# Patient Record
Sex: FEMALE | Race: WHITE | Employment: FULL TIME
[De-identification: names, ages, dates, MRNs, and addresses within clinical notes are randomized per-mention and may not be internally consistent; named-entity substitution may affect disease eponyms.]

---

## 2017-10-08 ENCOUNTER — HEALTH MAINTENANCE LETTER (OUTPATIENT)
Age: 27
End: 2017-10-08

## 2024-02-28 ENCOUNTER — TRANSFERRED RECORDS (OUTPATIENT)
Dept: HEALTH INFORMATION MANAGEMENT | Facility: CLINIC | Age: 34
End: 2024-02-28

## 2024-07-10 SDOH — HEALTH STABILITY: PHYSICAL HEALTH: ON AVERAGE, HOW MANY DAYS PER WEEK DO YOU ENGAGE IN MODERATE TO STRENUOUS EXERCISE (LIKE A BRISK WALK)?: 3 DAYS

## 2024-07-10 SDOH — HEALTH STABILITY: PHYSICAL HEALTH: ON AVERAGE, HOW MANY MINUTES DO YOU ENGAGE IN EXERCISE AT THIS LEVEL?: 30 MIN

## 2024-07-10 ASSESSMENT — SOCIAL DETERMINANTS OF HEALTH (SDOH): HOW OFTEN DO YOU GET TOGETHER WITH FRIENDS OR RELATIVES?: MORE THAN THREE TIMES A WEEK

## 2024-07-15 ENCOUNTER — OFFICE VISIT (OUTPATIENT)
Dept: FAMILY MEDICINE | Facility: CLINIC | Age: 34
End: 2024-07-15
Payer: COMMERCIAL

## 2024-07-15 VITALS
SYSTOLIC BLOOD PRESSURE: 126 MMHG | WEIGHT: 279 LBS | BODY MASS INDEX: 47.63 KG/M2 | OXYGEN SATURATION: 100 % | RESPIRATION RATE: 16 BRPM | HEIGHT: 64 IN | DIASTOLIC BLOOD PRESSURE: 82 MMHG | HEART RATE: 100 BPM | TEMPERATURE: 99 F

## 2024-07-15 DIAGNOSIS — Z33.1 PREGNANCY, INCIDENTAL: ICD-10-CM

## 2024-07-15 DIAGNOSIS — F32.9 MAJOR DEPRESSIVE DISORDER WITH CURRENT ACTIVE EPISODE, UNSPECIFIED DEPRESSION EPISODE SEVERITY, UNSPECIFIED WHETHER RECURRENT: ICD-10-CM

## 2024-07-15 DIAGNOSIS — L93.2 CUTANEOUS LUPUS ERYTHEMATOSUS: ICD-10-CM

## 2024-07-15 DIAGNOSIS — E03.8 SUBCLINICAL HYPOTHYROIDISM: ICD-10-CM

## 2024-07-15 DIAGNOSIS — Z3A.01 6 WEEKS GESTATION OF PREGNANCY: ICD-10-CM

## 2024-07-15 DIAGNOSIS — Z76.89 ENCOUNTER TO ESTABLISH CARE: Primary | ICD-10-CM

## 2024-07-15 LAB
ABO/RH(D): NORMAL
ERYTHROCYTE [DISTWIDTH] IN BLOOD BY AUTOMATED COUNT: 13 % (ref 10–15)
HBA1C MFR BLD: 5.3 % (ref 0–5.6)
HCT VFR BLD AUTO: 37.7 % (ref 35–47)
HGB BLD-MCNC: 12.7 G/DL (ref 11.7–15.7)
MCH RBC QN AUTO: 31 PG (ref 26.5–33)
MCHC RBC AUTO-ENTMCNC: 33.7 G/DL (ref 31.5–36.5)
MCV RBC AUTO: 92 FL (ref 78–100)
PLATELET # BLD AUTO: 255 10E3/UL (ref 150–450)
RBC # BLD AUTO: 4.1 10E6/UL (ref 3.8–5.2)
SPECIMEN EXPIRATION DATE: NORMAL
WBC # BLD AUTO: 6.8 10E3/UL (ref 4–11)

## 2024-07-15 PROCEDURE — 84439 ASSAY OF FREE THYROXINE: CPT | Performed by: PHYSICIAN ASSISTANT

## 2024-07-15 PROCEDURE — 86780 TREPONEMA PALLIDUM: CPT | Performed by: PHYSICIAN ASSISTANT

## 2024-07-15 PROCEDURE — 83036 HEMOGLOBIN GLYCOSYLATED A1C: CPT | Performed by: PHYSICIAN ASSISTANT

## 2024-07-15 PROCEDURE — 86762 RUBELLA ANTIBODY: CPT | Performed by: PHYSICIAN ASSISTANT

## 2024-07-15 PROCEDURE — 86706 HEP B SURFACE ANTIBODY: CPT | Performed by: PHYSICIAN ASSISTANT

## 2024-07-15 PROCEDURE — 36415 COLL VENOUS BLD VENIPUNCTURE: CPT | Performed by: PHYSICIAN ASSISTANT

## 2024-07-15 PROCEDURE — 87389 HIV-1 AG W/HIV-1&-2 AB AG IA: CPT | Performed by: PHYSICIAN ASSISTANT

## 2024-07-15 PROCEDURE — 80053 COMPREHEN METABOLIC PANEL: CPT | Performed by: PHYSICIAN ASSISTANT

## 2024-07-15 PROCEDURE — 86901 BLOOD TYPING SEROLOGIC RH(D): CPT | Performed by: PHYSICIAN ASSISTANT

## 2024-07-15 PROCEDURE — 99204 OFFICE O/P NEW MOD 45 MIN: CPT | Performed by: PHYSICIAN ASSISTANT

## 2024-07-15 PROCEDURE — 86900 BLOOD TYPING SEROLOGIC ABO: CPT | Performed by: PHYSICIAN ASSISTANT

## 2024-07-15 PROCEDURE — 85027 COMPLETE CBC AUTOMATED: CPT | Performed by: PHYSICIAN ASSISTANT

## 2024-07-15 PROCEDURE — 84443 ASSAY THYROID STIM HORMONE: CPT | Performed by: PHYSICIAN ASSISTANT

## 2024-07-15 PROCEDURE — 87086 URINE CULTURE/COLONY COUNT: CPT | Performed by: PHYSICIAN ASSISTANT

## 2024-07-15 RX ORDER — BUPROPION HYDROCHLORIDE 300 MG/1
300 TABLET ORAL EVERY MORNING
Qty: 90 TABLET | Refills: 3 | Status: SHIPPED | OUTPATIENT
Start: 2024-07-15 | End: 2024-10-07

## 2024-07-15 RX ORDER — LEVOTHYROXINE SODIUM 25 UG/1
25 TABLET ORAL DAILY
COMMUNITY
End: 2024-07-16

## 2024-07-15 RX ORDER — ALBUTEROL SULFATE 90 UG/1
2 AEROSOL, METERED RESPIRATORY (INHALATION) EVERY 4 HOURS PRN
COMMUNITY

## 2024-07-15 RX ORDER — BUPROPION HYDROCHLORIDE 300 MG/1
300 TABLET ORAL EVERY MORNING
COMMUNITY
Start: 2016-07-10 | End: 2024-07-15

## 2024-07-15 ASSESSMENT — PAIN SCALES - GENERAL: PAINLEVEL: NO PAIN (1)

## 2024-07-15 NOTE — PATIENT INSTRUCTIONS
Patient Education   Preventive Care Advice   This is general advice given by our system to help you stay healthy. However, your care team may have specific advice just for you. Please talk to your care team about your preventive care needs.  Nutrition  Eat 5 or more servings of fruits and vegetables each day.  Try wheat bread, brown rice and whole grain pasta (instead of white bread, rice, and pasta).  Get enough calcium and vitamin D. Check the label on foods and aim for 100% of the RDA (recommended daily allowance).  Lifestyle  Exercise at least 150 minutes each week  (30 minutes a day, 5 days a week).  Do muscle strengthening activities 2 days a week. These help control your weight and prevent disease.  No smoking.  Wear sunscreen to prevent skin cancer.  Have a dental exam and cleaning every 6 months.  Yearly exams  See your health care team every year to talk about:  Any changes in your health.  Any medicines your care team has prescribed.  Preventive care, family planning, and ways to prevent chronic diseases.  Shots (vaccines)   HPV shots (up to age 26), if you've never had them before.  Hepatitis B shots (up to age 59), if you've never had them before.  COVID-19 shot: Get this shot when it's due.  Flu shot: Get a flu shot every year.  Tetanus shot: Get a tetanus shot every 10 years.  Pneumococcal, hepatitis A, and RSV shots: Ask your care team if you need these based on your risk.  Shingles shot (for age 50 and up)  General health tests  Diabetes screening:  Starting at age 35, Get screened for diabetes at least every 3 years.  If you are younger than age 35, ask your care team if you should be screened for diabetes.  Cholesterol test: At age 39, start having a cholesterol test every 5 years, or more often if advised.  Bone density scan (DEXA): At age 50, ask your care team if you should have this scan for osteoporosis (brittle bones).  Hepatitis C: Get tested at least once in your life.  STIs (sexually  transmitted infections)  Before age 24: Ask your care team if you should be screened for STIs.  After age 24: Get screened for STIs if you're at risk. You are at risk for STIs (including HIV) if:  You are sexually active with more than one person.  You don't use condoms every time.  You or a partner was diagnosed with a sexually transmitted infection.  If you are at risk for HIV, ask about PrEP medicine to prevent HIV.  Get tested for HIV at least once in your life, whether you are at risk for HIV or not.  Cancer screening tests  Cervical cancer screening: If you have a cervix, begin getting regular cervical cancer screening tests starting at age 21.  Breast cancer scan (mammogram): If you've ever had breasts, begin having regular mammograms starting at age 40. This is a scan to check for breast cancer.  Colon cancer screening: It is important to start screening for colon cancer at age 45.  Have a colonoscopy test every 10 years (or more often if you're at risk) Or, ask your provider about stool tests like a FIT test every year or Cologuard test every 3 years.  To learn more about your testing options, visit:   .  For help making a decision, visit:   https://bit.ly/nr03273.  Prostate cancer screening test: If you have a prostate, ask your care team if a prostate cancer screening test (PSA) at age 55 is right for you.  Lung cancer screening: If you are a current or former smoker ages 50 to 80, ask your care team if ongoing lung cancer screenings are right for you.  For informational purposes only. Not to replace the advice of your health care provider. Copyright   2023 [employer]. All rights reserved. Clinically reviewed by the [facility] Transitions Program. [employer] [id number] - REV 01/24.

## 2024-07-15 NOTE — PROGRESS NOTES
"Preventive Care Visit  [facility]  NATASHA FRANCO PA-C, Physician Assistant - Medical  Jul 15, 2024      Assessment & Plan     Encounter to establish care  Records briefly reviewed.     Subclinical hypothyroidism  Will check TSH/T4. If needed, will restart levothyroxine. If not, will defer to OB provider to monitor in pregnancy (which often leads to a hyperthyroid picture).    Cutaneous lupus erythematosus  Not taking any medications.     Major depressive disorder with current active episode, unspecified depression episode severity, unspecified whether recurrent  Benefits outweigh risks for this patient. Has tried multiple medications. Therefore, I have refilled this for her.   - buPROPion (WELLBUTRIN XL) 300 MG 24 hr tablet; Take 1 tablet (300 mg) by mouth every morning    Pregnancy, incidental      6 weeks gestation of pregnancy  Jonah initial pregnancy labs (pt stated she is Rh negative) since she is here and we are drawing her blood for other reasons. Too soon for doptone. Will defer to OB provider to follow up as needed.     - CBC with platelets  - Rubella Antibody IgG  - Urine Culture Aerobic Bacterial  - HIV Antigen Antibody Combo  - Hepatitis B Surface Antibody  - Treponema Abs w Reflex to RPR and Titer  - Comprehensive metabolic panel (BMP + Alb, Alk Phos, ALT, AST, Total. Bili, TP)  - Hemoglobin A1c  - ABO and Rh    BMI  Estimated body mass index is 47.89 kg/m  as calculated from the following:    Height as of this encounter: 1.626 m (5' 4\").    Weight as of this encounter: 126.6 kg (279 lb).       Subjective   Sourav is a 33 year old, presenting for the following:  Establish care.        7/15/2024     2:53 PM   Additional Questions   Roomed by Priti   Accompanied by self         7/15/2024     2:53 PM   Patient Reported Additional Medications   Patient reports taking the following new medications n/a        Sourav is a albino 33 year old female who presents to clinic to establish " a primary care relationship. She is originally from [place] and went to [place] for college. 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 She brought her  back home with her because she wanted to raise her family here. She is roughly 6 weeks pregnant (positive home test 2 weeks ago), LMP was 6/4/24, first pregnancy. No spotting. Last PAP was July 2019 so she is due. However, she has an appt with a Midwife on August 19th and will be doing her PAP, vaginal cultures at that initial OB visit.     She has a hx of needing to take levothyroxine for subclinical hypothyroidism but has not taken it for 3 months because there was an issue with insurance and her move. She was on 25mcg/50mcg every other day regimen. She would like her thyroid checked.     She takes bupropion for depression (since age 12), is doing very well on it and does not wish to discontinue the medication. She states SSRIs are not effective. She has tried multiple medications and this is this one that works. She says her  can tell if she misses even one dose.    She has had chicken pox.              7/10/2024   General Health   How would you rate your overall physical health? Good   Feel stress (tense, anxious, or unable to sleep) To some extent      (!) STRESS CONCERN      7/10/2024   Nutrition   Three or more servings of calcium each day? (!) NO   Diet: Vegetarian/vegan   How many servings of fruit and vegetables per day? (!) 2-3   How many sweetened beverages each day? 0-1            7/10/2024   Exercise   Days per week of moderate/strenous exercise 3 days   Average minutes spent exercising at this level 30 min            7/10/2024   Social Factors   Frequency of gathering with friends or relatives More than three times a week   Worry food won't last until get money to buy more No   Food not last or not have enough money for food? No   Do you have housing? (Housing is defined as stable permanent housing  and does not include staying ouside in a car, in a tent, in an abandoned building, in an overnight shelter, or couch-surfing.) Yes   Are you worried about losing your housing? No   Lack of transportation? No   Unable to get utilities (heat,electricity)? No            7/10/2024   Dental   Dentist two times every year? Yes            7/10/2024   TB Screening   Were you born outside of the [place]? Yes            Today's PHQ-2 Score:       7/15/2024     2:53 PM   PHQ-2 ( 1999 [employer])   Q1: Little interest or pleasure in doing things 1   Q2: Feeling down, depressed or hopeless 1   PHQ-2 Score 2   Q1: Little interest or pleasure in doing things Several days   Q2: Feeling down, depressed or hopeless Several days   PHQ-2 Score 2           7/10/2024   Substance Use   Alcohol more than 3/day or more than 7/wk No   Do you use any other substances recreationally? No        Social History     Tobacco Use     Smoking status: Never     Smokeless tobacco: Never   Vaping Use     Vaping status: Never Used   Substance Use Topics     Alcohol use: No     Drug use: No             7/10/2024   One time HIV Screening   Previous HIV test? No          7/10/2024   STI Screening   New sexual partner(s) since last STI/HIV test? No        History of abnormal Pap smear: No - age 30-64 HPV with reflex Pap every 5 years recommended          7/10/2024   Contraception/Family Planning   Questions about contraception or family planning No         Reviewed and updated as needed this visit by Provider       Past Medical History:   Diagnosis Date     Depressive disorder 2002     Thyroid disease 2022     Past Surgical History:   Procedure Laterality Date     HC REMOVAL OF TONSILS,<11 Y/O      Description: Tonsillectomy;  Recorded: 01/13/2012;     OB History   No obstetric history on file.     BP Readings from Last 3 Encounters:   07/15/24 126/82   10/13/15 128/79   09/22/09 109/61    Wt Readings from Last 3 Encounters:   07/15/24 126.6 kg (279 lb)   10/13/15  "89.8 kg (198 lb)   09/22/09 97.1 kg (214 lb) (98%, Z= 2.13)*     * Growth percentiles are based on CDC (Girls, 2-20 Years) data.                  Patient Active Problem List   Diagnosis     CARDIOVASCULAR SCREENING; LDL GOAL LESS THAN 160     Past Surgical History:   Procedure Laterality Date     HC REMOVAL OF TONSILS,<13 Y/O      Description: Tonsillectomy;  Recorded: 01/13/2012;       Social History     Tobacco Use     Smoking status: Never     Smokeless tobacco: Never   Substance Use Topics     Alcohol use: No     Family History   Problem Relation Age of Onset     Breast Cancer Mother      Diabetes Father      Obesity Father      Breast Cancer Maternal Grandmother      Heart Disease Paternal Grandmother         chf     Depression Brother      Obesity Sister      Cancer Other         cervival     Breast Cancer Other          Current Outpatient Medications   Medication Sig Dispense Refill     albuterol (PROAIR HFA/PROVENTIL HFA/VENTOLIN HFA) 108 (90 Base) MCG/ACT inhaler Inhale 2 puffs into the lungs every 4 hours as needed for cough       buPROPion (WELLBUTRIN XL) 300 MG 24 hr tablet Take 1 tablet (300 mg) by mouth every morning 90 tablet 3     levothyroxine (SYNTHROID) 25 MCG tablet Take 1 tablet (25 mcg) by mouth daily 90 tablet 3     Prenatal Vit-Fe Fumarate-FA (PRENATAL MULTIVITAMIN  PLUS IRON) 27-1 MG TABS Take 1 tablet by mouth daily       Allergies   Allergen Reactions     Nkda [No Known Drug Allergy]      Recent Labs   Lab Test 07/15/24  1603   A1C 5.3   ALT 64*   CR 0.66   GFRESTIMATED >90   POTASSIUM 4.1   TSH 7.36*          Review of Systems  Constitutional, HEENT, cardiovascular, pulmonary, gi and gu systems are negative, except as otherwise noted.       Objective    Exam  /82   Pulse 100   Temp 99  F (37.2  C) (Tympanic)   Resp 16   Ht 1.626 m (5' 4\")   Wt 126.6 kg (279 lb)   SpO2 100%   BMI 47.89 kg/m     Estimated body mass index is 47.89 kg/m  as calculated from the following:    " "Height as of this encounter: 1.626 m (5' 4\").    Weight as of this encounter: 126.6 kg (279 lb).    Physical Exam  Vitals reviewed.   Constitutional:       Appearance: Normal appearance. She is obese. She is not ill-appearing.   Neck:      Thyroid: No thyroid mass, thyromegaly or thyroid tenderness.   Cardiovascular:      Rate and Rhythm: Normal rate and regular rhythm.      Heart sounds: Normal heart sounds. No murmur heard.  Pulmonary:      Effort: Pulmonary effort is normal.      Breath sounds: Normal breath sounds.   Musculoskeletal:      Cervical back: Neck supple.   Lymphadenopathy:      Cervical: No cervical adenopathy.      Upper Body:      Right upper body: No supraclavicular adenopathy.      Left upper body: No supraclavicular adenopathy.   Neurological:      General: No focal deficit present.      Mental Status: She is alert and oriented to person, place, and time.   Psychiatric:         Mood and Affect: Mood normal.         Speech: Speech normal.         Behavior: Behavior normal. Behavior is cooperative.         Thought Content: Thought content normal.         Cognition and Memory: Cognition normal.         Judgment: Judgment normal.       Signed Electronically by: NATASHA FRANCO PA-C    "

## 2024-07-16 DIAGNOSIS — R79.89 LFT ELEVATION: Primary | ICD-10-CM

## 2024-07-16 LAB
ALBUMIN SERPL BCG-MCNC: 4.3 G/DL (ref 3.5–5.2)
ALP SERPL-CCNC: 79 U/L (ref 40–150)
ALT SERPL W P-5'-P-CCNC: 64 U/L (ref 0–50)
ANION GAP SERPL CALCULATED.3IONS-SCNC: 11 MMOL/L (ref 7–15)
AST SERPL W P-5'-P-CCNC: 37 U/L (ref 0–45)
BILIRUB SERPL-MCNC: 0.3 MG/DL
BUN SERPL-MCNC: 9.5 MG/DL (ref 6–20)
CALCIUM SERPL-MCNC: 9.1 MG/DL (ref 8.6–10)
CHLORIDE SERPL-SCNC: 102 MMOL/L (ref 98–107)
CREAT SERPL-MCNC: 0.66 MG/DL (ref 0.51–0.95)
EGFRCR SERPLBLD CKD-EPI 2021: >90 ML/MIN/1.73M2
GLUCOSE SERPL-MCNC: 85 MG/DL (ref 70–99)
HBV SURFACE AB SERPL IA-ACNC: 26.3 M[IU]/ML
HBV SURFACE AB SERPL IA-ACNC: REACTIVE M[IU]/ML
HCO3 SERPL-SCNC: 23 MMOL/L (ref 22–29)
HIV 1+2 AB+HIV1 P24 AG SERPL QL IA: NONREACTIVE
POTASSIUM SERPL-SCNC: 4.1 MMOL/L (ref 3.4–5.3)
PROT SERPL-MCNC: 7 G/DL (ref 6.4–8.3)
RUBV IGG SERPL QL IA: 8.02 INDEX
RUBV IGG SERPL QL IA: POSITIVE
SODIUM SERPL-SCNC: 136 MMOL/L (ref 135–145)
T PALLIDUM AB SER QL: NONREACTIVE
T4 FREE SERPL-MCNC: 1.07 NG/DL (ref 0.9–1.7)
TSH SERPL DL<=0.005 MIU/L-ACNC: 7.36 UIU/ML (ref 0.3–4.2)

## 2024-07-16 RX ORDER — LEVOTHYROXINE SODIUM 25 UG/1
25 TABLET ORAL DAILY
Qty: 90 TABLET | Refills: 3 | Status: SHIPPED | OUTPATIENT
Start: 2024-07-16 | End: 2024-10-07

## 2024-07-17 PROBLEM — Z3A.01 6 WEEKS GESTATION OF PREGNANCY: Status: ACTIVE | Noted: 2024-07-17

## 2024-07-17 PROBLEM — F32.9 MAJOR DEPRESSIVE DISORDER WITH CURRENT ACTIVE EPISODE, UNSPECIFIED DEPRESSION EPISODE SEVERITY, UNSPECIFIED WHETHER RECURRENT: Status: ACTIVE | Noted: 2024-07-17

## 2024-07-17 PROBLEM — L93.2 CUTANEOUS LUPUS ERYTHEMATOSUS: Status: ACTIVE | Noted: 2024-07-17

## 2024-07-17 LAB — BACTERIA UR CULT: NORMAL

## 2024-07-19 ENCOUNTER — HOSPITAL ENCOUNTER (OUTPATIENT)
Dept: ULTRASOUND IMAGING | Facility: CLINIC | Age: 34
Discharge: HOME | End: 2024-07-19
Attending: PHYSICIAN ASSISTANT | Admitting: PHYSICIAN ASSISTANT
Payer: COMMERCIAL

## 2024-07-19 DIAGNOSIS — R79.89 LFT ELEVATION: ICD-10-CM

## 2024-07-19 PROCEDURE — 76705 ECHO EXAM OF ABDOMEN: CPT | Mod: 26 | Performed by: RADIOLOGY

## 2024-07-19 PROCEDURE — 76705 ECHO EXAM OF ABDOMEN: CPT

## 2024-08-17 NOTE — PATIENT INSTRUCTIONS
"\"We hope you had a positive experience and that you can definitely recommend [facility] to your family and friends. You ll be receiving a survey soon and we look forward to hearing your feedback\".    Welcome to [facility]   and thank you for choosing us for your maternity care provider!  Congratulations!      BeMyGuesthart  After each of your visits you are welcome to check Globevestor for your visit summary including education and links to information relevant to your pregnancy and/or well woman care.   Find the \"Visits\" tab at the top of the page, you will see a list of recent visits and for each visit a for link for \"View After Visit Summary.\" View of your After Visit Summary will allow you to read our recommendations from your visit, review any education provided, and link to websites with useful information.   If you have any questions or difficulty navigating [employer], please feel free to contact us and we will do our best to direct you.  Meet the Midwives from [facility]  You are invited to an informational meet and greet with [facility]'s [facility] Certified Nurse-Midwives. Our free \"Meet the Midwives\" event is a great opportunity to learn about our midwives' philosophy and experience, the hospitals where we can assist with your birth, and answer questions you may have. Partners, friends, and family are welcome to attend. Currently, this is a virtual event.  Date  Last Thursday of every month at 7 pm.    Link to next (live) meeting   [email or web address]  To Join by Telephone (audio only) Call:   [phone] Phone Conference ID: [phone] #    Contact information:  Appointment line and to get a hold of CNM in clinic Monday-Friday 8 am - 5 pm:  [phone].  There are some clinics with early start times (1st appointment 7:40 am) and others with evening hours (last appointment 6:20 pm).  Most are typically open from 8 " am to 5 pm.    CNM on call answering service: [phone].  Specify your hospital of choice and leave a brief message for CNM;  will then page CNM who is on call at your specified hospital and you should receive a call back with 15 minutes.  Be sure that your ringer is audible and that you can accept blocked calls so that we can get back in touch with you! This number should be reserved for urgent needs if during the day, before 8 am, after 5 pm, weekends, holidays.      We support all families in their infant feeding journey. We'll provide education on Breastfeeding/Chestfeeding early and often to help you achieve your goals. Please let us know if you have questions along the way!      Breastfeeding: a Healthy Option for You and Your Baby  Consider breastfeeding for the healthiest way to feed your baby. Ask your midwife or physician for more information.     The choice of how you will feed your baby is important.  Before your baby s birth, you ll want to learn about the benefits of breastfeeding.  [facility]) continue to support Baby Friendly standards; an initiative that was created by the World Health Organization and UNICEF.  This helps give you and your baby the best start in feeding their baby.    Why should I breastfeed my baby?   Babies are less likely to develop childhood obesity or diabetes   Babies are less likely to suffer from recurrent ear infections   Babies are less likely to be hospitalized for respiratory conditions   Breast milk is rich in nutrients and antibodies-it is easy to digest    How does it benefit me?   Lowers the risk for diabetes, breast and ovarian cancer and postpartum depression   Moms can lose  baby weight  more quickly   Cost savings - formula can cost well over $1,500 per year   Convenient - no bottles and nipples to sterilize, no measuring and mixing formula   The physical contact with  breastfeeding can make babies feel secure, warm and comforted     What about formula?  While you and your baby are staying with us at [facility], we will support whatever feeding choice you make for your baby.    Some important considerations:    The American Academy of Pediatrics, the World Health Organization, and many more organizations recommend exclusive breastfeeding for 6 months and continued breastfeeding while adding other foods for the first 1-2 years.    Any amount of breastmilk has benefits to both baby and mother.  Giving formula in replacement of breastfeeding can affect mother s milk supply.  If formula is needed, hospital staff will work with you on a plan to help develop your milk supply.  Formula alters the natural growth of good bacteria in the  stomach.   Research has found that first time mothers who offer formula in the hospital have a shorter duration of breastfeeding.    How can I start to prepare?   Start by having a conversation with your medical provider.   Talk with your partner, family and friends.   Attend a prenatal class that includes breastfeeding preparation. Birth and breastfeeding classes are offered by beModel. Visit Netvibes for class information.   After your baby s birth, hospital staff and lactation consultants will help you and your baby get off to a great start with breastfeeding.      RESOURCES  [facility] Maternity Care:   https://[facility].org/locations/the-birth[facility] Maternity Care:   https://[facility].org/locations/the-[facility]place-at[facility]        Breastfeeding:    OUTPATIENT LACTATION RESOURCES     -Schedule an appointment with a [facility] Nurse Midwives [facility] RACHEL who is also a Lactation Consultant by calling [phone]. We see women for breastfeeding visits at [facility].      Chocolate Milk Club:  http://www.StreamSpecvesselsmidwiferysThird Brigadeices.com/chocolate-milk-club/  Join the Facebook group or join us for support on the first Monday of each month from 7 to 9 p.m.  , Dr. Michael Guardado, DNP, CNM, CNP, IBCLC, ICEA  Phone: [phone]  Fax: [phone]  Email: [email or web address]    R.O.S.E. = Reaching our Sisters Everywhere  Http://www.breastfeedingrose.org/    Black Women Do Breastfeed Blog  www.blackwomendobreastfeed.org    Club Mom breastfeeding support for Black mothers:  Contact Diana Mak  Phone: [phone]   Email:  [email or web address]     Kay Suarez  Phone: [phone]   Email:  [email or web address].    Club Dad parent support for Black fathers:   Contact Hamzah López   Phone: [phone]   Email:  [email or web address].    The ong Breastfeeding Coalition is a wonderful support for [facility] women who are breastfeeding.  They are best found on Facebook.      The Hmong Breastfeeding Coalition has produced a collection of video stories about breastfeeding in the ong community, produced by the Hmong Breastfeeding Coalition.  Most are in English, but one on handling breastmilk is in Hmong.  The video collection is in the middle of the page.    This page also has several other resources on ong breastfeeding.     https://mnbreastfeedingcoalition.org/communities/    WIC program application: Paddy Tavo   Https://www.youWolongeube.com/watch?v=lQoWwPoyGYc    For information on Local WIC services call:  [phone]    You may qualify...  If you are pregnant, nursing, or have a child under age 5, we encourage you to Apply for WIC.    WIC Provides...  Nutrition tips and advice  Support for breastfeeding  Healthy foods like fresh fruits and vegetables, whole grain cereals, bread and tortillas, low fat milk, and baby foods  Caring and supportive staff  Don't delay! We're here to help!  CALL TODAY FOR A WIC  CLINIC NEAR YOU  0-535-ODM-0358 or 1-815.348.1482     New Parent Connection:   Elidia Hernadez, [address] [facility]  In-person meetings on  from 6 pm - 7:15 pm for parents of  to 9 months, at the same site.   All are free, drop-in, no registration required.    There are also free virtual meetings ongoing on :  11:30 am - 12:30 pm for parents of newborns to 3 months  4:15 pm to 5:15 pm for parents of 3 to 9-month olds  For joining info parents should call Nereida Calderon at [phone]    -Baby Café  Find a Baby Café - Baby Café Zappli (babyCreation Technologies.Real Gravity)   Search by State ([place]) to find the nearest cafe to you      [facility] Baby Café  Due to COVID-19, all Baby Café sessions are canceled until further notice. For lactation support, please contact one of our bilingual staff:  Jia (IBCLC) [phone]  Brittany (IBCLC/ Hebrew) [phone]  Zobrayden (Hmong) [phone]  Maynor (Prydeinig) [phone]  Baby Café is a free, drop-in service offering breastfeeding/chestfeeding support. Come share tips and socialize with other pregnant, breastfeeding/chestfeeding families. Babies and siblings are welcome (no  available).  We offer:  Professionally trained lactation staff.  Resource books for lending.  Relaxed and fun atmosphere.  Refreshments.   More information  Brittany Kidd  [phone]  [email or web address].     -Attend a baby weigh in at [facility].  Lactation consultants are available to answer questions  [place]:  1:00 - 2:00  [facility]:  1:00 - 2:00   www.Molecular PartnersWest Los Angeles Memorial HospitalGenable Technologies Ltd..HelloSign    -Attend one of the New Mama groups at [facility] in [facility].  [facility] also offers one-on-one in home and in office lactation consults.   www.Red[employer] Power [facility].HelloSign    -Attend a LeLeche League meeting.  Multiple groups in several locations throughout the [facility]. The meetings are no-cost and always informative breastfeeding education session  through Internnathaly La Leche Lorna  Www.ada.org/     Held at [facility] the second Thursday of each month at 7pm    Childbirth and Parenting Education:     Everyday Miracles:   https://www.everyday-miracles.org/    Free Video Series from [facility]: https://nursing.[facility]/academics/specialty-areas/nurse-midwifery/having-baby-prenatal-videos/having-baby-prenatal-and    Childbirth Education virtual (live) classes: www.Beamr/classes  The Birth Hour: https://Fixmo/online-childbirth-class/  BirthED: https://www.birthedmn.com/  JOSSY parenting center: http://Capture Educational Consulting ServicesIra Davenport Memorial HospitalOptixConnect/   (600) 835-DWQI  Blooma: (education, yoga & wellness) www.Nitro PDF  Enlightened Mama: www.enlightenedmamaOrbeus   Childbirth collective: (Parent topic nights)  www.childbirthcollective.org/  Hypnobabies:  www.hypnobabiestFinicity.Off Track Planet/  Hypnobirthing:  Http://Agrar33.Off Track Planet/  Hypnobirthing virtual class: www.Shopography/hypnobirthing    Information about doulas:  Childbirth collective: http://www.childbirthcollective.org/  Doulas of [place] (DONNIE):  www.donnie.org  [facility]  project: http://twincitiesdoulaproject.com/     Early Childhood and Family Education (ECFE):  ECFE offers parents hands-on learning experiences that will nourish a lifetime of teachable moments.  http://ecfe.info/ecfe-home/    APPS and Podcasts:   Gricelda Rubin Nurture    Evidence Based Birth  The Birth Hour (for birth stories)   Birthful   Expectful   The Longest Shortest Time  PregnancyPodcast Jessica Rojas  https://www.downtobirthshow.com/    Book Recommendations:   Caroline Oslo's Birthing From Within--first few chapters include a new-age tone, you may prefer to skip it and keep going, because there is good stuff later.  This book recommendation covers emotional preparation, but does cover coping with pain, and use of both pharmacological and nonpharmacological methods.    Guide to Childbirth by  "Rocky Comfort May Jyothi  Childbirth Without Fear by Rob Cody Read    Dr. Sanchez' The Pregnancy Book and The Birth Book--the pregnancy book goes month-by month    The Birth Partner by Anisa Malik    Womanly Art of Breastfeeding by La Leche League International   Bestfeeding by Laney Israel--great pictures    Mothering Your Nursing Toddler, by Faviola Riggs.   Addresses dealing with so many of the challenging behaviors of a nursing toddler.  How Weaning Happens, by La Leche League.  Discusses weaning at all ages, from medically necessary weaning of an infant, all the way up to age 5 (or older), with why/why not, and strategies.  Very empowering book both for deciding to wean and deciding not to.    American College of Nurse-Midwives (ACNM) http://www.midwife.org/; look at the informational handouts at http://www.midwife.org/Share-With-Women     www.mymidwife.org    Mother to Baby (Medication and Herbal guidance in pregnancy): http://www.mothertobaby.org  Toll-Free Hotline: 457.698.7991  LactMed (Medication use while breastfeeding): http://toxnet.nlm.nih.gov/newtoxnet/lactmed.htm    Women's Health.gov:  http://www.womenshealth.gov/a-z-topics/index.html    American pregnancy association - http://americanpregnancy.org    Centering Pregnancy (group prenatal care option): http://centeringhealthcare.org    March of Dimes www.MoboTap.com     FDA - Nutrition  www.mypyramid.gov  Under \"For Consumers,\" click on \"pregnant and breastfeeding women.\"      Centers for Disease Control and Prevention (CDC) - Vaccines : http://www.cdc.gov/vaccines/       When researching information on the web, question the validity of websites.  The domains .gov, .edu and.org tend to be more reliable information.  If there are a lot of advertisements, be cautious of the information provided. Stay away from blogs and chat rooms please!    "

## 2024-08-17 NOTE — PROGRESS NOTES
"PRENATAL VISIT   FIRST OBSTETRICAL EXAM - OB     Assessment / Impression   First prenatal visit at 10w6d  34 year old    Pre-pregnant BMI 48  Blood type A negative   Hepatic steatosis (borderline fatty liver)  Depression, stable on Wellbutrin, EDPS = 8, \"0\" to #10  Cutaneous lupus  Thyroid disorder, on thyroid replacement    Plan:   -Reviewed negative blood type and recommendation to call with any vaginal bleeding.  Aware of recommendation for RhoGAM in setting of vaginal bleeding in pregnancy, at 28 weeks, and again postpartum as indicated.  -[facility] referral for Level II ultrasound at 18 wks as well as consultation regarding borderline fatty liver disease and cutaneous lupus in pregnancy.  Patient aware someone will call her to schedule.  -Reviewed IOB labs previously drawn.  Repeat CMP and CBC and obtain baseline urine PCR today.   -Pt is a candidate for drawing lead level per [facility] screening tool.   -Reviewed prenatal care schedule.   -Optimal nutrition and weight gain discussed. Pregnancy weight gain of no weight gain (BMI 40 or greater) encouraged.   -Anticipatory guidance for common pregnancy questions and concerns reviewed.   -Danger s/sx for this trimester reviewed with patient.   -Reviewed genetic screening options with patient.  At this time, she is not sure she will do any genetic screening.  She has taken the literature and is aware of her options.  Will discuss at future visits if desired.  -Reviewed carrier testing options with patient, patient does not elect for testing or referral to genetic counseling.  -IOB packet given and reviewed with patient.   -[facility] services and hospital options reviewed; emergency and scheduling phone numbers given to patient.   -Because the patient does have 1 high risk factor, low-dose aspirin will be initiated at 12 weeks.  -Antepartum VTE risk factors present- \"At high risk for VTE\" added to problem list.  -Pt is not a candidate for an antepartum OB consult.    -Return OB " visit in 4 weeks or sooner as needed.      NV: Recheck thyroid labs in 4 weeks.    Total time: 75 minutes spent on the date of the encounter doing chart review, review of test results, patient visit and documentation.     Subjective:   Sourav Mascorro is a 34 year old  here today for her first obstetrical exam at 10w6d. Here alone . This pregnancy is planned. Attempting pregnancy since last . The patient reports nausea, vomiting, and fatigue. Patient's last menstrual period was 2024 (exact date). Based on LMP, estimated Date of Delivery: Mar 11, 2025. Last period was normal. Her previous three cycles were q28 days. Her pregnancy is dated by LMP, but she accepts referral for dating ultrasound to confirm ROLY.     The patient states that she is in a monogamous relationship and states that she is safe. Offered GC/CT screening today and patient declines.     Risk factors: pre-pregnancy obesity. Pregnancy Risk Factors:Significantly overweight or underweight, Every been treated for an emotional disturbance, and Felt sad or down for more than 2 weeks in past year    The patient has the following high risk factors for preeclampsia: none.   The patient has the following moderate risk factors for preeclampsia: first pregnancy and BMI greater than 30    The patient has the following antepartum risk factors for VTE (two or more risk factors, or 1 * risk factor, places patient at higher risk): *BMI greater or equal to 40, current.    Clinical history/risk factors requiring antepartum OB consult: none.    She has a thyroid disorder.  This is managed by her primary care provider.  Will plan thyroid labs at next visit and will message her provider who will help adjust medications as needed.    Social History:   Education level: Masters degree  Occupation: Masters of social work  Partners name: David Elisa,  with [employer], plans to be a full-time father after the birth  ?   OB History    Para  Term  AB Living   1 0 0 0 0 0   SAB IAB Ectopic Multiple Live Births   0 0 0 0 0      # Outcome Date GA Lbr Zhou/2nd Weight Sex Type Anes PTL Lv   1 Current               History:   Past Medical History:   Diagnosis Date    Depressive disorder     Since age 12, on Wellbutrin since .  No therapist at this time.  No mental health hospitalizations in her past.    Eating disorder     Reactive airway disease 2023    Exercise-induced, uses albuterol as needed.    Thyroid disease       Past Surgical History:   Procedure Laterality Date    HC REMOVAL OF TONSILS,<11 Y/O      Description: Tonsillectomy;  Recorded: 2012;      Family History   Problem Relation Age of Onset    Breast Cancer Mother     Alcoholism Mother     Diabetes Father     Obesity Father     Alcoholism Father     Breast Cancer Maternal Grandmother     Heart Disease Paternal Grandmother         chf    Depression Brother     Alcoholism Brother     Obesity Sister     Asthma Sister     Cancer Other         cervival    Breast Cancer Other       Social History     Tobacco Use    Smoking status: Never     Passive exposure: Never    Smokeless tobacco: Never   Vaping Use    Vaping status: Never Used   Substance Use Topics    Alcohol use: No    Drug use: No      Current Outpatient Medications   Medication Sig Dispense Refill    albuterol (PROAIR HFA/PROVENTIL HFA/VENTOLIN HFA) 108 (90 Base) MCG/ACT inhaler Inhale 2 puffs into the lungs every 4 hours as needed for cough      aspirin 81 MG EC tablet Take 1 tablet (81 mg) by mouth daily for 120 days 30 tablet 3    buPROPion (WELLBUTRIN XL) 300 MG 24 hr tablet Take 1 tablet (300 mg) by mouth every morning 90 tablet 3    levothyroxine (SYNTHROID) 25 MCG tablet Take 1 tablet (25 mcg) by mouth daily 90 tablet 3    Prenatal Vit-Fe Fumarate-FA (PRENATAL MULTIVITAMIN  PLUS IRON) 27-1 MG TABS Take 1 tablet by mouth daily        Allergies   Allergen Reactions    Nkda [No Known Drug Allergy]       The  "patient's medical, surgical and family histories were reviewed and were pertinent to this visit.     Pap smear history:   Hx of abnormal pap with colp x 2 (Noted 04/26/2017 [facility] OV)  04/07/2017 NIL/HPV negative  07/21/2020 NIL/HPV negative   Plan: Pap/HPV due 07/2025    EPDS score today: 8 .\"  0\" to #10.  Long history of depression, stable on Wellbutrin.    Review of Systems   General: Fatigue but otherwise denies problem   Eyes: Denies problem   Ears/Nose/Throat: Denies problem   Cardiovascular: Denies problem   Respiratory: Denies problem   Gastrointestinal: Nausea without vomiting, otherwise negative   Genitourinary: Denies any discharge, vaginal bleeding or itchiness or any other problem   Musculoskeletal: Denies problem   Skin: Denies problem   Neurologic: Denies problem   Psychiatric: Denies problem   Endocrine: Denies problem   Heme/Lymphatic: Denies problem   Allergic/Immunologic: Denies problem     Objective:   Objective    Vitals:    08/19/24 1559   BP: 126/70   Pulse: 84   SpO2: 97%   Weight: 127 kg (280 lb)   Height: 1.626 m (5' 4\")      Physical Exam:   General Appearance: Alert, cooperative, no distress, appears stated age   HEENT: Normocephalic, without obvious abnormality, atraumatic. Conjunctiva/corneas clear.  Neck: Supple, symmetrical, trachea midline, no adenopathy.   Thyroid: not enlarged, symmetric, no tenderness/mass/nodules   Back: Symmetric, no curvature, ROM normal, no CVA tenderness   Lungs: Clear to auscultation bilaterally, respirations unlabored   Heart: Regular rate and rhythm, S1 and S2 normal, no murmur, rub, or gallop. No edema to lower extremities.   Breasts: No breast masses, tenderness.  Marked asymmetry noted R<L. No nipple discharge.    Abdomen:  soft, non-tender    FHT: 165   Neurologic: Alert and oriented x 3. Normal speech                "

## 2024-08-18 ASSESSMENT — EDINBURGH POSTNATAL DEPRESSION SCALE (EPDS)
I HAVE LOOKED FORWARD WITH ENJOYMENT TO THINGS: AS MUCH AS I EVER DID
I HAVE FELT SCARED OR PANICKY FOR NO GOOD REASON: NO, NOT MUCH
THINGS HAVE BEEN GETTING ON TOP OF ME: YES, SOMETIMES I HAVEN'T BEEN COPING AS WELL AS USUAL
I HAVE BEEN SO UNHAPPY THAT I HAVE BEEN CRYING: ONLY OCCASIONALLY
I HAVE BEEN SO UNHAPPY THAT I HAVE HAD DIFFICULTY SLEEPING: NOT VERY OFTEN
I HAVE FELT SAD OR MISERABLE: NOT VERY OFTEN
THE THOUGHT OF HARMING MYSELF HAS OCCURRED TO ME: NEVER
I HAVE BEEN ABLE TO LAUGH AND SEE THE FUNNY SIDE OF THINGS: AS MUCH AS I ALWAYS COULD
I HAVE FELT SCARED OR PANICKY FOR NO GOOD REASON: NO, NOT MUCH
TOTAL SCORE: 8
I HAVE FELT SAD OR MISERABLE: NOT VERY OFTEN
I HAVE BEEN SO UNHAPPY THAT I HAVE HAD DIFFICULTY SLEEPING: NOT VERY OFTEN
THE THOUGHT OF HARMING MYSELF HAS OCCURRED TO ME: NEVER
I HAVE LOOKED FORWARD WITH ENJOYMENT TO THINGS: AS MUCH AS I EVER DID
THINGS HAVE BEEN GETTING ON TOP OF ME: YES, SOMETIMES I HAVEN'T BEEN COPING AS WELL AS USUAL
I HAVE BLAMED MYSELF UNNECESSARILY WHEN THINGS WENT WRONG: NOT VERY OFTEN
I HAVE BEEN SO UNHAPPY THAT I HAVE BEEN CRYING: ONLY OCCASIONALLY
I HAVE BEEN ANXIOUS OR WORRIED FOR NO GOOD REASON: HARDLY EVER
I HAVE BEEN ANXIOUS OR WORRIED FOR NO GOOD REASON: HARDLY EVER
I HAVE BLAMED MYSELF UNNECESSARILY WHEN THINGS WENT WRONG: NOT VERY OFTEN
I HAVE BEEN ABLE TO LAUGH AND SEE THE FUNNY SIDE OF THINGS: AS MUCH AS I ALWAYS COULD

## 2024-08-19 ENCOUNTER — PRENATAL OFFICE VISIT (OUTPATIENT)
Dept: MIDWIFE SERVICES | Facility: CLINIC | Age: 34
End: 2024-08-19
Payer: COMMERCIAL

## 2024-08-19 VITALS
BODY MASS INDEX: 47.8 KG/M2 | HEIGHT: 64 IN | WEIGHT: 280 LBS | HEART RATE: 84 BPM | SYSTOLIC BLOOD PRESSURE: 126 MMHG | DIASTOLIC BLOOD PRESSURE: 70 MMHG | OXYGEN SATURATION: 97 %

## 2024-08-19 DIAGNOSIS — E06.3 AUTOIMMUNE HYPOTHYROIDISM: ICD-10-CM

## 2024-08-19 DIAGNOSIS — R87.619 ABNORMAL CERVICAL PAPANICOLAOU SMEAR, UNSPECIFIED ABNORMAL PAP FINDING: ICD-10-CM

## 2024-08-19 DIAGNOSIS — Z91.89 AT HIGH RISK FOR VENOUS THROMBOEMBOLISM (VTE): ICD-10-CM

## 2024-08-19 DIAGNOSIS — L93.2 CUTANEOUS LUPUS ERYTHEMATOSUS: ICD-10-CM

## 2024-08-19 DIAGNOSIS — N64.89 BREAST ASYMMETRY: ICD-10-CM

## 2024-08-19 DIAGNOSIS — Z91.89 AT RISK FOR COMPLICATION OF PREGNANCY: ICD-10-CM

## 2024-08-19 DIAGNOSIS — Z80.3 FAMILY HISTORY OF BREAST CANCER: ICD-10-CM

## 2024-08-19 DIAGNOSIS — F32.9 MAJOR DEPRESSIVE DISORDER WITH CURRENT ACTIVE EPISODE, UNSPECIFIED DEPRESSION EPISODE SEVERITY, UNSPECIFIED WHETHER RECURRENT: ICD-10-CM

## 2024-08-19 DIAGNOSIS — O09.90 SUPERVISION OF HIGH RISK PREGNANCY, ANTEPARTUM: Primary | ICD-10-CM

## 2024-08-19 DIAGNOSIS — R74.8 ELEVATED LIVER ENZYMES: ICD-10-CM

## 2024-08-19 DIAGNOSIS — J45.990 EXERCISE-INDUCED ASTHMA: ICD-10-CM

## 2024-08-19 PROBLEM — J45.909 REACTIVE AIRWAY DISEASE: Status: ACTIVE | Noted: 2023-01-30

## 2024-08-19 PROBLEM — R53.83 FATIGUE: Status: RESOLVED | Noted: 2022-11-01 | Resolved: 2024-08-19

## 2024-08-19 PROBLEM — Z86.59 HISTORY OF EATING DISORDER: Status: ACTIVE | Noted: 2021-07-27

## 2024-08-19 PROBLEM — E78.00 HYPERCHOLESTEROLEMIA: Status: RESOLVED | Noted: 2022-11-08 | Resolved: 2024-08-19

## 2024-08-19 PROBLEM — F33.1 MODERATE EPISODE OF RECURRENT MAJOR DEPRESSIVE DISORDER (H): Status: RESOLVED | Noted: 2021-07-27 | Resolved: 2024-08-19

## 2024-08-19 PROBLEM — E78.00 HYPERCHOLESTEROLEMIA: Status: ACTIVE | Noted: 2022-11-08

## 2024-08-19 PROBLEM — R53.83 FATIGUE: Status: ACTIVE | Noted: 2022-11-01

## 2024-08-19 PROBLEM — Z3A.01 6 WEEKS GESTATION OF PREGNANCY: Status: RESOLVED | Noted: 2024-07-17 | Resolved: 2024-08-19

## 2024-08-19 PROBLEM — F33.1 MODERATE EPISODE OF RECURRENT MAJOR DEPRESSIVE DISORDER (H): Status: ACTIVE | Noted: 2021-07-27

## 2024-08-19 PROBLEM — G47.00 INSOMNIA: Status: ACTIVE | Noted: 2024-02-27

## 2024-08-19 PROBLEM — L98.9 INFLAMMATORY DERMATOSIS: Status: ACTIVE | Noted: 2022-11-01

## 2024-08-19 LAB
ALBUMIN MFR UR ELPH: 12.6 MG/DL
ALBUMIN SERPL BCG-MCNC: 4 G/DL (ref 3.5–5.2)
ALP SERPL-CCNC: 90 U/L (ref 40–150)
ALT SERPL W P-5'-P-CCNC: 46 U/L (ref 0–50)
ANION GAP SERPL CALCULATED.3IONS-SCNC: 11 MMOL/L (ref 7–15)
AST SERPL W P-5'-P-CCNC: 32 U/L (ref 0–45)
BILIRUB SERPL-MCNC: 0.2 MG/DL
BUN SERPL-MCNC: 11.2 MG/DL (ref 6–20)
CALCIUM SERPL-MCNC: 9.1 MG/DL (ref 8.8–10.4)
CHLORIDE SERPL-SCNC: 103 MMOL/L (ref 98–107)
CREAT SERPL-MCNC: 0.62 MG/DL (ref 0.51–0.95)
CREAT UR-MCNC: 132.7 MG/DL
EGFRCR SERPLBLD CKD-EPI 2021: >90 ML/MIN/1.73M2
ERYTHROCYTE [DISTWIDTH] IN BLOOD BY AUTOMATED COUNT: 12.5 % (ref 10–15)
GLUCOSE SERPL-MCNC: 91 MG/DL (ref 70–99)
HCO3 SERPL-SCNC: 21 MMOL/L (ref 22–29)
HCT VFR BLD AUTO: 35.5 % (ref 35–47)
HGB BLD-MCNC: 12 G/DL (ref 11.7–15.7)
MCH RBC QN AUTO: 31.3 PG (ref 26.5–33)
MCHC RBC AUTO-ENTMCNC: 33.8 G/DL (ref 31.5–36.5)
MCV RBC AUTO: 92 FL (ref 78–100)
PLATELET # BLD AUTO: 259 10E3/UL (ref 150–450)
POTASSIUM SERPL-SCNC: 4 MMOL/L (ref 3.4–5.3)
PROT SERPL-MCNC: 6.8 G/DL (ref 6.4–8.3)
PROT/CREAT 24H UR: 0.09 MG/MG CR (ref 0–0.2)
RBC # BLD AUTO: 3.84 10E6/UL (ref 3.8–5.2)
SODIUM SERPL-SCNC: 135 MMOL/L (ref 135–145)
WBC # BLD AUTO: 8.7 10E3/UL (ref 4–11)

## 2024-08-19 PROCEDURE — 80053 COMPREHEN METABOLIC PANEL: CPT | Performed by: ADVANCED PRACTICE MIDWIFE

## 2024-08-19 PROCEDURE — 36415 COLL VENOUS BLD VENIPUNCTURE: CPT | Performed by: ADVANCED PRACTICE MIDWIFE

## 2024-08-19 PROCEDURE — 99205 OFFICE O/P NEW HI 60 MIN: CPT | Performed by: ADVANCED PRACTICE MIDWIFE

## 2024-08-19 PROCEDURE — 85027 COMPLETE CBC AUTOMATED: CPT | Performed by: ADVANCED PRACTICE MIDWIFE

## 2024-08-19 PROCEDURE — 83655 ASSAY OF LEAD: CPT | Mod: 90 | Performed by: ADVANCED PRACTICE MIDWIFE

## 2024-08-19 PROCEDURE — 99000 SPECIMEN HANDLING OFFICE-LAB: CPT | Performed by: ADVANCED PRACTICE MIDWIFE

## 2024-08-19 PROCEDURE — 84156 ASSAY OF PROTEIN URINE: CPT | Performed by: ADVANCED PRACTICE MIDWIFE

## 2024-08-19 PROCEDURE — 99417 PROLNG OP E/M EACH 15 MIN: CPT | Performed by: ADVANCED PRACTICE MIDWIFE

## 2024-08-19 RX ORDER — ASPIRIN 81 MG/1
81 TABLET ORAL DAILY
Qty: 30 TABLET | Refills: 3 | Status: SHIPPED | OUTPATIENT
Start: 2024-08-19 | End: 2024-12-17

## 2024-08-20 ENCOUNTER — HOSPITAL ENCOUNTER (OUTPATIENT)
Dept: ULTRASOUND IMAGING | Facility: HOSPITAL | Age: 34
Discharge: HOME | End: 2024-08-20
Attending: ADVANCED PRACTICE MIDWIFE | Admitting: ADVANCED PRACTICE MIDWIFE
Payer: COMMERCIAL

## 2024-08-20 ENCOUNTER — ANCILLARY ORDERS (OUTPATIENT)
Dept: MIDWIFE SERVICES | Facility: CLINIC | Age: 34
End: 2024-08-20

## 2024-08-20 ENCOUNTER — PATIENT OUTREACH (OUTPATIENT)
Dept: ONCOLOGY | Facility: CLINIC | Age: 34
End: 2024-08-20
Payer: COMMERCIAL

## 2024-08-20 DIAGNOSIS — O09.90 SUPERVISION OF HIGH RISK PREGNANCY, ANTEPARTUM: Primary | ICD-10-CM

## 2024-08-20 DIAGNOSIS — F32.9 MAJOR DEPRESSIVE DISORDER WITH CURRENT ACTIVE EPISODE, UNSPECIFIED DEPRESSION EPISODE SEVERITY, UNSPECIFIED WHETHER RECURRENT: ICD-10-CM

## 2024-08-20 DIAGNOSIS — Z80.3 FAMILY HISTORY OF BREAST CANCER: ICD-10-CM

## 2024-08-20 DIAGNOSIS — Z91.89 AT HIGH RISK FOR VENOUS THROMBOEMBOLISM (VTE): ICD-10-CM

## 2024-08-20 DIAGNOSIS — R74.8 ELEVATED LIVER ENZYMES: ICD-10-CM

## 2024-08-20 DIAGNOSIS — L93.2 CUTANEOUS LUPUS ERYTHEMATOSUS: ICD-10-CM

## 2024-08-20 DIAGNOSIS — N64.89 BREAST ASYMMETRY: ICD-10-CM

## 2024-08-20 DIAGNOSIS — Z91.89 AT RISK FOR COMPLICATION OF PREGNANCY: ICD-10-CM

## 2024-08-20 DIAGNOSIS — J45.990 EXERCISE-INDUCED ASTHMA: ICD-10-CM

## 2024-08-20 DIAGNOSIS — O09.90 SUPERVISION OF HIGH RISK PREGNANCY, ANTEPARTUM: ICD-10-CM

## 2024-08-20 DIAGNOSIS — E06.3 AUTOIMMUNE HYPOTHYROIDISM: ICD-10-CM

## 2024-08-20 DIAGNOSIS — R87.619 ABNORMAL CERVICAL PAPANICOLAOU SMEAR, UNSPECIFIED ABNORMAL PAP FINDING: ICD-10-CM

## 2024-08-20 PROCEDURE — 76801 OB US < 14 WKS SINGLE FETUS: CPT

## 2024-08-20 NOTE — PROGRESS NOTES
Writer received referral to Cancer Risk Management/Genetic Counseling.    Referred for:   Dignificant family history of breast cancer, specifically in her mother at age 37.  Please provide consultation regarding screening recommendations.     Referred By    Provider Department Location Phone   Rose Agarwal CNM [facility] [phone]       Referral reviewed for appropriate plan, and sent to New Patient Scheduling ([phone]) for completion.    Connie Jovel, RN, BSN  Oncology New Patient Nurse Navigator   [facility]

## 2024-08-21 ENCOUNTER — TELEPHONE (OUTPATIENT)
Dept: MATERNAL FETAL MEDICINE | Facility: CLINIC | Age: 34
End: 2024-08-21
Payer: COMMERCIAL

## 2024-08-21 LAB — LEAD BLDV-MCNC: <2 UG/DL

## 2024-08-21 NOTE — TELEPHONE ENCOUNTER
Called and LVM to call back. When was patient diagnosed with Cutaneous Lupus? Does she follow rheumatology or dermatology? If so, where?

## 2024-08-21 NOTE — TELEPHONE ENCOUNTER
"Return call from patient. States she was diagnosed with cutaneous lupus from a skin biopsy (in [place]), but since it wasn't \"full blown lupus\" her provider at the time didn't feel a referral to dermatology or rheumatology was needed. ANNIKA sent to patient for records from her visit with PCP & pathology report.   "

## 2024-08-22 ENCOUNTER — DOCUMENTATION ONLY (OUTPATIENT)
Dept: MATERNAL FETAL MEDICINE | Facility: CLINIC | Age: 34
End: 2024-08-22
Payer: COMMERCIAL

## 2024-08-22 DIAGNOSIS — R74.8 ELEVATED LIVER ENZYMES: Primary | ICD-10-CM

## 2024-09-08 ENCOUNTER — HEALTH MAINTENANCE LETTER (OUTPATIENT)
Age: 34
End: 2024-09-08

## 2024-09-15 NOTE — PROGRESS NOTES
Sourav is here alone for a routine prenatal visit at 14w4d. Reviewed IOB labs and dating ultrasound.   Disc option for single marker AFP ([id number]), pt desires to call her insurance to be sure that it is covered before she makes her decision. Minimal pregnancy discomforts, has trouble falling asleep but this is not a new problem for her as she has had insomnia for many years. Her first trimester nausea has subsided and she can feel her appetite slowly returning to baseline if not increasing. Fetal movements not present, reassurance provided. Accepting of flu shot today.     Patient signed a release form allowing access to records from her PCP in [place] that worked up her Cutaneous Lupus.  Records reviewed but incomplete and no information on cutaneous lupus noted.  Discussed referral to dermatology and patient accepts.   Per PCP recommendation, follow up thyroid labs will be drawn today.     Anatomy scan discussed and appointment with M scheduled for 10/17/24.  Also hoping for recommendations regarding patient's diagnosis of borderline fatty liver disease in pregnancy from [facility]. Can consider GI referral as indicated.     Early second trimester pregnancy anticipatory guidance reviewed.   NV: inquire about patient interest in AFP  Enc follow-up in 4 weeks or sooner prn.      THOMAS Rivera  I was present with the student who participated in the service and the documentation of the note. I have verified the history and personally performed the physical exam and medical decision making. I agree with the assessment and plan as documented in the note.   SHELDON Osorio, CNM

## 2024-09-16 ENCOUNTER — OFFICE VISIT (OUTPATIENT)
Dept: MIDWIFE SERVICES | Facility: CLINIC | Age: 34
End: 2024-09-16
Payer: COMMERCIAL

## 2024-09-16 VITALS
DIASTOLIC BLOOD PRESSURE: 68 MMHG | WEIGHT: 275 LBS | SYSTOLIC BLOOD PRESSURE: 106 MMHG | BODY MASS INDEX: 46.95 KG/M2 | HEART RATE: 76 BPM | HEIGHT: 64 IN

## 2024-09-16 DIAGNOSIS — Z91.89 AT RISK FOR COMPLICATION OF PREGNANCY: ICD-10-CM

## 2024-09-16 DIAGNOSIS — Z91.89 AT HIGH RISK FOR VENOUS THROMBOEMBOLISM (VTE): ICD-10-CM

## 2024-09-16 DIAGNOSIS — O09.90 SUPERVISION OF HIGH RISK PREGNANCY, ANTEPARTUM: Primary | ICD-10-CM

## 2024-09-16 DIAGNOSIS — L93.2 CUTANEOUS LUPUS ERYTHEMATOSUS: ICD-10-CM

## 2024-09-16 DIAGNOSIS — Z23 NEED FOR PROPHYLACTIC VACCINATION AND INOCULATION AGAINST INFLUENZA: ICD-10-CM

## 2024-09-16 DIAGNOSIS — R74.8 ELEVATED LIVER ENZYMES: ICD-10-CM

## 2024-09-16 LAB
T4 FREE SERPL-MCNC: 1.22 NG/DL (ref 0.9–1.7)
TSH SERPL DL<=0.005 MIU/L-ACNC: 2.84 UIU/ML (ref 0.3–4.2)

## 2024-09-16 PROCEDURE — 99207 PR PRENATAL VISIT: CPT | Performed by: ADVANCED PRACTICE MIDWIFE

## 2024-09-16 PROCEDURE — 36415 COLL VENOUS BLD VENIPUNCTURE: CPT | Performed by: ADVANCED PRACTICE MIDWIFE

## 2024-09-16 PROCEDURE — 90656 IIV3 VACC NO PRSV 0.5 ML IM: CPT | Performed by: ADVANCED PRACTICE MIDWIFE

## 2024-09-16 PROCEDURE — 90471 IMMUNIZATION ADMIN: CPT | Performed by: ADVANCED PRACTICE MIDWIFE

## 2024-09-16 PROCEDURE — 84439 ASSAY OF FREE THYROXINE: CPT | Performed by: ADVANCED PRACTICE MIDWIFE

## 2024-09-16 PROCEDURE — 84443 ASSAY THYROID STIM HORMONE: CPT | Performed by: ADVANCED PRACTICE MIDWIFE

## 2024-09-16 NOTE — PATIENT INSTRUCTIONS
"\"We hope you had a positive experience and that you can definitely recommend [facility] to your family and friends. You ll be receiving a survey soon and we look forward to hearing your feedback\".    [facility]- Contact information:  Appointment line and to get a hold of CNM in clinic Monday-Friday 8 am - 5 pm:  [phone].  There are some clinics with early start times (1st appointment 7:40 am) and others with evening hours (last appointment 6:20 pm).  Most are typically open from 8 am to 5 pm.    CNM on call answering service: [phone].  Specify your hospital of choice and leave a brief message for CNM;  will then page CNM who is on call at your specified hospital and you should receive a call back with 15 minutes.  Be sure that your ringer is audible and that you can accept blocked calls so that we can get back in touch with you! This number should be reserved for urgent needs if during the day, before 8 am, after 5 pm, weekends, holidays.    Contact the on-call CNM with warning signs, such as:  vaginal bleeding   Vaginal discharge and itching or pain and burning during urination  Leg/calf pain or swelling on one side  severe abdominal pain  nausea and vomiting more than 4-5 times a day, or if you are unable to keep anything down  fever more than 100.4 degrees F.     Clinicbookhart  After each of your visits you are welcome to check Proximiant for your visit summary including education and links to information relevant to your pregnancy and/or well woman care.   Find the \"Visits\" tab at the top of the page, you will see a list of recent visits and for each visit a for link for \"View After Visit Summary.\" View of your After Visit Summary will allow you to read our recommendations from your visit, review any education provided, and link to websites with useful information.   If you have any questions or difficulty navigating SportsHedge, please feel free to " "contact us and we will do our best to direct you.        Touring the Maternity Care Center  At this time we are offering a virtual tour of the Maternity Care Centers at both [facility] and [facility]:   [facility] Maternity Care:     [email or web address]-[email or web address]--[facility] Maternity Care:   [email or web address]--[facility]       Meet the Midwives from [facility]  You are invited to an informational meet and greet with [facility]'s [facility] Certified Nurse-Midwives. Our free \"Meet the Midwives\" event is a great opportunity to learn about our midwives' philosophy and experience, the hospitals where we can assist with your birth, and answer questions you may have. Partners, friends, and family are welcome to attend. Currently, this is a virtual event.  Date  Last Thursday of every month at 7 pm.    Link to next (live) meeting  [email or web address]  To Join by Telephone (audio only) Call:   [phone] Phone Conference ID: [phone] #    Ultrasound Appointment:   Don't forget to schedule your ultrasound appointment around 20 weeks into your pregnancy. Your midwife will order the exam for you to schedule at [phone] with [facility] radiology locations or at the independent radiology clinic of your preference.      Why is dental care in pregnancy important?  During pregnancy, you are more likely to have problems with your teeth or gums. If you have an infection in your teeth or gums, the chance of your baby being premature (born early) or having low birth weight may be slightly higher than if your teeth and gums are healthy  Dental care is safe during pregnancy and important for the health of you and your baby.   What should you know before you see the dentist?  Make sure your dentist knows that you are " pregnant.  If medications for infection or for pain are needed, your dentist can prescribe ones that are safe for you and your baby.  Tell your dentist about any changes you have noticed since you became pregnant and about any medications r or supplements you are taking.  Routine x-rays should be avoided in pregnancy, but it may be necessary if there is a problem or an emergency.   Your body should be covered with a lead apron to protect you and your baby.  Dental work can be done safely at any point in pregnancy. If possible, it is best to delay treatments and pro- cedures until after the first trimester.    For more information on dental health in pregnancy: http://onlinelibrary.iglesias.com/store/10.1111/jmwh.79271/asset/utse03194.pdf?v=1&t=boya0d71&m=6973[email or web address]     Quickening:   Your Baby in the Second Trimester of Pregnancy  At the start of the second trimester, you will feel your baby's movements, which get stronger as the baby grows bigger. At the end of the fourth month, your baby weighs about five ounces. Her kidneys begin to produce urine. During visits to your health care provider, you will be able to hear your baby's heartbeat more clearly. Your baby can move and hear your voice.   By the end of the fifth month, you'll be able to feel light movements (called quickening) of your fetus. Your baby is sleeping and waking at regular intervals, and is more active than before. At this point, she is about nine inches long and weighs about one-half to one pound. During the sixth month, your baby's features become clearer. Eyebrows, eyelashes, and hair are developing. She also has finger and toe prints, and may be kicking strongly.  By the end of the second trimester, your baby weighs as much as two pounds and is about 11 inches long.         Gestational diabetes  Gestational diabetes develops during pregnancy (gestation). Like other types of diabetes, gestational diabetes affects how  your cells use sugar (glucose). Gestational diabetes causes high blood sugar that can affect your pregnancy and your baby's health.  Any pregnancy complication is concerning, but there's good news. Expectant moms can help control gestational diabetes by eating healthy foods, exercising and, if necessary, taking medication. Controlling blood sugar can prevent a difficult birth and keep you and your baby healthy.  In gestational diabetes, blood sugar usually returns to normal soon after delivery. But if you've had gestational diabetes, you're at risk for type 2 diabetes. You'll continue working with your health care team to monitor and manage your blood sugar.    Who is at risk?  This is a list of factors that increase the risk of developing gestational diabetes for women during pregnancy:      Overweight prior to pregnancy (20% or more over ideal body weight)      High risk ethnic group: , , ,       Impaired glucose tolerance or traces of glucose in the urine      Family history of diabetes      Previously giving birth to a baby over 9 lbs. or stillborn      Previous pregnancy with gestational diabetes    Prevention:  There are no guarantees when it comes to preventing gestational diabetes -- but the more healthy habits you can adopt before pregnancy, the better. If you've had gestational diabetes, these healthy choices may also reduce your risk of having it in future pregnancies or developing type 2 diabetes down the road.  Eat healthy foods. Choose foods high in fiber and low in fat and calories. Focus on fruits, vegetables and whole grains. Strive for variety to help you achieve your goals without compromising taste or nutrition. Watch portion sizes.   Keep active. Exercising before and during pregnancy can help protect you from developing gestational diabetes. Aim for 30 minutes of moderate activity on most days of the week. Take a brisk daily walk. Ride your bike. Swim  laps.  If you can't fit a single 30-minute workout into your day, several shorter sessions can do just as much good. Park in the distant lot when you run errands. Get off the bus one stop before you reach your destination. Every step you take increases your chances of staying healthy.  Lose excess pounds before pregnancy. Doctors don't recommend weight loss during pregnancy. But if you're planning to get pregnant, losing extra weight beforehand may help you have a healthier pregnancy.  Focus on permanent changes to your eating habits. Motivate yourself by remembering the long-term benefits of losing weight, such as a healthier heart, more energy and improved self-esteem.    Preventing Diabetes after Pregnancy:  It is estimated that 35-60 percent of women that have had gestational diabetes will develop type 2 diabetes in the future. It is also thought that children from these pregnancies have a greater chance of developing obesity and type 2 diabetes.    If you do have prediabetes or have risk factors for having diabetes, research shows that doing just two things can help you prevent or delay type 2 diabetes: Lose 5% to 7% of your body weight, which would be 10 to 14 pounds for a 200-pound person; and get at least 150 minutes each week of physical activity, such as brisk walking.        RESOURCES    Breastfeeding Information:  OUTPATIENT LACTATION RESOURCES    -Schedule a clinic appointment with a [facility] with dedicated clinic hours for breastfeeding assistance by calling [phone]. Breastfeeding clinic visits are at [facility] on , [facility] on  and [facility] on .     New Parent Connection:   [place], [address] [place] [address]  In-person meetings on  from 6 pm - 7:15 pm for parents of  to 9 months, at the same site.   All are free, drop-in, no registration required.    There are also free virtual  meetings ongoing on Tuesdays:  11:30 am - 12:30 pm for parents of newborns to 3 months  4:15 pm to 5:15 pm for parents of 3 to 9-month olds  For joining info parents should call Nereida Kvng at [phone]    -Attend a baby weigh in at [facility].  Lactation consultants are available to answer questions  [place]: Tuesdays 1:00 - 2:00  [facility]: Mondays 1:00 - 2:00   www.ShomoLive    -Attend one of the New Mama groups at [facility] in [facility] [place].  [facility] also offers one-on-one in home and in office lactation consults.   www.Plynked    -Attend a LeLeche League meeting.  Multiple groups in several locations throughout the [facility]. The meetings are no-cost and always informative breastfeeding education session through [employer] La Leche League  Www.lllondas.org/    -Medication use while breastfeeding: http://toxnet.nlm.nih.gov/newtoxnet/lactmed.htm     Childbirth and Parenting Education:     Everyday Miracles:   https://www.everyday-miracles.org/    Free Video Series from [facility]: https://nursing.[facility]/academics/specialty-areas/nurse-midwifery/having-baby-prenatal-videos/having-baby-prenatal-and    Childbirth Education virtual (live) classes: www.Benchling/classes  The Birth Hour: https://UtiliData/online-childbirth-class/  BirthED: https://www.birthedmn.com/  [place] parenting center: http://ShomoLive/   (451) 778-BABY  [place]: (education, yoga & wellness) www.[employer]  [facility]: www.Plynked   Childbirth collective: (Parent topic nights)  www.childbirthcollective.org/  Hypnobabies:  www.hypnobabiestAllasso IndustriesciKleermail.com/  Hypnobirthing:  Http://hypnBasis Sc[employer]ona Technologies/  Hypnobirthing virtual class: www.EverySignal/hypnobirthing    Information about doulas:  Childbirth collective: http://www.childbirthcollective.org/  Doulas of [place] (DONNIE):  www.donnie.org  [facility]  project: http://Picostorm Code Labscitiesdoulaproject.com/     Early Childhood and Family Education (ECFE):  ECFE offers parents hands-on learning experiences that will nourish a lifetime of teachable moments.  http://ecfe.info/ecfe-home/    APPS and Podcasts:   Sammyia  Caryn Corbettture    Evidence Based Birth  The Birth Hour (for birth stories)   Birthful   Expectful   The Longest Shortest Time  PregnancyPodcast Jessica Rojas  https://www.downtobirthshow.com/    Book Recommendations:   Caroline Abi's Birthing From Within--first few chapters include a new-age tone, you may prefer to skip it and keep going, because there is good stuff later.  This book recommendation covers emotional preparation, but does cover coping with pain, and use of both pharmacological and nonpharmacological methods.    Guide to Childbirth by Jeri Roe  Childbirth Without Fear by Rob Cody Read    Dr. Sanchez' The Pregnancy Book and The Birth Book--the pregnancy book goes month-by month    The Birth Partner by Anisa Malik    Womanly Art of Breastfeeding by La Leche League International   Bestfeeding by Laney Israel--great pictures    Mothering Your Nursing Toddler, by Faviola Riggs.   Addresses dealing with so many of the challenging behaviors of a nursing toddler.  How Weaning Happens, by La Leche League.  Discusses weaning at all ages, from medically necessary weaning of an infant, all the way up to age 5 (or older), with why/why not, and strategies.  Very empowering book both for deciding to wean and deciding not to.    American College of Nurse-Midwives (ACNM) http://www.midwife.org/; look at the informational handouts at http://www.midwife.org/Share-With-Women     www.mymidwife.org    Mother to Baby (Medication and Herbal guidance in pregnancy): http://www.mothertobaby.org  Toll-Free Hotline: 208.398.7888  LactMed (Medication use while breastfeeding): http://toxnet.nlm.nih.gov/newtoxnet/lactmed.htm    Women's  "Health.gov:  http://www.womenshealth.gov/a-z-topics/index.html    American pregnancy association - http://americanpregnancy.org    Centering Pregnancy (group prenatal care option): http://centeringhealthcare.org    March of Dimes www.GooodJob     FDA - Nutrition  www.mypyramid.gov  Under \"For Consumers,\" click on \"pregnant and breastfeeding women.\"      Centers for Disease Control and Prevention (CDC) - Vaccines : http://www.cdc.gov/vaccines/       When researching information on the web, question the validity of websites.  The [place] .gov, .edu and.org tend to be more reliable information.  If there are a lot of advertisements, be cautious of the information provided. Stay away from blogs and chat rooms please!     "

## 2024-10-07 ENCOUNTER — MYC REFILL (OUTPATIENT)
Dept: FAMILY MEDICINE | Facility: CLINIC | Age: 34
End: 2024-10-07
Payer: COMMERCIAL

## 2024-10-07 DIAGNOSIS — Z33.1 PREGNANCY, INCIDENTAL: ICD-10-CM

## 2024-10-07 DIAGNOSIS — E03.8 SUBCLINICAL HYPOTHYROIDISM: Primary | ICD-10-CM

## 2024-10-07 DIAGNOSIS — F32.9 MAJOR DEPRESSIVE DISORDER WITH CURRENT ACTIVE EPISODE, UNSPECIFIED DEPRESSION EPISODE SEVERITY, UNSPECIFIED WHETHER RECURRENT: ICD-10-CM

## 2024-10-07 RX ORDER — LEVOTHYROXINE SODIUM 25 UG/1
25 TABLET ORAL DAILY
Qty: 90 TABLET | Refills: 3 | Status: SHIPPED | OUTPATIENT
Start: 2024-10-07 | End: 2024-10-07

## 2024-10-07 RX ORDER — BUPROPION HYDROCHLORIDE 300 MG/1
300 TABLET ORAL EVERY MORNING
Qty: 90 TABLET | Refills: 3 | Status: SHIPPED | OUTPATIENT
Start: 2024-10-07

## 2024-10-07 RX ORDER — LEVOTHYROXINE SODIUM 25 UG/1
25 TABLET ORAL DAILY
Qty: 90 TABLET | Refills: 3 | Status: SHIPPED | OUTPATIENT
Start: 2024-10-07

## 2024-10-08 ENCOUNTER — PRE VISIT (OUTPATIENT)
Dept: MATERNAL FETAL MEDICINE | Facility: CLINIC | Age: 34
End: 2024-10-08
Payer: COMMERCIAL

## 2024-10-10 NOTE — TELEPHONE ENCOUNTER
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

## 2024-10-11 ENCOUNTER — OFFICE VISIT (OUTPATIENT)
Dept: MATERNAL FETAL MEDICINE | Facility: CLINIC | Age: 34
End: 2024-10-11
Attending: OBSTETRICS & GYNECOLOGY
Payer: COMMERCIAL

## 2024-10-11 ENCOUNTER — LAB (OUTPATIENT)
Dept: LAB | Facility: CLINIC | Age: 34
End: 2024-10-11
Attending: OBSTETRICS & GYNECOLOGY
Payer: COMMERCIAL

## 2024-10-11 ENCOUNTER — HOSPITAL ENCOUNTER (OUTPATIENT)
Dept: ULTRASOUND IMAGING | Facility: CLINIC | Age: 34
Discharge: HOME | End: 2024-10-11
Attending: OBSTETRICS & GYNECOLOGY
Payer: COMMERCIAL

## 2024-10-11 VITALS
OXYGEN SATURATION: 96 % | HEART RATE: 91 BPM | SYSTOLIC BLOOD PRESSURE: 123 MMHG | RESPIRATION RATE: 20 BRPM | DIASTOLIC BLOOD PRESSURE: 84 MMHG

## 2024-10-11 DIAGNOSIS — R74.8 ELEVATED LIVER ENZYMES: ICD-10-CM

## 2024-10-11 DIAGNOSIS — L93.2 CUTANEOUS LUPUS ERYTHEMATOSUS: ICD-10-CM

## 2024-10-11 DIAGNOSIS — O26.612 LIVER DISORDER DURING PREGNANCY IN SECOND TRIMESTER: Primary | ICD-10-CM

## 2024-10-11 DIAGNOSIS — O28.3 ABNORMAL PRENATAL ULTRASOUND: ICD-10-CM

## 2024-10-11 DIAGNOSIS — O35.03X0 CHOROID PLEXUS CYST OF FETUS AFFECTING CARE OF MOTHER, ANTEPARTUM, SINGLE OR UNSPECIFIED FETUS: ICD-10-CM

## 2024-10-11 DIAGNOSIS — O99.210 OBESITY DURING PREGNANCY: ICD-10-CM

## 2024-10-11 DIAGNOSIS — O28.3 ABNORMAL PRENATAL ULTRASOUND: Primary | ICD-10-CM

## 2024-10-11 PROCEDURE — 76811 OB US DETAILED SNGL FETUS: CPT

## 2024-10-11 PROCEDURE — 999N000069 HC STATISTIC GENETIC COUNSELING, < 16 MIN: Performed by: GENETIC COUNSELOR, MS

## 2024-10-11 PROCEDURE — 36415 COLL VENOUS BLD VENIPUNCTURE: CPT

## 2024-10-11 PROCEDURE — 86235 NUCLEAR ANTIGEN ANTIBODY: CPT

## 2024-10-11 PROCEDURE — 76811 OB US DETAILED SNGL FETUS: CPT | Mod: 26 | Performed by: OBSTETRICS & GYNECOLOGY

## 2024-10-11 PROCEDURE — 99204 OFFICE O/P NEW MOD 45 MIN: CPT | Mod: 25 | Performed by: OBSTETRICS & GYNECOLOGY

## 2024-10-11 PROCEDURE — 99211 OFF/OP EST MAY X REQ PHY/QHP: CPT | Performed by: OBSTETRICS & GYNECOLOGY

## 2024-10-11 NOTE — PROGRESS NOTES
[facility]  Genetic Counseling Consult    Patient:  Sourav Mascorro YOB: 1990   Date of Service:  10/11/24   MRN: [id number]    Sourav was seen at the [facility] for ultrasound. I met with the patient following her ultrasound at the request of Dr. Abarca to review today's findings and options for testing for fetal chromosome abnormalities.  The patient was accompanied by her partner, David to today's visit.     IMPRESSION/ PLAN   1. Sourav had a comprehensive (level II) ultrasound today.  Please see the ultrasound report for further details.    2. Today's ultrasound revealed CPC. She has not had screening in this pregnancy. Sourav elected to proceed with [facility] NIPT through [employer]. She opted to screen for sex chromosome aneuploidies, and microdeletion conditions. Results are expected in 10-14 days. Sourav provided verbal permission for results to be left on her voicemail. She requested the results do not include predicted fetal sex information as they are not planning to find out in the pregnancy. Patient was informed that results will also be available via GetBack.    PREGNANCY HISTORY   /Parity:    Age at Delivery: 34 year old  Gestational Age: 18w3d   ROLY: 3/11/2025, by Last Menstrual Period                FAMILY HISTORY   The reported family history is unremarkable for multiple miscarriages, stillbirths, birth defects, intellectual disabilities, known genetic conditions, and consanguinity.       RISK ASSESSMENT FOR CHROMOSOME CONDITIONS   We explained that the risk for fetal chromosome abnormalities increases with maternal age. We discussed specific features of common chromosome abnormalities, including Down syndrome, trisomy 13, trisomy 18, and sex chromosome trisomies.    At age 34 at midtrimester, the risk to have a baby with Down syndrome is 1 in 342.   At age 34 at midtrimester, the risk to  have a baby with any chromosome abnormality is 1 in  172.     Today's ultrasound revealed choroid plexus cysts (CPC) which is a fluid filled cyst within a part of the brain that makes cerebrospinal fluid called the coroid plexus. Approximately 1% of second trimester prenatal ultrasounds reveal a CPC. In isolation, the finding of a CPC are not thought to increase aneuploidy risks. In the presence of other aneuploidy markers, CPC may be one feature of a condition, such as trisomy 18. We reviewed the risk for this condition in this pregnancy is still expected to be less than 1%.     GENETIC TESTING OPTIONS   Genetic testing during a pregnancy includes screening and diagnostic procedures.      We discussed the following screening options:   Non-invasive prenatal testing (NIPT)  Also called cell-free DNA screening because it detects chromosomes from the placenta in the pregnant person's blood  Can be done any time after 10 weeks gestation  Screens for trisomy 21, trisomy 18, trisomy 13, and sex chromosome aneuploidies  Cannot screen for open neural tube defects, maternal serum AFP after 15 weeks is recommended  We also discussed that current ACMG guidelines recommend that screening for 22q11.2 deletion syndrome be offered to all pregnant patients. 22q11.2 deletion syndrome has an estimated prevalence of 1 in 990 to 1 in 2148 (0.05-0.1%). Risk is not thought to increase with maternal age. Clinical features are variable but include congenital heart defects, cleft palate, developmental delays, immune system deficiencies, and hearing loss. Approximately 90% of cases are de tanika (a sporadic new change in a pregnancy). Cell-free DNA screening for 22q11.2 deletion syndrome is available with the inclusion of other microdeletion syndromes that are not currently recommended by society guidelines. We discussed the limitations of cell-free DNA screening in detecting microdeletions and the possiblity of false positives and false  negatives.      We discussed the following diagnostic options:   Amniocentesis  Invasive diagnostic procedure done after 15 weeks gestation  The procedure collects a small sample of amniotic fluid for the purpose of chromosomal testing and/or other genetic testing  Diagnostic result; more than 99% sensitivity for fetal chromosome abnormalities  Testing for AFP in the amniotic fluid can test for open neural tube defects    The benefits and limitations of noninvasive screening were reviewed. Screening tests provide a risk assessment (chance) specific to the pregnancy for certain fetal chromosome abnormalities but cannot definitively diagnose or exclude a fetal chromosome abnormality. Follow-up genetic counseling and consideration of diagnostic testing is recommended with any abnormal screening result. Diagnostic testing during a pregnancy is more certain and can test for more conditions. However, the tests do have a risk of miscarriage that requires careful consideration. These tests can detect fetal chromosome abnormalities with greater than 99% certainty. Results can be compromised by maternal cell contamination or mosaicism and are limited by the resolution of current genetic testing technology. There is no screening or diagnostic test that detects all forms of birth defects or intellectual disability.     It was a pleasure to be involved with Sourav s care. Face-to-face time of the meeting was 15 minutes.    Matilde Callahan MS, [facility]  Licensed Genetic Counselor  [facility]  Maternal Fetal Medicine  Ph: [phone]  [email or web address]

## 2024-10-11 NOTE — NURSING NOTE
"Sourav is in MFM today for a Radiologic consult for BMI >40 and borderline fatty liver disease. Patient reports \"maybe\" starting to feel fetal movement, denies contractions, leaking of fluid, or bleeding.  Education provided to patient on today's ultrasound and recommendations as noted in the consult note.  SBAR given to MFM MD, see their note in Epic.      "

## 2024-10-11 NOTE — PROGRESS NOTES
"Please see \"Imaging\" tab under \"Chart Review\" for details of today's visit, which is summarized below:    Impression  =========    1) Mason intrauterine pregnancy at 18w 3d gestational age.  2) A right choroid plexus cyst is seen on today's US. Otherwise, none of the anomalies commonly detected by ultrasound were evident in the detailed fetal anatomic survey described above, although evaluation of fetal anatomy was suboptimal as noted above.  3) Growth parameters and estimated fetal weight were consistent with an appropriate for gestation age pattern of growth.  4) The amniotic fluid volume appeared normal.    Recommendation  ==============    We discussed the findings on today's ultrasound with the patient.    We first reviewed Sourav's history of cutaneous lupus diagnosed in 2022 due to right forearm rash. She has not been diagnosed with SLE and states she is scheduled to follow up with Dermatology on 10/22 but has not required any treatment for this. We reviewed that pregnancy outcomes for women with JOSE are generally very favorable as long as there is no progression to SLE. Souarv has normal renal function and her LFT are now within normal limits. Given her history of autoimmune conditions, we did draw SSA/SSB antibodies today and will call her with plans for follow up if positive.    Regarding her history of borderline hepatic steatosis by ultrasound with normal LFT now, I would recommend monthly monitoring of LFT as hepatic steatosis has been associated with an increased risk for preeclampsia. Sourav has already been started on a low dose aspirin.    A repeat US has been scheduled here in 3 weeks to re-evaluate fetal growth and anatomy. Following this, given maternal autoimmune history and BMI 48 recommend serial growth US every 4 weeks starting at 28 weeks and weekly BPP at 34 weeks. Delivery in the 39th week is recommended as well.    We also reviewed that on today s ultrasound a right choroid plexus " cyst (CPC) was noted. We discussed that CPCs are seen in 1-2% of all pregnancies in the second trimester and in the absence of other anatomic abnormalities or soft markers the risk of underlying aneuploidy is low (LR < 2 for Trisomy 18). We reviewed the limitations of ultrasound and its limitations in detecting aneuploidy. Since she has not yet had genetic screening this pregnancy, we reviewed the availability of cell free DNA screening for risk refinement, which Sourav opted to proceed with today. Finally, we discussed that CPCs are not considered a structural or functional brain abnormality and therefore have no impact on structural brain development or function and they do not require any follow-up prenatal or postnatally.    We will plan to see Sourav back here in 3 weeks to re-evaluate fetal growth and anatomy but will let her know if additional monitoring is needed based on SSA/SSB antibodies drawn today.    Return to primary provider for continued prenatal care    Thank-you for the opportunity to participate in the care of this patient. If you have questions regarding today's evaluation or if we can be of further service, please contact the [facility].    **Fetal anomalies may be present but not detected**    I spent a total of 45 minutes on the date of this encounter including preparing to see the patient (reviewing medical records/tests), in direct face-to-face contact with the patient during her visit with the majority spent counseling and discussing the plan of care and documenting the visit in the electronic medical record. Please see note for details.    Priyanka Abarca

## 2024-10-14 LAB
ENA SS-A AB SER IA-ACNC: <0.5 U/ML
ENA SS-A AB SER IA-ACNC: NEGATIVE
ENA SS-B IGG SER IA-ACNC: <0.6 U/ML
ENA SS-B IGG SER IA-ACNC: NEGATIVE

## 2024-10-16 ENCOUNTER — OFFICE VISIT (OUTPATIENT)
Dept: FAMILY MEDICINE | Facility: CLINIC | Age: 34
End: 2024-10-16
Payer: COMMERCIAL

## 2024-10-16 ENCOUNTER — MYC MEDICAL ADVICE (OUTPATIENT)
Dept: FAMILY MEDICINE | Facility: CLINIC | Age: 34
End: 2024-10-16

## 2024-10-16 VITALS
WEIGHT: 274 LBS | SYSTOLIC BLOOD PRESSURE: 124 MMHG | RESPIRATION RATE: 16 BRPM | OXYGEN SATURATION: 98 % | BODY MASS INDEX: 44.03 KG/M2 | HEART RATE: 91 BPM | HEIGHT: 66 IN | TEMPERATURE: 98.1 F | DIASTOLIC BLOOD PRESSURE: 70 MMHG

## 2024-10-16 DIAGNOSIS — N76.0 BV (BACTERIAL VAGINOSIS): ICD-10-CM

## 2024-10-16 DIAGNOSIS — H92.01 RIGHT EAR PAIN: ICD-10-CM

## 2024-10-16 DIAGNOSIS — R30.0 DYSURIA: Primary | ICD-10-CM

## 2024-10-16 DIAGNOSIS — B96.89 BV (BACTERIAL VAGINOSIS): ICD-10-CM

## 2024-10-16 LAB
ALBUMIN UR-MCNC: NEGATIVE MG/DL
APPEARANCE UR: CLEAR
BACTERIA #/AREA URNS HPF: ABNORMAL /HPF
BILIRUB UR QL STRIP: NEGATIVE
CLUE CELLS: PRESENT
COLOR UR AUTO: YELLOW
GLUCOSE UR STRIP-MCNC: NEGATIVE MG/DL
HGB UR QL STRIP: NEGATIVE
KETONES UR STRIP-MCNC: NEGATIVE MG/DL
LEUKOCYTE ESTERASE UR QL STRIP: NEGATIVE
NITRATE UR QL: NEGATIVE
PH UR STRIP: 6.5 [PH] (ref 5–8)
RBC #/AREA URNS AUTO: ABNORMAL /HPF
SP GR UR STRIP: <=1.005 (ref 1–1.03)
SQUAMOUS #/AREA URNS AUTO: ABNORMAL /LPF
TRICHOMONAS, WET PREP: ABNORMAL
UROBILINOGEN UR STRIP-ACNC: 0.2 E.U./DL
WBC #/AREA URNS AUTO: ABNORMAL /HPF
WBC'S/HIGH POWER FIELD, WET PREP: ABNORMAL
YEAST, WET PREP: ABNORMAL

## 2024-10-16 PROCEDURE — 81001 URINALYSIS AUTO W/SCOPE: CPT

## 2024-10-16 PROCEDURE — 87086 URINE CULTURE/COLONY COUNT: CPT

## 2024-10-16 PROCEDURE — 87210 SMEAR WET MOUNT SALINE/INK: CPT

## 2024-10-16 PROCEDURE — 99203 OFFICE O/P NEW LOW 30 MIN: CPT

## 2024-10-16 RX ORDER — METRONIDAZOLE 500 MG/1
500 TABLET ORAL 2 TIMES DAILY
Qty: 14 TABLET | Refills: 0 | Status: SHIPPED | OUTPATIENT
Start: 2024-10-16 | End: 2024-10-16

## 2024-10-16 ASSESSMENT — ASTHMA QUESTIONNAIRES
ACT_TOTALSCORE: 24
QUESTION_4 LAST FOUR WEEKS HOW OFTEN HAVE YOU USED YOUR RESCUE INHALER OR NEBULIZER MEDICATION (SUCH AS ALBUTEROL): NOT AT ALL
QUESTION_1 LAST FOUR WEEKS HOW MUCH OF THE TIME DID YOUR ASTHMA KEEP YOU FROM GETTING AS MUCH DONE AT WORK, SCHOOL OR AT HOME: NONE OF THE TIME
QUESTION_2 LAST FOUR WEEKS HOW OFTEN HAVE YOU HAD SHORTNESS OF BREATH: ONCE OR TWICE A WEEK
QUESTION_5 LAST FOUR WEEKS HOW WOULD YOU RATE YOUR ASTHMA CONTROL: COMPLETELY CONTROLLED
QUESTION_3 LAST FOUR WEEKS HOW OFTEN DID YOUR ASTHMA SYMPTOMS (WHEEZING, COUGHING, SHORTNESS OF BREATH, CHEST TIGHTNESS OR PAIN) WAKE YOU UP AT NIGHT OR EARLIER THAN USUAL IN THE MORNING: NOT AT ALL
ACT_TOTALSCORE: 24

## 2024-10-16 ASSESSMENT — PATIENT HEALTH QUESTIONNAIRE - PHQ9
SUM OF ALL RESPONSES TO PHQ QUESTIONS 1-9: 6
10. IF YOU CHECKED OFF ANY PROBLEMS, HOW DIFFICULT HAVE THESE PROBLEMS MADE IT FOR YOU TO DO YOUR WORK, TAKE CARE OF THINGS AT HOME, OR GET ALONG WITH OTHER PEOPLE: NOT DIFFICULT AT ALL
SUM OF ALL RESPONSES TO PHQ QUESTIONS 1-9: 6

## 2024-10-16 ASSESSMENT — ENCOUNTER SYMPTOMS
DIFFICULTY URINATING: 0
SORE THROAT: 0
DYSURIA: 1
FEVER: 0
SHORTNESS OF BREATH: 0
HEMATURIA: 0
COUGH: 0

## 2024-10-16 ASSESSMENT — PAIN SCALES - GENERAL: PAINLEVEL: NO PAIN (0)

## 2024-10-16 NOTE — PROGRESS NOTES
Assessment & Plan     Dysuria  Reports dysuria during urination and following.  UA today is unremarkable.  I will run into culture due to pregnancy status and symptoms.  We did discuss how BV or yeast infection untreated could cause similar symptoms.  Wet prep collected.  Wet prep does have white blood cells and clue cells on it, could consider treatment for BV.  Will call and discussed with patient as we could also watch this as she has not noticed an increased amount of discharge, however does note discharge throughout pregnancy making it difficult to determine if there is a change or not.  No pain with examination of the abdomen today, no appreciated lymphadenopathy.  Does note intermittent pelvic pain, but this is also not been uncommon during pregnancy.  Feeling well otherwise.  Attempted to call patient on 10/16 around 2:30 PM with no answer.  Will try again.    Talked with patient, discussed with watchful waiting versus treatment.  Would like treatment due to noticed discharge and pelvic pain, reviewed recommendations from up-to-date with patient.  Will treat with oral metronidazole twice daily for 7 days.  Will CC chart to midwife.  - Urine Culture  - Wet prep - Clinic Collect    Right ear pain  Ear exam today is within normal limits.  No evidence of AOM or AOE.  No discharge or drainage.  No pain at the mastoid, no pain with palpation of the tragus or other areas of the ear.  Discussed that there could be some congestion due to patient being pregnant and laying on the side irritated this, reassuring that it only happened once and improved when awakening.  If presents again recommend follow-up.  If any other symptoms of illness recommend follow-up.  Patient is agreeable with plan.    Cliff Benjamin is a 34 year old, presenting for the following health issues:  Dysuria (2 DAYS AGO BURNING WITH URINATION ) and Ear Problem (RIGHT EAR PAIN )      10/16/2024    12:48 PM   Additional Questions   Roomed by  "CIARA     History of Present Illness       Reason for visit:  UTI and ear pain  Symptom onset:  1-3 days ago  Symptoms include:  Pain while peeing and a painful earache when sleepinh  Symptom intensity:  Moderate  Symptom progression:  Worsening  Had these symptoms before:  No  What makes it worse:  No  What makes it better:  No   She is taking medications regularly.     She is currently 19 weeks pregnant.     She has noticed some burning with urination and afterwards. Some pelvic pain/cramping but nothing she thought to be alarming. Has had pretty steady discharge during pregnancy. No blood. No spotting.     With the ear, noticed this morning that the right ear was hurting so bad it was painful throbbing and inflamed. Felt like a burning pain. Is not still hurting. Was laying on their side when they woke up. The left ear is not bothersome. NO drainage. Not feeling sick otherwise. No eye drainage. No cough wheezing.     Review of Systems   Constitutional:  Negative for fever.   HENT:  Positive for ear pain. Negative for ear discharge, hearing loss and sore throat.    Respiratory:  Negative for cough and shortness of breath.    Genitourinary:  Positive for dysuria. Negative for difficulty urinating, hematuria, pelvic pain, vaginal bleeding and vaginal discharge.           Objective    /70 (BP Location: Right arm, Patient Position: Sitting)   Pulse 91   Temp 98.1  F (36.7  C) (Oral)   Resp 16   Ht 1.67 m (5' 5.75\")   Wt 124.3 kg (274 lb)   LMP 06/04/2024 (Exact Date)   SpO2 98%   BMI 44.56 kg/m    Body mass index is 44.56 kg/m .  Physical Exam  Constitutional:       General: She is not in acute distress.  HENT:      Right Ear: Tympanic membrane, ear canal and external ear normal.      Left Ear: Tympanic membrane, ear canal and external ear normal.      Ears:      Comments: No pain with palpation or manipulation of the external ear, tragus, or mastoid.     Mouth/Throat:      Pharynx: No oropharyngeal " exudate or posterior oropharyngeal erythema.   Eyes:      General:         Right eye: No discharge.         Left eye: No discharge.      Extraocular Movements: Extraocular movements intact.      Conjunctiva/sclera: Conjunctivae normal.      Pupils: Pupils are equal, round, and reactive to light.   Cardiovascular:      Rate and Rhythm: Normal rate and regular rhythm.      Heart sounds: Normal heart sounds.   Pulmonary:      Effort: No respiratory distress.      Breath sounds: Normal breath sounds. No wheezing.   Skin:     General: Skin is warm and dry.   Neurological:      General: No focal deficit present.      Mental Status: She is alert.   Psychiatric:         Mood and Affect: Mood normal.         Thought Content: Thought content normal.        At the end of the visit, I confirmed understanding of what was discussed. Sourav has no further questions or concerns that were brought up at this time.      Tracey Georges DNP, APRN, FNP-C

## 2024-10-17 LAB — BACTERIA UR CULT: NORMAL

## 2024-10-17 RX ORDER — METRONIDAZOLE 500 MG/1
500 TABLET ORAL 2 TIMES DAILY
Qty: 14 TABLET | Refills: 0 | Status: SHIPPED | OUTPATIENT
Start: 2024-10-17

## 2024-10-21 ENCOUNTER — TELEPHONE (OUTPATIENT)
Dept: MATERNAL FETAL MEDICINE | Facility: CLINIC | Age: 34
End: 2024-10-21
Payer: COMMERCIAL

## 2024-10-21 LAB — SCANNED LAB RESULT: NORMAL

## 2024-10-21 NOTE — TELEPHONE ENCOUNTER
October 21, 2024  I spoke with Sourav regarding her Prequel  NIPT results.     Results indicate NO ANEUPLOIDY DETECTED for chromosomes 21, 18, 13, or sex chromosomes. Testing for the select microdeletions was also included and screen negative. Predicted sex of baby was not disclosed per patient request.     This puts her current pregnancy at low risk for Down syndrome, trisomy 18, trisomy 13 and sex chromosome abnormalities. This test is reported to have the following sensitivities: Down syndrome: 99.7%, trisomy 18: 97.9%, and trisomy 13: 99.0%. Although these results are reassuring, this does not replace a standard chromosome analysis from a chorionic villus sampling or amniocentesis.      MSAFP is the appropriate second trimester screening test for open neural tube defects; the maternal quad screen is not recommended.     Her results are available in her Epic chart for her primary OB to review.     Matilde Callahan MS, [facility]  Licensed Genetic Counselor  [facility]  Maternal Fetal Medicine  Ph: [phone]  [email or web address]

## 2024-10-22 ENCOUNTER — PRE VISIT (OUTPATIENT)
Dept: DERMATOLOGY | Facility: CLINIC | Age: 34
End: 2024-10-22

## 2024-10-22 ENCOUNTER — OFFICE VISIT (OUTPATIENT)
Dept: DERMATOLOGY | Facility: CLINIC | Age: 34
End: 2024-10-22
Attending: ADVANCED PRACTICE MIDWIFE
Payer: COMMERCIAL

## 2024-10-22 DIAGNOSIS — L21.9 DERMATITIS, SEBORRHEIC: ICD-10-CM

## 2024-10-22 DIAGNOSIS — R74.8 ELEVATED LIVER ENZYMES: ICD-10-CM

## 2024-10-22 DIAGNOSIS — R21 RASH: Primary | ICD-10-CM

## 2024-10-22 DIAGNOSIS — L93.2 CUTANEOUS LUPUS ERYTHEMATOSUS: ICD-10-CM

## 2024-10-22 DIAGNOSIS — O09.90 SUPERVISION OF HIGH RISK PREGNANCY, ANTEPARTUM: ICD-10-CM

## 2024-10-22 PROCEDURE — 88312 SPECIAL STAINS GROUP 1: CPT | Mod: 26 | Performed by: PATHOLOGY

## 2024-10-22 PROCEDURE — 88305 TISSUE EXAM BY PATHOLOGIST: CPT | Mod: TC | Performed by: STUDENT IN AN ORGANIZED HEALTH CARE EDUCATION/TRAINING PROGRAM

## 2024-10-22 PROCEDURE — 11104 PUNCH BX SKIN SINGLE LESION: CPT | Mod: GC | Performed by: STUDENT IN AN ORGANIZED HEALTH CARE EDUCATION/TRAINING PROGRAM

## 2024-10-22 PROCEDURE — 99204 OFFICE O/P NEW MOD 45 MIN: CPT | Mod: 25 | Performed by: STUDENT IN AN ORGANIZED HEALTH CARE EDUCATION/TRAINING PROGRAM

## 2024-10-22 PROCEDURE — 88305 TISSUE EXAM BY PATHOLOGIST: CPT | Mod: 26 | Performed by: PATHOLOGY

## 2024-10-22 RX ORDER — KETOCONAZOLE 20 MG/ML
SHAMPOO, SUSPENSION TOPICAL DAILY PRN
Qty: 120 ML | Refills: 3 | Status: SHIPPED | OUTPATIENT
Start: 2024-10-22

## 2024-10-22 NOTE — LETTER
10/22/2024       RE: Sourav Mascorro  [address] [place] [id number]     Dear Colleague,    Thank you for referring your patient, Sourav Mascorro, to the [facility] [place] at [facility]. Please see a copy of my visit note below.    I have personally examined this patient and agree with the resident doctor's documentation and plan of care. I have reviewed and amended the resident's note. The documentation accurately reflects my clinical observations, diagnoses, treatment and follow-up plans. I was present for key portions of the procedure(s).       Joshua Sheth MD  Dermatology Staff        [facility] Dermatology Note  Encounter Date: Oct 22, 2024  Office Visit     Dermatology Problem List:  PREGNANT, ~20w gest age as of 10/22/24.   # Ill-defined red patch, R forearm  - Reportedly prior outside bx c/w cutaneous lupus (2022 in [place], no records)  - Punch bx (10/22/24): pending  - RAINE (10/22/24): pending  # Seb derm  - ketoconazole shampoo       ____________________________________________    Assessment & Plan:     # Rash, R forearm.  - Non-specific faint red, ill-defined patch on the R forearm that reportedly worsens with sun exposure. Reportedly a biopsy from outside health system was consistent with cutaneous lupus. On presentation today, low clinical suspicion for cutaneous lupus regarding the rash on the forearm and no rash elsewhere. She has been unable to retrieve records and does desire repeat biopsy for further diagnostic clarification/confirmation, which is certainly reasonable.  The rash is currently not terribly bothersome to her, but she would be interested in treating it pending biopsy results.     # Seborrheic dermatitis, scalp  - Start ketoconazole shampoo.     Procedures Performed:   - Punch biopsy procedure note, location(s): R forearm. After discussion of benefits and risks including but not  limited to bleeding, infection, scar, incomplete removal, recurrence, and non-diagnostic biopsy, verbak consent and photographs were obtained. The area was cleaned with isopropyl alcohol. 0.5mL of 1% lidocaine with epinephrine was injected to obtain adequate anesthesia and a 4 mm punch biopsy was performed at site(s). 4-0 Prolene sutures were utilized to approximate the epidermal edges. White petrolatum ointment and a bandage was applied to the wound. Explicit verbal and written wound care instructions were provided. The patient left the dermatology clinic in good condition.     Follow-up: tbd pending results    Staff and Resident:     Resident:  I saw and discussed the patient with the attending physician, Dr. Meryl Long MD, PhD  PGY-4 Dermatology Resident       ____________________________________________    CC: Derm Problem (Sourav is here today for a rash that was biopsied as cutaneous lupus per midwife requests. No changes with rash in last 7 years. )    HPI:  Ms. Sourav Mascorro is a(n) 34 year old female who presents today as a new patient for evaluation of possible cutaneous lupus, referred by midwife, Rose Agarwal CNM.    Rash on R forearm for years, seems to worsen in sun. Reportedly prior bx in [place] in 2022 that showed cutaneous lupus. Thinks the rash may have been treated with ketoconazole, but she's not totally sure. Not currently treating. No noticeable rash elsewhere. Endorses general sensitivity to the sun and occasional scalp itch. ROS otherwise negative.     She is ~20w pregnant currently.      Patient is otherwise feeling well, without additional skin concerns.    Labs Reviewed:  Most recent CBC and CMP from 8/2024 reviewed and normal.     Physical Exam:  Vitals: LMP 06/04/2024 (Exact Date)   SKIN: Focused examination of scalp, face, arms, chest was performed.  - Ill-defined faint red patch with prior biopsy site scar on the R forearm.  - Mild flaky scale on the scalp.   -  No other lesions of concern on areas examined.               Medications:  Current Outpatient Medications   Medication Sig Dispense Refill     albuterol (PROAIR HFA/PROVENTIL HFA/VENTOLIN HFA) 108 (90 Base) MCG/ACT inhaler Inhale 2 puffs into the lungs every 4 hours as needed for cough       aspirin 81 MG EC tablet Take 1 tablet (81 mg) by mouth daily for 120 days 30 tablet 3     buPROPion (WELLBUTRIN XL) 300 MG 24 hr tablet Take 1 tablet (300 mg) by mouth every morning. 90 tablet 3     levothyroxine (SYNTHROID) 25 MCG tablet Take 1 tablet (25 mcg) by mouth daily. 90 tablet 3     metroNIDAZOLE (FLAGYL) 500 MG tablet Take 1 tablet (500 mg) by mouth 2 times daily. 14 tablet 0     Prenatal Vit-Fe Fumarate-FA (PRENATAL MULTIVITAMIN  PLUS IRON) 27-1 MG TABS Take 1 tablet by mouth daily       No current facility-administered medications for this visit.      Past Medical History:   Patient Active Problem List   Diagnosis     CARDIOVASCULAR SCREENING; LDL GOAL LESS THAN 160     Cutaneous lupus erythematosus     Major depressive disorder     Breast asymmetry     Autoimmune hypothyroidism     Abnormal Pap smear of cervix     Elevated liver enzymes- Borderline Fatty Liver Disesease     Exercise-induced asthma     Family history of breast cancer     History of eating disorder     Inflammatory dermatosis     Insomnia     Reactive airway disease     At risk for complication of pregnancy     BMI 45.0-49.9, adult (H)     At high risk for venous thromboembolism (VTE)     Supervision of high risk pregnancy, antepartum     Past Medical History:   Diagnosis Date     Depressive disorder     Since age 12, on Wellbutrin since 2016.  No therapist at this time.  No mental health hospitalizations in her past.     Eating disorder      Reactive airway disease 01/30/2023    Exercise-induced, uses albuterol as needed.     Thyroid disease 2022       CC Rose Agarwal, RACHEL  [address]  [place] [address] on close of this  encounter.      Again, thank you for allowing me to participate in the care of your patient.      Sincerely,    Joshua Sheth MD

## 2024-10-22 NOTE — PROGRESS NOTES
I have personally examined this patient and agree with the resident doctor's documentation and plan of care. I have reviewed and amended the resident's note. The documentation accurately reflects my clinical observations, diagnoses, treatment and follow-up plans. I was present for key portions of the procedure(s).       Joshua Sheth MD  Dermatology Staff        [facility] Dermatology Note  Encounter Date: Oct 22, 2024  Office Visit     Dermatology Problem List:  PREGNANT, ~20w gest age as of 10/22/24.   # Ill-defined red patch, R forearm  - Reportedly prior outside bx c/w cutaneous lupus (2022 in [place], no records)  - Punch bx (10/22/24): pending  - RAINE (10/22/24): pending  # Seb derm  - ketoconazole shampoo       ____________________________________________    Assessment & Plan:     # Rash, R forearm.  - Non-specific faint red, ill-defined patch on the R forearm that reportedly worsens with sun exposure. Reportedly a biopsy from outside health system was consistent with cutaneous lupus. On presentation today, low clinical suspicion for cutaneous lupus regarding the rash on the forearm and no rash elsewhere. She has been unable to retrieve records and does desire repeat biopsy for further diagnostic clarification/confirmation, which is certainly reasonable.  The rash is currently not terribly bothersome to her, but she would be interested in treating it pending biopsy results.     # Seborrheic dermatitis, scalp  - Start ketoconazole shampoo.     Procedures Performed:   - Punch biopsy procedure note, location(s): R forearm. After discussion of benefits and risks including but not limited to bleeding, infection, scar, incomplete removal, recurrence, and non-diagnostic biopsy, verbak consent and photographs were obtained. The area was cleaned with isopropyl alcohol. 0.5mL of 1% lidocaine with epinephrine was injected to obtain adequate anesthesia and a 4 mm punch biopsy was performed at site(s).  4-0 Prolene sutures were utilized to approximate the epidermal edges. White petrolatum ointment and a bandage was applied to the wound. Explicit verbal and written wound care instructions were provided. The patient left the dermatology clinic in good condition.     Follow-up: tbd pending results    Staff and Resident:     Resident:  I saw and discussed the patient with the attending physician, Dr. Meryl Long MD, PhD  PGY-4 Dermatology Resident       ____________________________________________    CC: Derm Problem (Sourav is here today for a rash that was biopsied as cutaneous lupus per midwife requests. No changes with rash in last 7 years. )    HPI:  Ms. Sourav Mascorro is a(n) 34 year old female who presents today as a new patient for evaluation of possible cutaneous lupus, referred by midwife, Rose Agarwal CNM.    Rash on R forearm for years, seems to worsen in sun. Reportedly prior bx in [place] in 2022 that showed cutaneous lupus. Thinks the rash may have been treated with ketoconazole, but she's not totally sure. Not currently treating. No noticeable rash elsewhere. Endorses general sensitivity to the sun and occasional scalp itch. ROS otherwise negative.     She is ~20w pregnant currently.      Patient is otherwise feeling well, without additional skin concerns.    Labs Reviewed:  Most recent CBC and CMP from 8/2024 reviewed and normal.     Physical Exam:  Vitals: LMP 06/04/2024 (Exact Date)   SKIN: Focused examination of scalp, face, arms, chest was performed.  - Ill-defined faint red patch with prior biopsy site scar on the R forearm.  - Mild flaky scale on the scalp.   - No other lesions of concern on areas examined.               Medications:  Current Outpatient Medications   Medication Sig Dispense Refill    albuterol (PROAIR HFA/PROVENTIL HFA/VENTOLIN HFA) 108 (90 Base) MCG/ACT inhaler Inhale 2 puffs into the lungs every 4 hours as needed for cough      aspirin 81 MG EC tablet Take  1 tablet (81 mg) by mouth daily for 120 days 30 tablet 3    buPROPion (WELLBUTRIN XL) 300 MG 24 hr tablet Take 1 tablet (300 mg) by mouth every morning. 90 tablet 3    levothyroxine (SYNTHROID) 25 MCG tablet Take 1 tablet (25 mcg) by mouth daily. 90 tablet 3    metroNIDAZOLE (FLAGYL) 500 MG tablet Take 1 tablet (500 mg) by mouth 2 times daily. 14 tablet 0    Prenatal Vit-Fe Fumarate-FA (PRENATAL MULTIVITAMIN  PLUS IRON) 27-1 MG TABS Take 1 tablet by mouth daily       No current facility-administered medications for this visit.      Past Medical History:   Patient Active Problem List   Diagnosis    CARDIOVASCULAR SCREENING; LDL GOAL LESS THAN 160    Cutaneous lupus erythematosus    Major depressive disorder    Breast asymmetry    Autoimmune hypothyroidism    Abnormal Pap smear of cervix    Elevated liver enzymes- Borderline Fatty Liver Disesease    Exercise-induced asthma    Family history of breast cancer    History of eating disorder    Inflammatory dermatosis    Insomnia    Reactive airway disease    At risk for complication of pregnancy    BMI 45.0-49.9, adult (H)    At high risk for venous thromboembolism (VTE)    Supervision of high risk pregnancy, antepartum     Past Medical History:   Diagnosis Date    Depressive disorder     Since age 12, on Wellbutrin since 2016.  No therapist at this time.  No mental health hospitalizations in her past.    Eating disorder     Reactive airway disease 01/30/2023    Exercise-induced, uses albuterol as needed.    Thyroid disease 2022       CC Rose Agarwal, RACHEL  [address]  [place] [address] on close of this encounter.

## 2024-10-22 NOTE — PATIENT INSTRUCTIONS
Wound Care After a Biopsy    What is a skin biopsy?  A skin biopsy allows the doctor to examine a very small piece of tissue under the microscope to determine the diagnosis and the best treatment for the skin condition. A local anesthetic (numbing medicine) is injected with a very small needle into the skin area to be tested. A small piece of skin is taken from the area. Sometimes a suture (stitch) is used.     What are the risks of a skin biopsy?  I will experience scar, bleeding, swelling, pain, crusting and redness. I may experience incomplete removal or recurrence. Risks of this procedure are excessive bleeding, bruising, infection, nerve damage, numbness, thick (hypertrophic or keloidal) scar and non-diagnostic biopsy.    How should I care for my wound for the first 24 hours?  Keep the wound dry and covered for 24 hours  If it bleeds, hold direct pressure on the area for 15 minutes. If bleeding does not stop, call us or go to the emergency room  Avoid strenuous exercise the first 1-2 days or as your doctor instructs you    How should I care for the wound after 24 hours?  After 24 hours, remove the bandage  You may bathe or shower as normal  If you had a scalp biopsy, you can shampoo as usual and can use shower water to clean the biopsy site daily  Clean the wound once a day with gentle soap and water  Do not scrub, be gentle  Apply white petroleum/Vaseline after cleaning the wound with a cotton swab or a clean finger, and keep the site covered with a Bandaid /bandage. Bandages are not necessary with a scalp biopsy  If you are unable to cover the site with a Bandaid /bandage, re-apply ointment 2-3 times a day to keep the site moist. Moisture will help with healing  Avoid strenuous activity for first 1-2 days  Avoid lakes, rivers, pools, and oceans until the stitches are removed or the site is healed    How do I clean my wound?  Wash hands thoroughly with soap or use hand  before all wound care  Clean  the wound with gentle soap and water  Apply white petroleum/Vaseline  to wound after it is clean  Replace the Bandaid /bandage to keep the wound covered for the first few days or as instructed by your doctor  If you had a scalp biopsy, warm shower water to the area on a daily basis should suffice    What should I use to clean my wound?   Cotton-tipped applicators (Qtips )  White petroleum jelly (Vaseline ). Use a clean new container and use Q-tips to apply.  Bandaids  as needed  Gentle soap     How should I care for my wound long term?  Do not get your wound dirty  Keep up with wound care for one week or until the area is healed.  If you have stitches, stitches need to be removed in 14 days. You may return to our clinic for this or you may have it done locally at your doctor s office.  A small scab will form and fall off by itself when the area is completely healed. The area will be red and will become pink in color as it heals. Sun protection is very important for how your scar will turn out. Sunscreen with an SPF 30 or greater is recommended once the area is healed.  You should have some soreness but it should be mild and slowly go away over several days. Talk to your doctor about using tylenol for pain,    When should I call my doctor?  If you have increased:   Pain or swelling  Pus or drainage (clear or slightly yellow drainage is ok)  Temperature over 100F  Spreading redness or warmth around wound    When will I hear about my results?  The biopsy results can take 2 weeks to come back.  Your results will automatically release to [employer] before your provider has even reviewed them.  The clinic will call you with the results, send you a [employer] message, or have you schedule a follow-up clinic or phone time to discuss the results.  Contact our clinics if you do not hear from us in 2 weeks.    Who should I call with questions?  [facility]: [phone]  [facility]  [facility]: [phone]  For urgent needs outside of business hours call the [facility] at [phone] and ask for the dermatology resident on call

## 2024-10-22 NOTE — NURSING NOTE
Lidocaine-epinephrine 1-1:659601 % injection   1 mL once for one use, starting 10/22/2024 ending 10/22/2024,  2mL disp, R-0, injection  Injected by Dr. Long

## 2024-10-23 ENCOUNTER — PRENATAL OFFICE VISIT (OUTPATIENT)
Dept: MIDWIFE SERVICES | Facility: CLINIC | Age: 34
End: 2024-10-23
Payer: COMMERCIAL

## 2024-10-23 VITALS
BODY MASS INDEX: 44.4 KG/M2 | DIASTOLIC BLOOD PRESSURE: 65 MMHG | OXYGEN SATURATION: 99 % | HEART RATE: 85 BPM | SYSTOLIC BLOOD PRESSURE: 112 MMHG | WEIGHT: 273 LBS

## 2024-10-23 DIAGNOSIS — O26.619 HEPATIC STEATOSIS DURING PREGNANCY: ICD-10-CM

## 2024-10-23 DIAGNOSIS — K76.0 HEPATIC STEATOSIS DURING PREGNANCY: ICD-10-CM

## 2024-10-23 DIAGNOSIS — O09.90 SUPERVISION OF HIGH RISK PREGNANCY, ANTEPARTUM: ICD-10-CM

## 2024-10-23 DIAGNOSIS — R74.8 ELEVATED LIVER ENZYMES: ICD-10-CM

## 2024-10-23 DIAGNOSIS — L93.2 CUTANEOUS LUPUS ERYTHEMATOSUS: ICD-10-CM

## 2024-10-23 DIAGNOSIS — R21 RASH: ICD-10-CM

## 2024-10-23 DIAGNOSIS — Z91.89 AT HIGH RISK FOR VENOUS THROMBOEMBOLISM (VTE): ICD-10-CM

## 2024-10-23 DIAGNOSIS — R79.89 ELEVATED LFTS: Primary | ICD-10-CM

## 2024-10-23 LAB
ALBUMIN SERPL BCG-MCNC: 3.7 G/DL (ref 3.5–5.2)
ALP SERPL-CCNC: 85 U/L (ref 40–150)
ALT SERPL W P-5'-P-CCNC: 32 U/L (ref 0–50)
ANION GAP SERPL CALCULATED.3IONS-SCNC: 11 MMOL/L (ref 7–15)
AST SERPL W P-5'-P-CCNC: 23 U/L (ref 0–45)
BILIRUB SERPL-MCNC: 0.2 MG/DL
BUN SERPL-MCNC: 8.1 MG/DL (ref 6–20)
CALCIUM SERPL-MCNC: 9.2 MG/DL (ref 8.8–10.4)
CHLORIDE SERPL-SCNC: 104 MMOL/L (ref 98–107)
CREAT SERPL-MCNC: 0.64 MG/DL (ref 0.51–0.95)
EGFRCR SERPLBLD CKD-EPI 2021: >90 ML/MIN/1.73M2
GLUCOSE SERPL-MCNC: 98 MG/DL (ref 70–99)
HCO3 SERPL-SCNC: 22 MMOL/L (ref 22–29)
POTASSIUM SERPL-SCNC: 3.9 MMOL/L (ref 3.4–5.3)
PROT SERPL-MCNC: 6.4 G/DL (ref 6.4–8.3)
SODIUM SERPL-SCNC: 137 MMOL/L (ref 135–145)

## 2024-10-23 PROCEDURE — 99207 PR PRENATAL VISIT: CPT | Performed by: MIDWIFE

## 2024-10-23 PROCEDURE — 90480 ADMN SARSCOV2 VAC 1/ONLY CMP: CPT | Performed by: MIDWIFE

## 2024-10-23 PROCEDURE — 36415 COLL VENOUS BLD VENIPUNCTURE: CPT | Performed by: MIDWIFE

## 2024-10-23 PROCEDURE — 99000 SPECIMEN HANDLING OFFICE-LAB: CPT | Performed by: MIDWIFE

## 2024-10-23 PROCEDURE — 82105 ALPHA-FETOPROTEIN SERUM: CPT | Mod: 90 | Performed by: MIDWIFE

## 2024-10-23 PROCEDURE — 86038 ANTINUCLEAR ANTIBODIES: CPT | Performed by: MIDWIFE

## 2024-10-23 PROCEDURE — 91320 SARSCV2 VAC 30MCG TRS-SUC IM: CPT | Performed by: MIDWIFE

## 2024-10-23 PROCEDURE — 80053 COMPREHEN METABOLIC PANEL: CPT | Performed by: MIDWIFE

## 2024-10-23 NOTE — PROGRESS NOTES
Sourav presents to [facility] clinic for a routine prenatal visit at 20w1d. Feeling well. No more nausea.  MFM anatomy scan reviewed.  (Followed by MFM for BMI  48, fatty liver, cutaneous lupus?)  Derm yesterday--results not in yet  Monthly LFTs (today),and desires AFP as well today  Last seen 9/16/2024--reminded patient that although she has many appointments, she should make her CNM appointments at least every 4 weeks.  Recently seen 10/16/2024 for dysuria and pelvic pain--positive for BV, Rx Flagyl, patient taking, 2 pills left.  Pregnancy discomforts include: tired only  Sleeping: ok, unisom every night  Fetal movement: yes! Just recently and in middle of night  Patient desires COVID vaccination today  Mid pregnancy anticipatory guidance reviewed.   Discussed 1 hr glucose instructions for NV    Enc follow-up in 4weeks or sooner prn.

## 2024-10-24 LAB
ANA SER QL IF: NEGATIVE
PATH REPORT.COMMENTS IMP SPEC: NORMAL
PATH REPORT.FINAL DX SPEC: NORMAL
PATH REPORT.GROSS SPEC: NORMAL
PATH REPORT.MICROSCOPIC SPEC OTHER STN: NORMAL
PATH REPORT.RELEVANT HX SPEC: NORMAL

## 2024-10-25 LAB
# FETUSES US: NORMAL
AFP MOM SERPL: 1.18
AFP SERPL-MCNC: 48 NG/ML
AGE - REPORTED: 34.6 YR
CURRENT SMOKER: NO
FAMILY MEMBER DISEASES HX: NO
GA METHOD: NORMAL
GA: NORMAL WK
IDDM PATIENT QL: NO
INTEGRATED SCN PATIENT-IMP: NORMAL
SPECIMEN DRAWN SERPL: NORMAL

## 2024-11-01 ENCOUNTER — OFFICE VISIT (OUTPATIENT)
Dept: MATERNAL FETAL MEDICINE | Facility: HOSPITAL | Age: 34
End: 2024-11-01
Attending: OBSTETRICS & GYNECOLOGY
Payer: COMMERCIAL

## 2024-11-01 ENCOUNTER — ANCILLARY PROCEDURE (OUTPATIENT)
Dept: ULTRASOUND IMAGING | Facility: HOSPITAL | Age: 34
End: 2024-11-01
Attending: OBSTETRICS & GYNECOLOGY
Payer: COMMERCIAL

## 2024-11-01 DIAGNOSIS — O26.612 LIVER DISORDER DURING PREGNANCY IN SECOND TRIMESTER: Primary | ICD-10-CM

## 2024-11-01 DIAGNOSIS — O99.210 OBESITY DURING PREGNANCY: ICD-10-CM

## 2024-11-01 PROCEDURE — 76816 OB US FOLLOW-UP PER FETUS: CPT

## 2024-11-01 PROCEDURE — 99207 PR NO CHARGE LOS: CPT | Performed by: OBSTETRICS & GYNECOLOGY

## 2024-11-01 PROCEDURE — 76816 OB US FOLLOW-UP PER FETUS: CPT | Mod: 26 | Performed by: OBSTETRICS & GYNECOLOGY

## 2024-11-01 NOTE — NURSING NOTE
Sourav Mascorro is a  at 21w3d who presents to [facility] for follow up growth ultrasound. Pt reports positive fetal movement. Pt denies bldg/lof/change in discharge, contractions, headache, vision changes, chest pain/SOB or edema. SBAR given to Dr. Marie, see note in Epic.

## 2024-11-01 NOTE — PROGRESS NOTES
Please see the imaging tab for details of the ultrasound performed today.    Marci Marie MD  Specialist in Maternal-Fetal Medicine

## 2024-11-02 PROBLEM — L93.2 CUTANEOUS LUPUS ERYTHEMATOSUS: Status: RESOLVED | Noted: 2024-07-17 | Resolved: 2024-11-02

## 2024-11-05 ENCOUNTER — DOCUMENTATION ONLY (OUTPATIENT)
Dept: MIDWIFE SERVICES | Facility: CLINIC | Age: 34
End: 2024-11-05
Payer: COMMERCIAL

## 2024-11-18 ENCOUNTER — DOCUMENTATION ONLY (OUTPATIENT)
Dept: OTHER | Facility: CLINIC | Age: 34
End: 2024-11-18
Payer: COMMERCIAL

## 2024-11-19 NOTE — PROGRESS NOTES
Sourav presents to [facility] clinic here with David, for a routine prenatal visit at 24w1d. Feeling well.  On HRL for fatty liver and BMI 48  Here for her 1 hr. GCT, hgb today.   Reviewed LFT's and AFP testing from last visit--all WNL.  MFM US WNL, NV with MFM 24 for growth US.  Reports normal fetal movements. Discussed [facility]. Denies PTL including, regular painful contractions, bleeding, leaking or changes in fetal movement.   Reviewed  labor precautions, warning signs and when/how to call the on-call CNM.  BV s/s cleared--yes, all fine.     Questions about: nausea/vomiting still--occasional in the middle of a meal. Sometimes with no warning.  Today LFT's added.  Reviewed  labor precautions, warning signs and when/how to call the on-call CNM.   Reviewed recommendation for Tdap for fetal protection of pertussis, given after 27w0d.     NV accepts Tdap for fetal protection of pertussis 27-36 wks.   Rhogam NV  NV: 4 wks if labs are normal

## 2024-11-20 ENCOUNTER — PRENATAL OFFICE VISIT (OUTPATIENT)
Dept: MIDWIFE SERVICES | Facility: CLINIC | Age: 34
End: 2024-11-20
Payer: COMMERCIAL

## 2024-11-20 VITALS
DIASTOLIC BLOOD PRESSURE: 78 MMHG | BODY MASS INDEX: 43.71 KG/M2 | HEIGHT: 66 IN | OXYGEN SATURATION: 99 % | SYSTOLIC BLOOD PRESSURE: 124 MMHG | HEART RATE: 91 BPM | WEIGHT: 272 LBS

## 2024-11-20 DIAGNOSIS — O09.90 SUPERVISION OF HIGH RISK PREGNANCY, ANTEPARTUM: ICD-10-CM

## 2024-11-20 DIAGNOSIS — Z91.89 AT HIGH RISK FOR VENOUS THROMBOEMBOLISM (VTE): ICD-10-CM

## 2024-11-20 DIAGNOSIS — O99.810 GLUCOSE INTOLERANCE OF PREGNANCY: Primary | ICD-10-CM

## 2024-11-20 LAB
GLUCOSE 1H P 50 G GLC PO SERPL-MCNC: 140 MG/DL (ref 70–129)
HGB BLD-MCNC: 11.2 G/DL (ref 11.7–15.7)

## 2024-11-20 PROCEDURE — 36415 COLL VENOUS BLD VENIPUNCTURE: CPT | Performed by: MIDWIFE

## 2024-11-20 PROCEDURE — 84460 ALANINE AMINO (ALT) (SGPT): CPT | Performed by: MIDWIFE

## 2024-11-20 PROCEDURE — 85018 HEMOGLOBIN: CPT | Performed by: MIDWIFE

## 2024-11-20 PROCEDURE — 99207 PR PRENATAL VISIT: CPT | Performed by: MIDWIFE

## 2024-11-20 PROCEDURE — 82950 GLUCOSE TEST: CPT | Performed by: MIDWIFE

## 2024-11-20 PROCEDURE — 84450 TRANSFERASE (AST) (SGOT): CPT | Performed by: MIDWIFE

## 2024-11-21 LAB
ALT SERPL W P-5'-P-CCNC: 23 U/L (ref 0–50)
AST SERPL W P-5'-P-CCNC: 19 U/L (ref 0–45)

## 2024-11-27 ENCOUNTER — LAB (OUTPATIENT)
Dept: LAB | Facility: CLINIC | Age: 34
End: 2024-11-27
Payer: COMMERCIAL

## 2024-11-27 DIAGNOSIS — O99.810 GLUCOSE INTOLERANCE OF PREGNANCY: ICD-10-CM

## 2024-11-27 LAB
GESTATIONAL GTT 1 HR POST DOSE: 118 MG/DL (ref 60–179)
GESTATIONAL GTT 2 HR POST DOSE: 137 MG/DL (ref 60–154)
GESTATIONAL GTT 3 HR POST DOSE: 74 MG/DL (ref 60–139)
GLUCOSE P FAST SERPL-MCNC: 95 MG/DL (ref 60–94)

## 2024-11-27 PROCEDURE — 36415 COLL VENOUS BLD VENIPUNCTURE: CPT

## 2024-11-27 PROCEDURE — 82951 GLUCOSE TOLERANCE TEST (GTT): CPT

## 2024-11-27 PROCEDURE — 82952 GTT-ADDED SAMPLES: CPT

## 2024-12-15 NOTE — PROGRESS NOTES
Sourav presents to [facility] clinic for a routine prenatal visit with David at 28w1d. Feeling well.   LFT's, Antibody screen and Rhogam today.  Had MFM visit today growth US.  Accepts Tdap today for fetal protection of pertussis.   Reports normal fetal movements. Discussed [facility]. Denies PTL including, regular painful contractions, bleeding, leaking or changes in fetal movement.   Reviewed  labor precautions, warning signs and when/how to call the on-call CNM.   Patient states that she has lost 16 pounds and is that okay?  We reviewed her IOB elevated BMI of 48 and recommended no weight gain.  Discussed that we do not recommend weight loss however sometimes this happens when people change their diet dramatically and have a much healthier diet with protein and vegetables and watching their carbohydrate intake.  Discussed that this is also why we have maternal-fetal medicine following her and doing growth ultrasounds every 4 weeks.  Also discussed that she had an elevated 1 hour glucose test and 1 elevated level and her 3-hour GTT.  This suggests that she has some alteration in her glucose metabolism.  She does not have a diagnosis of gestational diabetes but she will have to watch carefully to make sure that she does not have elevated blood sugars on a regular basis.  Recommended small frequent meals heavy on protein and fresh vegetables and cooked vegetables and healthy fats.  Some carbohydrates fruit and fiber.    NV: plan RTC in 4 wks if labs normal

## 2024-12-18 ENCOUNTER — PRENATAL OFFICE VISIT (OUTPATIENT)
Dept: MIDWIFE SERVICES | Facility: CLINIC | Age: 34
End: 2024-12-18
Payer: COMMERCIAL

## 2024-12-18 ENCOUNTER — ANCILLARY PROCEDURE (OUTPATIENT)
Dept: ULTRASOUND IMAGING | Facility: HOSPITAL | Age: 34
End: 2024-12-18
Attending: OBSTETRICS & GYNECOLOGY
Payer: COMMERCIAL

## 2024-12-18 ENCOUNTER — OFFICE VISIT (OUTPATIENT)
Dept: MATERNAL FETAL MEDICINE | Facility: HOSPITAL | Age: 34
End: 2024-12-18
Attending: OBSTETRICS & GYNECOLOGY
Payer: COMMERCIAL

## 2024-12-18 ENCOUNTER — MYC MEDICAL ADVICE (OUTPATIENT)
Dept: FAMILY MEDICINE | Facility: CLINIC | Age: 34
End: 2024-12-18

## 2024-12-18 VITALS
WEIGHT: 274 LBS | OXYGEN SATURATION: 98 % | HEART RATE: 85 BPM | DIASTOLIC BLOOD PRESSURE: 76 MMHG | SYSTOLIC BLOOD PRESSURE: 120 MMHG | BODY MASS INDEX: 44.56 KG/M2

## 2024-12-18 DIAGNOSIS — L93.2 CUTANEOUS LUPUS ERYTHEMATOSUS: ICD-10-CM

## 2024-12-18 DIAGNOSIS — O26.612 LIVER DISORDER DURING PREGNANCY IN SECOND TRIMESTER: ICD-10-CM

## 2024-12-18 DIAGNOSIS — O99.210 OBESITY DURING PREGNANCY: ICD-10-CM

## 2024-12-18 DIAGNOSIS — O26.613 LIVER DISORDER DURING PREGNANCY IN THIRD TRIMESTER: ICD-10-CM

## 2024-12-18 DIAGNOSIS — Z91.89 AT HIGH RISK FOR VENOUS THROMBOEMBOLISM (VTE): ICD-10-CM

## 2024-12-18 DIAGNOSIS — O09.90 SUPERVISION OF HIGH RISK PREGNANCY, ANTEPARTUM: ICD-10-CM

## 2024-12-18 DIAGNOSIS — O99.213 OBESITY DURING PREGNANCY IN THIRD TRIMESTER: Primary | ICD-10-CM

## 2024-12-18 LAB
ALBUMIN SERPL BCG-MCNC: 3.6 G/DL (ref 3.5–5.2)
ALP SERPL-CCNC: 104 U/L (ref 40–150)
ALT SERPL W P-5'-P-CCNC: 32 U/L (ref 0–50)
ANION GAP SERPL CALCULATED.3IONS-SCNC: 10 MMOL/L (ref 7–15)
AST SERPL W P-5'-P-CCNC: 20 U/L (ref 0–45)
BILIRUB SERPL-MCNC: 0.2 MG/DL
BLD GP AB SCN SERPL QL: NEGATIVE
BUN SERPL-MCNC: 5.5 MG/DL (ref 6–20)
CALCIUM SERPL-MCNC: 9.4 MG/DL (ref 8.8–10.4)
CHLORIDE SERPL-SCNC: 104 MMOL/L (ref 98–107)
CREAT SERPL-MCNC: 0.88 MG/DL (ref 0.51–0.95)
EGFRCR SERPLBLD CKD-EPI 2021: 88 ML/MIN/1.73M2
GLUCOSE SERPL-MCNC: 90 MG/DL (ref 70–99)
HCO3 SERPL-SCNC: 24 MMOL/L (ref 22–29)
POTASSIUM SERPL-SCNC: 3.9 MMOL/L (ref 3.4–5.3)
PROT SERPL-MCNC: 6.2 G/DL (ref 6.4–8.3)
SODIUM SERPL-SCNC: 138 MMOL/L (ref 135–145)
SPECIMEN EXP DATE BLD: NORMAL
T PALLIDUM AB SER QL: NONREACTIVE

## 2024-12-18 PROCEDURE — 86850 RBC ANTIBODY SCREEN: CPT | Performed by: MIDWIFE

## 2024-12-18 PROCEDURE — 86780 TREPONEMA PALLIDUM: CPT | Performed by: MIDWIFE

## 2024-12-18 PROCEDURE — 36415 COLL VENOUS BLD VENIPUNCTURE: CPT | Performed by: MIDWIFE

## 2024-12-18 PROCEDURE — 80053 COMPREHEN METABOLIC PANEL: CPT | Performed by: MIDWIFE

## 2024-12-18 PROCEDURE — 99207 PR NO CHARGE LOS: CPT | Performed by: OBSTETRICS & GYNECOLOGY

## 2024-12-18 PROCEDURE — 76816 OB US FOLLOW-UP PER FETUS: CPT

## 2024-12-18 PROCEDURE — 76816 OB US FOLLOW-UP PER FETUS: CPT | Mod: 26 | Performed by: OBSTETRICS & GYNECOLOGY

## 2024-12-18 NOTE — NURSING NOTE
Sourav Mascorro is a  at 28w1d who presents to [facility] for a follow-up ultrasound. Pt reports positive fetal movement. Pt denies bldg/lof/change in discharge, contractions, headache, vision changes, chest pain/SOB or edema. SBAR given to Dr. Abarca, see note in Epic.      Cierra Deluca RN

## 2024-12-18 NOTE — NURSING NOTE
Prior to immunization administration, verified patients identity using patient s name and date of birth. Please see Immunization Activity for additional information.     Screening Questionnaire for Adult Immunization    Are you sick today?   No   Do you have allergies to medications, food, a vaccine component or latex?   No   Have you ever had a serious reaction after receiving a vaccination?   No   Do you have a long-term health problem with heart, lung, kidney, or metabolic disease (e.g., diabetes), asthma, a blood disorder, no spleen, complement component deficiency, a cochlear implant, or a spinal fluid leak?  Are you on long-term aspirin therapy?   No   Do you have cancer, leukemia, HIV/AIDS, or any other immune system problem?   No   Do you have a parent, brother, or sister with an immune system problem?   No   In the past 3 months, have you taken medications that affect  your immune system, such as prednisone, other steroids, or anticancer drugs; drugs for the treatment of rheumatoid arthritis, Crohn s disease, or psoriasis; or have you had radiation treatments?   No   Have you had a seizure, or a brain or other nervous system problem?   No   During the past year, have you received a transfusion of blood or blood    products, or been given immune (gamma) globulin or antiviral drug?   No   For women: Are you pregnant or is there a chance you could become       pregnant during the next month?   Yes   Have you received any vaccinations in the past 4 weeks?   No     Immunization questionnaire was positive for at least one answer.  Notified Edilia Marmolejo CNM.      Patient instructed to remain in clinic for 15 minutes afterwards, and to report any adverse reactions.     Screening performed by Zari Cuevas LPN on 12/18/2024 at 10:21 AM.      Clinic Administered Medication Documentation        Patient was given Rhophlyac. Prior to medication administration, verified patient's identity using patient s name  and date of birth. Please see MAR and medication order for additional information. Patient instructed to remain in clinic for 15 minutes and report any adverse reaction to staff immediately.    Vial/Syringe: Syringe

## 2024-12-18 NOTE — PROGRESS NOTES
"Please see \"Imaging\" tab under \"Chart Review\" for details of today's visit.    Priyanka Abarca    "

## 2024-12-19 ENCOUNTER — VIRTUAL VISIT (OUTPATIENT)
Dept: FAMILY MEDICINE | Facility: CLINIC | Age: 34
End: 2024-12-19
Payer: COMMERCIAL

## 2024-12-19 ENCOUNTER — TELEPHONE (OUTPATIENT)
Dept: FAMILY MEDICINE | Facility: CLINIC | Age: 34
End: 2024-12-19

## 2024-12-19 ENCOUNTER — MYC MEDICAL ADVICE (OUTPATIENT)
Dept: FAMILY MEDICINE | Facility: CLINIC | Age: 34
End: 2024-12-19

## 2024-12-19 ASSESSMENT — PATIENT HEALTH QUESTIONNAIRE - PHQ9
SUM OF ALL RESPONSES TO PHQ QUESTIONS 1-9: 6
10. IF YOU CHECKED OFF ANY PROBLEMS, HOW DIFFICULT HAVE THESE PROBLEMS MADE IT FOR YOU TO DO YOUR WORK, TAKE CARE OF THINGS AT HOME, OR GET ALONG WITH OTHER PEOPLE: SOMEWHAT DIFFICULT
SUM OF ALL RESPONSES TO PHQ QUESTIONS 1-9: 6

## 2024-12-19 NOTE — PROGRESS NOTES
Sourav is a 34 year old who is being evaluated via a billable video visit.    How would you like to obtain your AVS? [employer]  If the video visit is dropped, the invitation should be resent by: Text to cell phone: [phone]  Will anyone else be joining your video visit? No  {If patient encounters technical issues they should call [phone] :748047}    {PROVIDER CHARTING PREFERENCE:212914}    Subjective   Sourav is a 34 year old, presenting for the following health issues:  Letter Request (For emotional support animal, has dog and a cat)      12/19/2024    10:25 AM   Additional Questions   Roomed by rod   Accompanied by self         12/19/2024    10:25 AM   Patient Reported Additional Medications   Patient reports taking the following new medications see chart     Cat (Kermit) [place] domestic shorthair tabby weight 8lbs  Dog (David) Mixed breed siberian husky weighs about 70 pounds    Landlord    Has depression and helps get through tough days when symptoms are bad. Pregnant and feeling strong motherly instinct and pets offer support for her.     Has had them (David 3 years) Kermit 2 years.    Current lives with mom and trying to get an apartment before baby comes.     No gender baby is middle weight.   Being on levothyroxine helped liver and feeling better  Wellbutrin still working well for her.     Sign and upload to [employer].     History of Present Illness       Reason for visit:  Emotional support animal paperwork   She is taking medications regularly.       {SUPERLIST (Optional):239576}  {additonal problems for provider to add (Optional):640219}    {ROS Picklists (Optional):258084}      Objective           Vitals:  No vitals were obtained today due to virtual visit.    Physical Exam   {video visit exam brief selected:366100}    {Diagnostic Test Results (Optional):335267}      Video-Visit Details    Type of service:  Video Visit   Originating Location (pt. Location): {video visit patient  "location:919653::\"Home\"}  {PROVIDER LOCATION On-site should be selected for visits conducted from your clinic location or adjoining [facility] hospital, academic office, or other nearby [facility] building. Off-site should be selected for all other provider locations, including home:157501}  Distant Location (provider location):  {virtual location provider:518798}  Platform used for Video Visit: {Virtual Visit Platforms:381813::\"Vee24\"}  Signed Electronically by: NATASHA FRANCO PA-C  {Email feedback regarding this note to [email or web address]   :733159}  "

## 2024-12-19 NOTE — LETTER
12/19/2024      Sourav Mascorro  [address] [place] [id number]      To Whom it May Concern:    Sourav Mascorro is under my medical care and has requested a letter of verification for maintaining her pets as her emotional support animals. She suffers from anxiety and depression. She is being treated for this medically. However, her animals have been in her family and have offered emotional support in her time of need. They assist her in getting through the tough times and I feel her emotional health would suffer if she were not able to keep them in her care.     She has a grey domestic short-hair tabby cat named Kermit. He weighs about 8 pounds.   She has a mixed breed Siberian husky named David. He weighs about 70 lbs.     She is aware that she is responsible to provide insurance coverage for any damages to the residence or harm to other people as a result of these animals. She is also aware that both animals need to be current with vet checks and vaccinations.     Sincerely,        Otilia Wilson PA-C

## 2025-01-05 ENCOUNTER — MYC REFILL (OUTPATIENT)
Dept: FAMILY MEDICINE | Facility: CLINIC | Age: 35
End: 2025-01-05
Payer: COMMERCIAL

## 2025-01-05 DIAGNOSIS — F32.9 MAJOR DEPRESSIVE DISORDER WITH CURRENT ACTIVE EPISODE, UNSPECIFIED DEPRESSION EPISODE SEVERITY, UNSPECIFIED WHETHER RECURRENT: ICD-10-CM

## 2025-01-05 DIAGNOSIS — E03.8 SUBCLINICAL HYPOTHYROIDISM: ICD-10-CM

## 2025-01-05 DIAGNOSIS — Z33.1 PREGNANCY, INCIDENTAL: ICD-10-CM

## 2025-01-06 RX ORDER — LEVOTHYROXINE SODIUM 25 UG/1
25 TABLET ORAL DAILY
Qty: 90 TABLET | Refills: 3 | OUTPATIENT
Start: 2025-01-06

## 2025-01-06 RX ORDER — BUPROPION HYDROCHLORIDE 300 MG/1
300 TABLET ORAL EVERY MORNING
Qty: 90 TABLET | Refills: 3 | OUTPATIENT
Start: 2025-01-06

## 2025-01-12 NOTE — PATIENT INSTRUCTIONS
"\"We hope you had a positive experience and that you can definitely recommend [facility] to your family and friends. You ll be receiving a survey soon and we look forward to hearing your feedback\".    [facility]  - Contact information:  Appointment line and to get a hold of CNM in clinic Monday-Friday 8 am - 5 pm:  [phone].  There are some clinics with early start times (1st appointment 7:40 am) and others with evening hours (last appointment 6:20 pm).  Most are typically open from 8 am to 5 pm.    CNM on call answering service: [phone].  Specify your hospital of choice and leave a brief message for CNM;  will then page CNM who is on call at your specified hospital and you should receive a call back with 15 minutes.  Be sure that your ringer is audible and that you can accept blocked calls so that we can get back in touch with you! This number should be reserved for urgent needs if during the day, before 8 am, after 5 pm, weekends, holidays.    Contact the on-call CNM with warning signs, such as:  vaginal bleeding   Vaginal discharge and itching or pain and burning during urination  Leg/calf pain or swelling on one side  severe abdominal pain  nausea and vomiting more than 4-5 times a day, or if you are unable to keep anything down  fever more than 100.4 degrees F.     ZENTICKEThart  After each of your visits you are welcome to check ViewsIQ for your visit summary including education and links to information relevant to your pregnancy and/or well woman care.   Find the \"Visits\" tab at the top of the page, you will see a list of recent visits and for each visit a for link for \"View After Visit Summary.\" View of your After Visit Summary will allow you to read our recommendations from your visit, review any education provided, and link to websites with useful information.   If you have any questions or difficulty navigating Dopplr, please feel free to " "contact us and we will do our best to direct you.  Meet the Midwives from [facility]  You are invited to an informational meet and greet with [facility] Certified Nurse-Midwives. Our free \"Meet the Midwives\" event is a great opportunity to learn about our midwives' philosophy and experience, the hospitals where we can assist with your birth, and answer questions you may have. Partners, friends, and family are welcome to attend. Currently, this is a virtual event.  Date  Last Thursday of every month at 7 pm.    Link to next (live) meeting  https://[facility] [email or web address].[email or web address]/meet-the-midwives  To Join by Telephone (audio only) Call:   [phone] Phone Conference ID: [phone] #      Touring the Maternity Care Center  At this time we are offering a virtual tour of the Maternity Care Centers at both [facility] Home and [facility]:   [facility] Maternity Care:   https://[facility].[employer]/locations/the-birthplace-at--[facility] Maternity Care:   https://Edi.ioMychebao.com.[employer]/locations/[facility]    Scroll to the bottom of the page in this link if the above link does not work.      Breastfeeding and Birth Control  How do I decide what birth control method is best for me while I am breastfeeding?  Choosing a method of birth control is very personal. First, answer the following questions:     Do you want to have more children?   How much spacing between births do you want for your children?   Do you smoke or have you had any health problems, such as liver disease or a blood clot?  Talk about the answers to each of these questions with your health care provider to help you choose the best method for you.    Can I use breastfeeding as my birth control?  Using breastfeeding as your birth control (the lactational amenorrhea method) can be a good way to keep from getting pregnant in the first " months after the baby is born. Each time your baby nurses, your body releases a hormone called prolactin, which stops your body from making the hormones that cause you to ovulate (release an egg). If you are not ovulating, you cannot get pregnant.    The lactational amenorrhea method works only if:   you have not started your period yet.   you are breastfeeding only and not giving your baby any other food or drink.   you are breastfeeding at least every 4 hours during the day and every 6 hours at night.   your baby is less than 6 months old.  When any 1 of these 4 things is not happening, you no longer have good protection from getting pregnant, and you should use another form of birth control.    What birth control methods are safe for me to use while I breastfeed?    Methods without hormones  Methods without hormones do not affect you, your baby, or your breastfeeding.  Methods without hormones that are the most effective   The copper intrauterine contraceptive device (IUD) (ParaGard) is a small, T-shaped device that is in- serted into your uterus (womb) through the vagina and cervix. The copper IUD lasts for 10 years.   Sterilization (getting your tubes tied or your partner having a vasectomy) is very effective, but it is per- manent. You should choose sterilization only if you do not want to have more children.  A method without hormones that is effective   The lactational amenorrhea method described above is effective for the first 6 months.  Methods without hormones that are less effective   Natural family planning is monitoring your body for signs of ovulation and not having sex when you think you are ovulating. This method is reliable only if you are having regular periods every month.   Barrier methods (condoms, diaphragms, sponges, and spermicides) are used at the time you have sex. These methods are effective only if you use them correctly every time.    Methods with hormones  Birth control methods that  use hormones can be used while you are breastfeeding. They may have a small effect on lowering the amount of milk you make. All hormones will get into your breast milk in very small amounts, but there is no known harm to your baby from this small amount of hormone in breast milk.only methodsmethods use only 1 hormone, called progestin. You can start them right after your baby is born or wait 4 to 6 weeks to make sure your milk supply is good.   Progestin-only pills ( minipills ): If you like to take pills every day, you can use the minipill. In order forpill to work well, you have to take 1 at the same time each day. When you stop breastfeeding, you should start pills that have both estrogen and progestin because they are better at keeping you from get- ting pregnant.   Progestin IUD (Mirena): The progestin IUD is shaped and inserted into the uterus like the copper IUD. It works for up to 5 years. Both IUDs are usually inserted 4 to 6 weeks after the baby is born.  Progestin implant (Nexplanon): The progestin implant is a small matchstick-sized flexible yasmine. It is placed into the fatty tissue in the back of your arm. It works for up to 3 years.  Progestin shot (Depo-Provera): The progestin shot is given every 3 months.    estrogen and progestin methods  These methods use 2 hormones, called estrogen and progestin.     These methods increase your risk of a blood clot, which is already higher than normal after you have a baby. You should not use them until your baby is at least 6 weeks old. The combined methods are not recommended as the first choice for women who are breastfeeding. If a combined method is the one that you feel will be best for you to prevent getting pregnant, these methods are okay to use while breastfeeding.   Combined birth control pills: You take a pill each day.  Vaginal ring (NuvaRing): The ring is worn in the vagina for 3 weeks then left out for 1 week before youin a new ring.  Patch (Ortho  Yasmany): The patch is placed on your skin and changed every week for 3 weeks then left off forweek before putting a new patch on a different area of your skin.    Pediatric Care Providers at [facility]:    Choosing the right provider is one of the most important decisions you ll make about your health care. We can help you find the right one. Remember, you re looking for a provider you can trust and work with to improve your health and well-being, so take time to think about what you need. Depending on how complicated your health care needs are, you may need to see more than one type of provider.    Primary Care Providers: You ll see a primary care provider first for most health issues. They ll work with you to get your recommended screenings, help you manage chronic conditions, and refer you to other types of providers if you need them. Your primary care provider may be called a family physician or doctor, internist, general practitioner, nurse practitioner, or physician s assistant. Your child or teenager s provider may be called a pediatrician.  Specialists: You ll see a specialist for certain services or to treat specific conditions. Specialists include cardiologists, oncologists, psychologists, allergists, podiatrists, and orthopedists. You may need a referral from your primary care provider before you go to a specialist in order for your health plan to pay for your visit.\[place] are some tips for finding a provider where you live:  If you already have a provider you like and want to keep working with, call their office and ask if they accept your coverage.  Call your insurance company or state Medicaid and CHIP program. Look at their website or check your member handbook to find providers in your network who take your health coverage.  Ask your friends or family if they have providers they like and use these tools to compare health care providers in your area.    Family Medicine at  [facility]:      https://www.[place].org/specialties/family-medicine    Many of our families enjoy all seeing the same doctor, who comes to know the whole family very well. We base our practice on the knowledge of the patient in the context of family and community.  WHY CHOOSE A FAMILY MEDICINE PHYSICIAN?  Ability of the whole family to see the same doctor  Focus on the whole person, including physical and emotional health  A personal relationship with their doctor that is nurtured over time  Respect for individual and family beliefs and values  No need to change primary care providers when a certain age is reached  Coordination of care when other health care services are needed    Pediatrics at  [facility]:   https://www.[place].org/specialties/pediatric-care    Through a teaching affiliation with the [facility], [facility] staff keeps current on new developments in the field of pediatrics.     Everything we do centers around caring for children. We place special emphasis on wellness and prevention.pediatric care team includes a team of pediatricians and certified nurse practitioners who provide care to pediatric and adolescent patients ages 0 to 18, and some up to the age of 26. We offer preventive health maintenance for healthy children as well the diagnosis and treatment of common and chronic illnesses and injuries. In addition, we also offer several pediatric specialists who focus on adolescent health issues and developmental and behavioral issues.    Circumcision    Educational video:     [email or web address]    For More Information  American Academy of Family Physicians: Circumcision  http://familydoctor.org/familydoctor/en/pregnancy-newborns/caring-for-newborns/infant- care/circumcision.html  MedlinePlus: Circumcision (includes a slide show on the procedure)  www.nlm.nih.gov/medlineplus/circumcision.html  American Academy of Pediatrics:  Policy statement on circumcision  Http://pediatrics.aappublications.org/content/130/3/e756.abstract      Childbirth and Parenting Education:     Everyday Miracles:   https://www.everyday-miracles.org/    Free Video Series from [facility]: https://nursing.[facility]/academics/specialty-areas/nurse-midwifery/having-baby-prenatal-videos/having-baby-prenatal-and    Childbirth Education virtual (live) classes: www.Financial Guard/classes  The Birth Hour: https://Deadstock Network/online-childbirth-class/  BirthED: https://www.birthedmn.com/  JOSSY parenting center: http://Perle[employer] Sciences/   (124) 362-XTWZ  Blooma: (education, yoga & wellness) www.TripChamp  Enlightened Mama: www.enlightenedmama.LoanHero   Childbirth collective: (Parent topic nights)  www.childbirthcollective.org/  Hypnobabies:  www.hypnobabiestReally Simple.LoanHero/  Hypnobirthing:  Http://Aventones.LoanHero/  Hypnobirthing virtual class: www.Pipeline/hypnobirthing    Information about doulas:  Childbirth collective: http://www.childbirthcollective.org/  Doulas of [place] (DONNIE):  www.donnie.org  [facility]  project: http://twincitiesdoulaproject.com/     Early Childhood and Family Education (ECFE):  ECFE offers parents hands-on learning experiences that will nourish a lifetime of teachable moments.  http://ecfe.info/ecfe-home/    APPS and Podcasts:   Gricelda Rubin Nurture    Evidence Based Birth  The Birth Hour (for birth stories)   Birthful   Expectful   The Longest Shortest Time  PregnancyPodcgerard Rojas  https://www.downtobirthshow.com/    Book Recommendations:   Caroline Paradise's Birthing From Within--first few chapters include a new-age tone, you may prefer to skip it and keep going, because there is good stuff later.  This book recommendation covers emotional preparation, but does cover coping with pain, and use of both pharmacological and nonpharmacological methods.    Guide to Childbirth by Jeri May  "Jyothi  Childbirth Without Fear by Rob Cody Read    Dr. Sanchez' The Pregnancy Book and The Birth Book--the pregnancy book goes month-by month    The Birth Partner by Anisa Malik    Womanly Art of Breastfeeding by La Leche League International   Bestfeeding by Laney Israel--great pictures    Mothering Your Nursing Toddler, by Faviola Riggs.   Addresses dealing with so many of the challenging behaviors of a nursing toddler.  How Weaning Happens, by La Leche League.  Discusses weaning at all ages, from medically necessary weaning of an infant, all the way up to age 5 (or older), with why/why not, and strategies.  Very empowering book both for deciding to wean and deciding not to.    American College of Nurse-Midwives (ACNM) http://www.midwife.org/; look at the informational handouts at http://www.midwife.org/Share-With-Women     www.mymidwife.org    Mother to Baby (Medication and Herbal guidance in pregnancy): http://www.mothertobaby.org  Toll-Free Hotline: 579.208.2582  LactMed (Medication use while breastfeeding): http://toxnet.nlm.nih.gov/newtoxnet/lactmed.htm    Women's Health.gov:  http://www.womenshealth.gov/a-z-topics/index.html    American pregnancy association - http://americanpregnancy.org    Centering Pregnancy (group prenatal care option): http://centeringhealthcare.org    March of Dimes www.Skuid.com     FDA - Nutrition  www.mypyramid.gov  Under \"For Consumers,\" click on \"pregnant and breastfeeding women.\"      Centers for Disease Control and Prevention (CDC) - Vaccines : http://www.cdc.gov/vaccines/       When researching information on the web, question the validity of websites.  The domains .gov, .edu and.org tend to be more reliable information.  If there are a lot of advertisements, be cautious of the information provided. Stay away from blogs and chat rooms please!     Virtual Breastfeeding Support:    During this time of isolation, breastfeeding families need even more community! " " Here are some area organizations offering virtual support groups for breastfeeding:    [employer] Support Group,  at 10:30 am   Run by MARISSA Pacheco of [employer] [facility]   Go to [employer] Facebook page and click on \"events\" for link   https://www.facebook.com/events/589063124621853/  [facility] Milk Hour,  at 2:30 pm    Run by MARISSA Gutierrez   Go to [facility] FB page and send message to get link   https://www.MyGrove Media.com/[facility]ettering Healthundations/  [facility] holding virtual meetings the first Tuesday of each month, 8-9 pm, and the   Third Saturday, 10 - 11 am.  Go to [facility] [place] FB page; message to get link https://www.[employer]Grove [employer].Zhilabs/LLLofGoldAlexx/?hc_location=[facility] offers a Lactation Lounge every Friday 12pm - 1pm, run by Marci Gan [facility] Leader   Sign up via link at https://www.[place]/cbe-lactation  [facility] is offering virtual support groups every Monday, 10:30 am - 12 pm, run by nurse IBCLC   Https://www.facebook.com/events/378261344604433/    Prenatal Breastfeeding Classes:      [facility] is offering virtual breastfeeding and  care classes:  https://www.[place]/education-workshops  BirthEd childbirth and breastfeeding education offering virtual prenatal breastfeeding classes  https://www.birthedmn.com/workshops    Breastfeeding clinic visits are at [facility] on , [facility] on  and [facility] on . Call to schedule, [phone].    New Parent Connection:   Elidia Hernadez, [id number] [address] [facility]  In-person meetings on  from 6 pm - 7:15 pm for parents of  to 9 months, at the same site.   All are free, drop-in, no registration required.    There are also free virtual meetings ongoing on " Tuesdays:  11:30 am - 12:30 pm for parents of newborns to 3 months  4:15 pm to 5:15 pm for parents of 3 to 9-month olds  For joining info parents should call Nereida Calderon at [phone]

## 2025-01-13 ENCOUNTER — PRENATAL OFFICE VISIT (OUTPATIENT)
Dept: MIDWIFE SERVICES | Facility: CLINIC | Age: 35
End: 2025-01-13
Payer: COMMERCIAL

## 2025-01-13 VITALS
OXYGEN SATURATION: 97 % | BODY MASS INDEX: 44.56 KG/M2 | DIASTOLIC BLOOD PRESSURE: 78 MMHG | WEIGHT: 274 LBS | SYSTOLIC BLOOD PRESSURE: 124 MMHG | HEART RATE: 87 BPM

## 2025-01-13 DIAGNOSIS — O26.899 RH NEGATIVE STATE IN ANTEPARTUM PERIOD: ICD-10-CM

## 2025-01-13 DIAGNOSIS — O09.90 SUPERVISION OF HIGH RISK PREGNANCY, ANTEPARTUM: Primary | ICD-10-CM

## 2025-01-13 DIAGNOSIS — Z67.91 RH NEGATIVE STATE IN ANTEPARTUM PERIOD: ICD-10-CM

## 2025-01-13 DIAGNOSIS — Z91.89 AT HIGH RISK FOR VENOUS THROMBOEMBOLISM (VTE): ICD-10-CM

## 2025-01-13 PROCEDURE — 99207 PR PRENATAL VISIT: CPT | Performed by: ADVANCED PRACTICE MIDWIFE

## 2025-01-13 NOTE — PROGRESS NOTES
Sourav is here for a routine prenatal visit. Reports normal fetal movements. Denies regular painful contractions, bleeding, leaking, changes in fetal movement.   32 week pregnancy checklist addressed, including discussion of CBE, breastfeeding, peds provider.  Has follow-up growth ultrasound and BPP scheduled with MFM.  Reviewed counseling on  labor precautions, warning signs and symptoms, and when/how to contact the on-call CNM. RTC 2 wks.   NV: LFTs and RSV.

## 2025-01-23 ENCOUNTER — MYC MEDICAL ADVICE (OUTPATIENT)
Dept: MIDWIFE SERVICES | Facility: CLINIC | Age: 35
End: 2025-01-23
Payer: COMMERCIAL

## 2025-01-23 ENCOUNTER — PRENATAL OFFICE VISIT (OUTPATIENT)
Dept: MIDWIFE SERVICES | Facility: CLINIC | Age: 35
End: 2025-01-23
Payer: COMMERCIAL

## 2025-01-23 ENCOUNTER — TELEPHONE (OUTPATIENT)
Dept: MIDWIFE SERVICES | Facility: CLINIC | Age: 35
End: 2025-01-23

## 2025-01-23 VITALS
BODY MASS INDEX: 44 KG/M2 | DIASTOLIC BLOOD PRESSURE: 86 MMHG | WEIGHT: 273.8 LBS | HEART RATE: 68 BPM | HEIGHT: 66 IN | SYSTOLIC BLOOD PRESSURE: 136 MMHG

## 2025-01-23 DIAGNOSIS — O26.899 RH NEGATIVE STATE IN ANTEPARTUM PERIOD: ICD-10-CM

## 2025-01-23 DIAGNOSIS — Z91.89 AT HIGH RISK FOR VENOUS THROMBOEMBOLISM (VTE): ICD-10-CM

## 2025-01-23 DIAGNOSIS — R11.2 NAUSEA AND VOMITING, UNSPECIFIED VOMITING TYPE: ICD-10-CM

## 2025-01-23 DIAGNOSIS — H43.393 FLOATERS IN VISUAL FIELD, BILATERAL: ICD-10-CM

## 2025-01-23 DIAGNOSIS — O09.90 SUPERVISION OF HIGH RISK PREGNANCY, ANTEPARTUM: Primary | ICD-10-CM

## 2025-01-23 DIAGNOSIS — Z67.91 RH NEGATIVE STATE IN ANTEPARTUM PERIOD: ICD-10-CM

## 2025-01-23 PROBLEM — E87.6 HYPOKALEMIA: Status: ACTIVE | Noted: 2025-01-23

## 2025-01-23 LAB
ALBUMIN MFR UR ELPH: 9.5 MG/DL
ALBUMIN SERPL-MCNC: 2.8 G/DL (ref 3.4–5)
ALP SERPL-CCNC: 106 U/L (ref 40–150)
ALT SERPL W P-5'-P-CCNC: 33 U/L (ref 0–50)
ANION GAP SERPL CALCULATED.3IONS-SCNC: 10 MMOL/L (ref 3–14)
AST SERPL W P-5'-P-CCNC: 24 U/L (ref 0–45)
BASOPHILS # BLD AUTO: 0 10E3/UL (ref 0–0.2)
BASOPHILS NFR BLD AUTO: 0 %
BILIRUB SERPL-MCNC: 0.5 MG/DL (ref 0.2–1.3)
BUN SERPL-MCNC: 7 MG/DL (ref 7–30)
CALCIUM SERPL-MCNC: 9.3 MG/DL (ref 8.5–10.1)
CHLORIDE BLD-SCNC: 106 MMOL/L (ref 94–109)
CO2 SERPL-SCNC: 25 MMOL/L (ref 20–32)
CREAT SERPL-MCNC: 1 MG/DL (ref 0.52–1.04)
CREAT UR-MCNC: 65.6 MG/DL
EGFRCR SERPLBLD CKD-EPI 2021: 75 ML/MIN/1.73M2
EOSINOPHIL # BLD AUTO: 0.1 10E3/UL (ref 0–0.7)
EOSINOPHIL NFR BLD AUTO: 1 %
ERYTHROCYTE [DISTWIDTH] IN BLOOD BY AUTOMATED COUNT: 12.7 % (ref 10–15)
GLUCOSE BLD-MCNC: 87 MG/DL (ref 70–99)
HCT VFR BLD AUTO: 29.4 % (ref 35–47)
HGB BLD-MCNC: 10.2 G/DL (ref 11.7–15.7)
IMM GRANULOCYTES # BLD: 0 10E3/UL
IMM GRANULOCYTES NFR BLD: 0 %
LYMPHOCYTES # BLD AUTO: 1.7 10E3/UL (ref 0.8–5.3)
LYMPHOCYTES NFR BLD AUTO: 16 %
MCH RBC QN AUTO: 31.7 PG (ref 26.5–33)
MCHC RBC AUTO-ENTMCNC: 34.7 G/DL (ref 31.5–36.5)
MCV RBC AUTO: 91 FL (ref 78–100)
MONOCYTES # BLD AUTO: 0.8 10E3/UL (ref 0–1.3)
MONOCYTES NFR BLD AUTO: 7 %
NEUTROPHILS # BLD AUTO: 7.7 10E3/UL (ref 1.6–8.3)
NEUTROPHILS NFR BLD AUTO: 75 %
PLATELET # BLD AUTO: 220 10E3/UL (ref 150–450)
POTASSIUM BLD-SCNC: 3.1 MMOL/L (ref 3.4–5.3)
PROT SERPL-MCNC: 6.7 G/DL (ref 6.8–8.8)
PROT/CREAT 24H UR: 0.14 MG/MG CR (ref 0–0.2)
RBC # BLD AUTO: 3.22 10E6/UL (ref 3.8–5.2)
SODIUM SERPL-SCNC: 141 MMOL/L (ref 135–145)
WBC # BLD AUTO: 10.3 10E3/UL (ref 4–11)

## 2025-01-23 RX ORDER — ASPIRIN 81 MG/1
81 TABLET, CHEWABLE ORAL DAILY
COMMUNITY

## 2025-01-23 NOTE — PROGRESS NOTES
"Sourav presents to [facility] clinic for an extra problem prenatal visit at 33w2d.Pt presents with David.    Seen by on-call CNM today in clinic due to complaint of new onset nausea and vomiting x 2 days (see telephone note), new floaters in visual field x 2 days, some swelling (but not new or increasing).  Denies headaches, blurry vision, double vision, flashing lights, aura, epigastric pain.    Blood pressure today in clinic is 136/86 (usually 120's/70's)  Feeling \"off\".  Followed by MFM for elevated BMI 48, fatty liver.  Will have growth ultrasound every 4 weeks and weekly BPP starting at 34 weeks.  Next M visit is on 1/27/2025 they recommend every 4 week LFTs so we will get those today as well as other HELLP labs.  Reports normal fetal movements. Discussed [facility]. Denies regular painful contractions, bleeding, leaking, changes in fetal movement.   Pretibial pitting edema +1/+1, DTR +1/+1 no clonus bilaterally    Stat Labs today: CMP, CBC. Random urine (Pr/Cr).  Questions about Tylenol dose, hip pain/discomfort. (Pt has PT appt next week)  Lots of discussion about possibility of developing Gest HTN/Pre-E (written handout given reviewing parameters and s/s to report, lab work.    Reviewed when/how to call the on-call CNM.     RTC in 1-2 wks depending on labs and s/s.,  Offer RSV    TT with pt in clinic today: 30 min      "

## 2025-01-23 NOTE — TELEPHONE ENCOUNTER
"PHONE NOTE: Patient paged the midwife back, felt this CNM on-call spoke with patient to discuss her symptoms.  Patient has new onset nausea/vomiting third trimester.  She is a 34-year-old G1, P0 at 33 weeks 2 days gestation.  This started 2 days ago.  On Tuesday night at 7:30 PM she had some nausea and vomiting after eating then she kept some food down later that evening.  Yesterday morning at 11 AM she had nausea and vomiting.  Then she felt better.  She can keep down liquids, smoothies.  She does not feel ill in any other way.  No fever.  She has some achiness in her pelvis and hip joints however she is in third trimester of pregnancy.  The only symptom patient reports is some new floaters in her eyes.  \"I noticed them more in the morning and in the evening\".  These started about 2 days ago. She also has some ongoing swelling in her feet and ankles, and wears some compression stockings but this has not worsened.  Baby is active.  Reviewed importance of daily fetal movement counting.  Patient states that she has a good quality, working blood pressure home cuff and she will take her blood pressure now and let us know if she has any values 140/90 or greater (either value).  Also recommended that she be seen by midwife in the clinic today or tomorrow for in person evaluation.  If she cannot get an appointment in the clinic today or tomorrow, we will try to evaluate her on [facility].  I have reviewed all signs and symptoms of preeclampsia with patient and she knows when to call/come in.  Reviewed phone numbers for appointment and for midwife on-call if patient has any new or increased symptoms in the next 24 hours or so.  Patient knows how to get ahold of the midwife on-call if needed.  Patient is to have a low threshold for any increasing symptoms.  Sending message to the  so they can reach out to patient to see if they can fit her into our schedule.    Addendum: There were no appointments available in " clinic so this midwife will try to see clinic in a double book spot this afternoon sometime.

## 2025-01-23 NOTE — TELEPHONE ENCOUNTER
PHONE NOTE: CNM called patient with lab results.  (Still waiting for protein creatinine ratio to come back).  Liver function tests are normal.  Potassium level is 3.1 (low)-- recommended a daily over-the-counter supplement for potassium, high potassium foods including bananas and potatoes, and electrolyte replacement fluids as she is able like emergen-C packets  or clear Gatorade.  She should start the potassium supplement right away.  Reviewed that her hemoglobin and hematocrit were low but that her platelets were fine.  Patient has not been supplementing iron.  So she will start taking a slow FE, vitamin C, vitamin B12 as recommended.  Patient can wait to start the iron supplements until she is feeling better.  No further questions.

## 2025-01-26 ENCOUNTER — TELEPHONE (OUTPATIENT)
Dept: MIDWIFE SERVICES | Facility: CLINIC | Age: 35
End: 2025-01-26
Payer: COMMERCIAL

## 2025-01-26 ENCOUNTER — HOSPITAL ENCOUNTER (OUTPATIENT)
Facility: HOSPITAL | Age: 35
Discharge: HOME | End: 2025-01-27
Attending: ADVANCED PRACTICE MIDWIFE | Admitting: ADVANCED PRACTICE MIDWIFE
Payer: COMMERCIAL

## 2025-01-26 VITALS — SYSTOLIC BLOOD PRESSURE: 151 MMHG | DIASTOLIC BLOOD PRESSURE: 76 MMHG

## 2025-01-26 DIAGNOSIS — Z91.89 AT HIGH RISK FOR VENOUS THROMBOEMBOLISM (VTE): ICD-10-CM

## 2025-01-26 DIAGNOSIS — O09.90 SUPERVISION OF HIGH RISK PREGNANCY, ANTEPARTUM: ICD-10-CM

## 2025-01-26 PROBLEM — O16.3 HYPERTENSION AFFECTING PREGNANCY IN THIRD TRIMESTER: Status: ACTIVE | Noted: 2025-01-26

## 2025-01-26 PROBLEM — Z36.89 ENCOUNTER FOR TRIAGE IN PREGNANT PATIENT: Status: ACTIVE | Noted: 2025-01-26

## 2025-01-26 LAB
ALBUMIN MFR UR ELPH: <6 MG/DL
ALBUMIN SERPL BCG-MCNC: 3.4 G/DL (ref 3.5–5.2)
ALP SERPL-CCNC: 121 U/L (ref 40–150)
ALT SERPL W P-5'-P-CCNC: 50 U/L (ref 0–50)
ANION GAP SERPL CALCULATED.3IONS-SCNC: 12 MMOL/L (ref 7–15)
AST SERPL W P-5'-P-CCNC: 30 U/L (ref 0–45)
BILIRUB SERPL-MCNC: 0.2 MG/DL
BUN SERPL-MCNC: 5.5 MG/DL (ref 6–20)
CALCIUM SERPL-MCNC: 8.5 MG/DL (ref 8.8–10.4)
CHLORIDE SERPL-SCNC: 103 MMOL/L (ref 98–107)
CREAT SERPL-MCNC: 0.86 MG/DL (ref 0.51–0.95)
CREAT UR-MCNC: 38.6 MG/DL
EGFRCR SERPLBLD CKD-EPI 2021: 90 ML/MIN/1.73M2
ERYTHROCYTE [DISTWIDTH] IN BLOOD BY AUTOMATED COUNT: 12.8 % (ref 10–15)
GLUCOSE SERPL-MCNC: 88 MG/DL (ref 70–99)
HCO3 SERPL-SCNC: 24 MMOL/L (ref 22–29)
HCT VFR BLD AUTO: 28.4 % (ref 35–47)
HGB BLD-MCNC: 9.7 G/DL (ref 11.7–15.7)
MCH RBC QN AUTO: 31.3 PG (ref 26.5–33)
MCHC RBC AUTO-ENTMCNC: 34.2 G/DL (ref 31.5–36.5)
MCV RBC AUTO: 92 FL (ref 78–100)
PLATELET # BLD AUTO: 218 10E3/UL (ref 150–450)
POTASSIUM SERPL-SCNC: 3.4 MMOL/L (ref 3.4–5.3)
PROT SERPL-MCNC: 6.3 G/DL (ref 6.4–8.3)
PROT/CREAT 24H UR: NORMAL MG/G{CREAT}
RBC # BLD AUTO: 3.1 10E6/UL (ref 3.8–5.2)
SODIUM SERPL-SCNC: 139 MMOL/L (ref 135–145)
WBC # BLD AUTO: 10.8 10E3/UL (ref 4–11)

## 2025-01-26 PROCEDURE — 85048 AUTOMATED LEUKOCYTE COUNT: CPT | Performed by: ADVANCED PRACTICE MIDWIFE

## 2025-01-26 PROCEDURE — 82310 ASSAY OF CALCIUM: CPT | Performed by: ADVANCED PRACTICE MIDWIFE

## 2025-01-26 PROCEDURE — 85018 HEMOGLOBIN: CPT | Performed by: ADVANCED PRACTICE MIDWIFE

## 2025-01-26 PROCEDURE — 84156 ASSAY OF PROTEIN URINE: CPT | Performed by: ADVANCED PRACTICE MIDWIFE

## 2025-01-26 PROCEDURE — 36415 COLL VENOUS BLD VENIPUNCTURE: CPT | Performed by: ADVANCED PRACTICE MIDWIFE

## 2025-01-26 PROCEDURE — 250N000011 HC RX IP 250 OP 636: Performed by: ADVANCED PRACTICE MIDWIFE

## 2025-01-26 PROCEDURE — 82247 BILIRUBIN TOTAL: CPT | Performed by: ADVANCED PRACTICE MIDWIFE

## 2025-01-26 PROCEDURE — 96372 THER/PROPH/DIAG INJ SC/IM: CPT | Performed by: ADVANCED PRACTICE MIDWIFE

## 2025-01-26 RX ORDER — BETAMETHASONE SODIUM PHOSPHATE AND BETAMETHASONE ACETATE 3; 3 MG/ML; MG/ML
12 INJECTION, SUSPENSION INTRA-ARTICULAR; INTRALESIONAL; INTRAMUSCULAR; SOFT TISSUE ONCE
Status: COMPLETED | OUTPATIENT
Start: 2025-01-26 | End: 2025-01-26

## 2025-01-26 RX ORDER — LIDOCAINE 40 MG/G
CREAM TOPICAL
Status: DISCONTINUED | OUTPATIENT
Start: 2025-01-26 | End: 2025-01-27 | Stop reason: HOSPADM

## 2025-01-26 RX ADMIN — BETAMETHASONE SODIUM PHOSPHATE AND BETAMETHASONE ACETATE 12 MG: 3; 3 INJECTION, SUSPENSION INTRA-ARTICULAR; INTRALESIONAL; INTRAMUSCULAR at 22:40

## 2025-01-26 ASSESSMENT — ACTIVITIES OF DAILY LIVING (ADL)
ADLS_ACUITY_SCORE: 41

## 2025-01-27 ENCOUNTER — ANCILLARY PROCEDURE (OUTPATIENT)
Dept: ULTRASOUND IMAGING | Facility: HOSPITAL | Age: 35
End: 2025-01-27
Attending: OBSTETRICS & GYNECOLOGY
Payer: COMMERCIAL

## 2025-01-27 ENCOUNTER — DOCUMENTATION ONLY (OUTPATIENT)
Dept: MIDWIFE SERVICES | Facility: CLINIC | Age: 35
End: 2025-01-27

## 2025-01-27 ENCOUNTER — OFFICE VISIT (OUTPATIENT)
Dept: MATERNAL FETAL MEDICINE | Facility: HOSPITAL | Age: 35
End: 2025-01-27
Attending: OBSTETRICS & GYNECOLOGY
Payer: COMMERCIAL

## 2025-01-27 VITALS — HEART RATE: 100 BPM | SYSTOLIC BLOOD PRESSURE: 128 MMHG | DIASTOLIC BLOOD PRESSURE: 87 MMHG

## 2025-01-27 DIAGNOSIS — O99.213 OBESITY DURING PREGNANCY IN THIRD TRIMESTER: ICD-10-CM

## 2025-01-27 DIAGNOSIS — Z91.89 AT HIGH RISK FOR VENOUS THROMBOEMBOLISM (VTE): ICD-10-CM

## 2025-01-27 DIAGNOSIS — O09.90 SUPERVISION OF HIGH RISK PREGNANCY, ANTEPARTUM: ICD-10-CM

## 2025-01-27 DIAGNOSIS — O13.9 GESTATIONAL HYPERTENSION, ANTEPARTUM: Primary | ICD-10-CM

## 2025-01-27 PROBLEM — O13.3 GESTATIONAL HYPERTENSION, THIRD TRIMESTER: Status: ACTIVE | Noted: 2025-01-27

## 2025-01-27 LAB — HOLD SPECIMEN: NORMAL

## 2025-01-27 PROCEDURE — 76816 OB US FOLLOW-UP PER FETUS: CPT

## 2025-01-27 PROCEDURE — G0463 HOSPITAL OUTPT CLINIC VISIT: HCPCS

## 2025-01-27 PROCEDURE — 76819 FETAL BIOPHYS PROFIL W/O NST: CPT | Mod: 26 | Performed by: OBSTETRICS & GYNECOLOGY

## 2025-01-27 PROCEDURE — 99212 OFFICE O/P EST SF 10 MIN: CPT | Mod: 25 | Performed by: OBSTETRICS & GYNECOLOGY

## 2025-01-27 PROCEDURE — 99213 OFFICE O/P EST LOW 20 MIN: CPT | Mod: 25 | Performed by: OBSTETRICS & GYNECOLOGY

## 2025-01-27 PROCEDURE — 76819 FETAL BIOPHYS PROFIL W/O NST: CPT

## 2025-01-27 PROCEDURE — 76816 OB US FOLLOW-UP PER FETUS: CPT | Mod: 26 | Performed by: OBSTETRICS & GYNECOLOGY

## 2025-01-27 NOTE — PROGRESS NOTES
Data: Patient assessed in the Birthplace for  BP eval . Cervical exam deferred. Membranes intact. Contractions are not present. See flowsheets for fetal assessment documentation.     Action: Presumed adequate fetal oxygenation documented. Discharge instructions reviewed. Patient instructed to report change in fetal movement, vaginal leaking of fluid or bleeding, abdominal pain, or any concerns related to the pregnancy to provider/clinic.      Response: Orders to discharge home per Ely Kasper CNM. Patient verbalized understanding of education and agreement with plan. Discharged to home at 0004.

## 2025-01-27 NOTE — PROGRESS NOTES
Outpatient Note:    Patient Name:  Sourav Mascorro  :      1990  MRN:      [id number]    Assessment:  Sourav is a 34 year old  @ 33w5d here for evaluation of elevated BP's. She had been monitoring at home and noted elevated BP's. Was instructed to come into [facility] for evaluation.     Plan:   -Draw labs for preeclampsia  -1 dose of betamethasone  -Continue to monitor blood pressures    Subjective:  Sourav Mascorro is a 34 year old  at 33w 5 d, with an EDC of 3/11/25 who presented to [facility] for evaluation of elevated blood pressures at home. Denies headache, visual changes, right upper quadrant pain.  Does note that she has vomited 4 times in the past week while eating or shortly thereafter.  Not currently experiencing any nausea.  Denies leaking of fluid, bleeding, or changes in fetal movement.     Antepartum chart reviewed. Followed by [facility] Nurse-Midwives at the with clinic with consistent antepartum care starting at 10 weeks and 6 days GA. Noted risk factors: Prepregnancy BMI of 45, fatty liver, exercised introduced asthma, hypothyroidism, hypokalemia, Rh-, at risk for venous thrombosis, history of an eating disorder, major depressive disorder, elevated BPs.      Objective:  Vital signs: Initial BP of 162/93.  Cuff size was determined to be inappropriate and changed to a large cuff.  1 additional severe range blood pressure of 161/80.       FHR: 120 baseline, moderate variability, 15 X 15 accelerations present, decelerations absent.   Uterine contractions: None    Physical Exam: Bilateral dependent +1 edema.  Sourav states that this has not changed over the last few weeks.  Reflexes normal, clonus absent  Abdomen: SIUP, abdomen non-tender  SVE: Deferred    Results:  Recent Results (from the past 24 hours)   CBC with platelets    Collection Time: 25 10:49 PM   Result Value Ref Range    WBC Count 10.8 4.0 - 11.0 10e3/uL    RBC Count 3.10 (L) 3.80 - 5.20  10e6/uL    Hemoglobin 9.7 (L) 11.7 - 15.7 g/dL    Hematocrit 28.4 (L) 35.0 - 47.0 %    MCV 92 78 - 100 fL    MCH 31.3 26.5 - 33.0 pg    MCHC 34.2 31.5 - 36.5 g/dL    RDW 12.8 10.0 - 15.0 %    Platelet Count 218 150 - 450 10e3/uL       Provider:Ely Kasper CNM    40 minutes spent on the date of the encounter doing chart review, review of test results, patient visit and documentation

## 2025-01-27 NOTE — PROGRESS NOTES
Outpatient Note:    Patient Name:  Sourav Mascorro  :      1990  MRN:      [id number]    Assessment:  Sourav ORTEGA @ 33w6d here for evaluation of elevated BP's with one severe range pressure. All labs WNL.     Plan:   -MFM visit  as scheduled  -[place] [facility] [place] scheduled for 2nd dose of betamethasone and BP check.  -Continue to check BP's at home BID.    - Discharge to home undelivered. Reviewed warning signs including decreased fetal movement, leaking of fluid, vaginal bleeding, or signs of labor. Discussed s/s of preeclampsia and Sourav verbalized understanding. Reviewed how to contact on-call CNM. Follow-up in clinic with MFM tomorrow. All questions answered. Agrees with plan.     Objective:  Vital signs:   BP: (139-162)/(76-93) 151/76    FHR: 120 baseline, moderate variability, 15 X 15 accelerations present, decelerations none.   Uterine contractions:none    Results:  Recent Results (from the past 24 hours)   Protein  random urine    Collection Time: 25 10:43 PM   Result Value Ref Range    Total Protein Urine mg/dL <6.0   mg/dL    Total Protein Urine mg/mg Creat      Creatinine Urine mg/dL 38.6 mg/dL   CBC with platelets    Collection Time: 25 10:49 PM   Result Value Ref Range    WBC Count 10.8 4.0 - 11.0 10e3/uL    RBC Count 3.10 (L) 3.80 - 5.20 10e6/uL    Hemoglobin 9.7 (L) 11.7 - 15.7 g/dL    Hematocrit 28.4 (L) 35.0 - 47.0 %    MCV 92 78 - 100 fL    MCH 31.3 26.5 - 33.0 pg    MCHC 34.2 31.5 - 36.5 g/dL    RDW 12.8 10.0 - 15.0 %    Platelet Count 218 150 - 450 10e3/uL   Comprehensive metabolic panel    Collection Time: 25 10:49 PM   Result Value Ref Range    Sodium 139 135 - 145 mmol/L    Potassium 3.4 3.4 - 5.3 mmol/L    Carbon Dioxide (CO2) 24 22 - 29 mmol/L    Anion Gap 12 7 - 15 mmol/L    Urea Nitrogen 5.5 (L) 6.0 - 20.0 mg/dL    Creatinine 0.86 0.51 - 0.95 mg/dL    GFR Estimate 90 >60 mL/min/1.73m2    Calcium 8.5 (L) 8.8 - 10.4 mg/dL    Chloride 103 98  - 107 mmol/L    Glucose 88 70 - 99 mg/dL    Alkaline Phosphatase 121 40 - 150 U/L    AST 30 0 - 45 U/L    ALT 50 0 - 50 U/L    Protein Total 6.3 (L) 6.4 - 8.3 g/dL    Albumin 3.4 (L) 3.5 - 5.2 g/dL    Bilirubin Total 0.2 <=1.2 mg/dL   Extra Red Top Tube    Collection Time: 01/26/25 10:49 PM   Result Value Ref Range    Hold Specimen JI        Provider:Ely Kasper CNM  10 minutes spent on the date of the encounter doing chart review, review of test results, patient visit and documentation

## 2025-01-27 NOTE — PROGRESS NOTES
Data: [facility] presented to [facility]: 2025  8:50 PM.  Reason for maternal/fetal assessment is  BP eval . Patient reports blood pressures with diastolic over 90. Patient denies uterine contractions, leaking of vaginal fluid/rupture of membranes, vaginal bleeding, abdominal pain, pelvic pressure, nausea, vomiting, headache, visual disturbances, epigastric or RUQ pain, significant edema. Patient reports fetal movement is normal. Patient is a 33w5d .  Prenatal record reviewed. Pregnancy has been complicated by obesity (pre-pregnancy BMI >=35) and Fatty liver .    Vital signs  show BP severe x1 but elevated for the rest . Support person is present.     Action: Verbal consent for EFM. Triage assessment completed.     Response: Patient verbalized agreement with plan. Will contact RACHEL Kasper with update and further orders.      Keep patient on the monitor and serial Bps for another hour.

## 2025-01-27 NOTE — NURSING NOTE
Sourav Mascorro is a  at 33w6d who presents to [facility] for a follow-up growth US and BPP. Pt reports positive fetal movement. Pt denies bldg/lof/change in discharge, contractions, headache, vision changes, chest pain/SOB or edema. SBAR given to Dr. Marie, see note in Epic.      Cierra Deluca RN

## 2025-01-28 ENCOUNTER — OFFICE VISIT (OUTPATIENT)
Dept: MIDWIFE SERVICES | Facility: CLINIC | Age: 35
End: 2025-01-28
Payer: COMMERCIAL

## 2025-01-28 VITALS — SYSTOLIC BLOOD PRESSURE: 138 MMHG | HEART RATE: 77 BPM | OXYGEN SATURATION: 99 % | DIASTOLIC BLOOD PRESSURE: 82 MMHG

## 2025-01-28 DIAGNOSIS — Z71.89 ENCOUNTER FOR ANTEPARTUM CONSULTATION REGARDING LACTATION: ICD-10-CM

## 2025-01-28 DIAGNOSIS — N64.89 BREAST ASYMMETRY: Primary | ICD-10-CM

## 2025-01-28 PROCEDURE — 99215 OFFICE O/P EST HI 40 MIN: CPT | Mod: 24 | Performed by: ADVANCED PRACTICE MIDWIFE

## 2025-01-28 PROCEDURE — 99417 PROLNG OP E/M EACH 15 MIN: CPT | Mod: 24 | Performed by: ADVANCED PRACTICE MIDWIFE

## 2025-01-28 ASSESSMENT — EDINBURGH POSTNATAL DEPRESSION SCALE (EPDS)
TOTAL SCORE: 11
I HAVE BEEN ABLE TO LAUGH AND SEE THE FUNNY SIDE OF THINGS: AS MUCH AS I ALWAYS COULD
I HAVE BEEN ANXIOUS OR WORRIED FOR NO GOOD REASON: YES, SOMETIMES
I HAVE BEEN SO UNHAPPY THAT I HAVE HAD DIFFICULTY SLEEPING: NOT VERY OFTEN
I HAVE BLAMED MYSELF UNNECESSARILY WHEN THINGS WENT WRONG: YES, SOME OF THE TIME
I HAVE BEEN SO UNHAPPY THAT I HAVE BEEN CRYING: ONLY OCCASIONALLY
I HAVE FELT SCARED OR PANICKY FOR NO GOOD REASON: YES, SOMETIMES
I HAVE LOOKED FORWARD WITH ENJOYMENT TO THINGS: AS MUCH AS I EVER DID
I HAVE FELT SAD OR MISERABLE: NOT VERY OFTEN
THE THOUGHT OF HARMING MYSELF HAS OCCURRED TO ME: NEVER
THINGS HAVE BEEN GETTING ON TOP OF ME: YES, SOMETIMES I HAVEN'T BEEN COPING AS WELL AS USUAL

## 2025-01-28 NOTE — PROGRESS NOTES
A:     Primigravida planning breastfeeding  Likely expecting early-term infant  Breast asymmetry, with left larger than right.  Possible decreased glandular tissue right side.    P:    Discussed the importance of immediate breastfeeding and frequent skin to skin contact, definitely in the first 1-2 hours postpartum and in the first few days and week to support effective breastfeeding. Reviewed normal baby behaviors and cycles of alertness in first days.  Explained value of early and frequent hand expression and its relationship to improved latch and more confidence with breastfeeding, as well as potential value for baby if supplementation is needed. Demonstrated hand expression today, and given link to video on prenatal preparation for effective breastfeeding.  Discussed option of prenatal colostrum expression:  she is interested.  Provided with collection kit.  Encouraged acceptance of help from others, to allow for lots of time spent holding baby and establishing breastfeeding. Encouraged limiting the time her baby is swaddled and being held by other family or friends, as this can overwhelm the baby and decrease the effectiveness of breastfeeding in the few days, a crucial time for establishing breastfeeding.  Reviewed techniques for attaining deep latch, and several different positions that can be used for comfortable nursing.  Discussed the potential of decreased glandular tissue on the right side, but that capability of milk production is unknown until after breastfeeding becomes established.  Discussed frequent feeding and potential use of hospital grade pump (which she has at home) to maximize milk supply.  Discussed how to tell if baby is getting enough milk and swallowing at breast; provided with link to video demonstrating good latch and swallowing, as well as discussing output goals.  Reassured that there is no need to track the number of minutes that baby nurses;  the number of minutes spent at the  "breast does not equal the amount of milk that they take.  Encouraged to watch baby for signs of active drinking--if they are swallowing regularly, there is no need to stop them, but if they have finished drinking and are just occasionally making sucking motions or dozing, you do not need to continue the feeding.  What the baby is doing at your breast is more important than how many minutes they are there. Eliminating keeping track of minutes spent nursing will often decrease both workload and stress.     Discussed supply/demand system of milk supply, and the importance of regular, frequent feedings to maintain good supply.  Reviewed normal pattern of early weight loss, and discussed that babies need to eat 8-12 times/day, although this is not always evenly distributed throughout the day.  Discussed role of breastpump and indications for using a breastpump postpartum. Has secondhand wearable Zomee as well as Medela Symphony pump given by a friend.  Discussed how to properly use passive milk-collecting devices.   Discussed potential issues of early-term infants, such as smaller size, difficulty maintaining energy for feeding, jaundice, hypoglycemia or hypothermia.  Discussed pumping may be necessary for supplementation or if  from infant.   Recommended requesting a visit by IBCLC (lactation consultant) while on postpartum unit.   Discussed outpatient support including community resources and [facility] outpatient lactation services--encouraged to set up early postpartum appt.        S:  Sourav is here for prenatal lactation consultation at 34 weeks gestation.  She is concerned because this is her first baby and has noticed some significant asymmetry between her breasts, with her right breast smaller than her left.  She has also noticed that her smaller breast has some \"bumps\" around her nipple, which have been present as long as she can recall.  Feels her left breast is more normal.  Due to her " "hypertension she is likely expecting induction of labor at 37 weeks.    Has noticed breast changes in pregnancy including some areolar darkening and a small amount of enlargement.     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  Hx of eating disorder  No history of breast surgeries.   No hormonal disorders, such as infertility, irregular cycles, diabetes    She has received Covid vaccination, with most recent dose 10/23/24    Breast exam:   Nipples:  everted but slightly retractile;  small drops of colostrum expressible  Breasts:  Pendulous bilaterally, with right breast smaller than left.  Right breast also has multiple small raised areas, slightly red and of varying sizes, scattered over the central part of the breast.  There is no evidence of inflammation.      /82 (BP Location: Right arm, Patient Position: Sitting, Cuff Size: Adult Large)   Pulse 77   LMP 2024 (Exact Date)   SpO2 99%   OB History    Para Term  AB Living   1 0 0 0 0 0   SAB IAB Ectopic Multiple Live Births   0 0 0 0 0      # Outcome Date GA Lbr Zhou/2nd Weight Sex Type Anes PTL Lv   1 Current              Past Medical History:   Diagnosis Date    Cutaneous lupus erythematosus 2024    Had a skin rash on her right forearm for 7 years, biopsied and diagnosed with cutaneous lupus in 2022, does not have \"full-blown lupus\"     10/11/24: MFM Consult:  -She has not been diagnosed with SLE and states she is scheduled to follow up with Dermatology on 10/22 but has not required any treatment for this.   -We reviewed that pregnancy outcomes for women with JOSE are generally very fa    Depressive disorder     Since age 12, on Wellbutrin since 2016.  No therapist at this time.  No mental health hospitalizations in her past.    Eating disorder     Reactive airway disease 2023    Exercise-induced, uses albuterol as " needed.    Thyroid disease 2022     Past Surgical History:   Procedure Laterality Date    HC REMOVAL OF TONSILS,<11 Y/O      Description: Tonsillectomy;  Recorded: 01/13/2012;     Family History   Problem Relation Age of Onset    Breast Cancer Mother 37    Alcoholism Mother     Diabetes Father     Obesity Father     Alcoholism Father     Breast Cancer Maternal Grandmother     Heart Disease Paternal Grandmother         chf    Depression Brother     Alcoholism Brother     Obesity Sister     Asthma Sister     Cancer Other         cervival    Breast Cancer Other        Current Outpatient Medications:     albuterol (PROAIR HFA/PROVENTIL HFA/VENTOLIN HFA) 108 (90 Base) MCG/ACT inhaler, Inhale 2 puffs into the lungs every 4 hours as needed for cough., Disp: , Rfl:     aspirin (ASA) 81 MG chewable tablet, Take 81 mg by mouth daily., Disp: , Rfl:     buPROPion (WELLBUTRIN XL) 300 MG 24 hr tablet, Take 1 tablet (300 mg) by mouth every morning., Disp: 90 tablet, Rfl: 3    Ferrous Sulfate (IRON PO), Take by mouth., Disp: , Rfl:     ketoconazole (NIZORAL) 2 % external shampoo, Apply topically daily as needed for itching or irritation., Disp: 120 mL, Rfl: 3    levothyroxine (SYNTHROID) 25 MCG tablet, Take 1 tablet (25 mcg) by mouth daily., Disp: 90 tablet, Rfl: 3    Prenatal Vit-Fe Fumarate-FA (PRENATAL MULTIVITAMIN  PLUS IRON) 27-1 MG TABS, Take 1 tablet by mouth daily, Disp: , Rfl:   No current facility-administered medications for this visit.        Time spent:  Face-to-face visit:  50 min  Documentation:  11 min  Total time spent on day of service:  61 min     Iman Greene, SHELDON, CNM, IBCLC

## 2025-01-28 NOTE — PATIENT INSTRUCTIONS
"  Immediate breastfeeding and frequent skin to skin contact, definitely in the first 1-2 hours postpartum and in the first few days and week, is very important to support effective breastfeeding and establish a good milk supply.   Early and frequent hand expression can be very helpful with helping to get baby latched well to the breast, developing confidence in breastfeeding, and possibly having some extra milk available if your baby needs this during the first days.  I It can be helpful to practice this a bit beforehand. (See attached video from [employer]).  You can begin collecting colostrum before baby comes, to be used if they need extra milk in the first few days.  Use the collection kit provided.  Consider limiting the time baby is swaddled and being held by other family or friends, as this can overwhelm the baby and decrease the effectiveness of breastfeeding in the few days.  Allow your family, friends or other support people to do the household tasks and activities while you rest and care for your baby.  There are several different positions that can be used for comfortable nursing and helping baby latch deeply to the breast, including the cross-cradle, football and laid-back positions. It can be very helpful to use a pillow to elevate the baby a bit, and a footstool to help you be comfortable without having to \"hunch\" over the baby.  It is possible that you have some decreased glandular (milk-making) tissue on the right breast, but capability of milk production is not known until after breastfeeding becomes established. Frequent feeding and potential use of hospital grade pump (which you have at home) can help to maximize milk supply.  Regular, frequent feedings are important to maintain good supply, as milk production is a supply and demand system.  Babies need to eat 8 -12 times/day, although this is not always evenly distributed.  During the first week or so, you may need to awaken your baby for " nursing, but once they are over their birthweight, you can allow them to direct their feeding schedule.   The main ways to tell if baby is getting enough milk are whether you can hear them swallowing when nursing, and their wet and dirty diapers--they should have about one wet diaper for each day of life through the first week (one wet diaper on the first day, two on the second day, etc), and their stools should be turning from dark black/brown to yellow by about day four.  See the attached video.  There is no need to track the number of minutes that your baby nurses;  the number of minutes that they spend at the breast does not equal the amount of milk that they take.  Just watch your baby for signs of active drinking--if they are swallowing regularly, there is no need to stop them, but if they have finished drinking and are just occasionally making sucking motions or dozing, you do not need to continue the feeding.  What the baby is doing at your breast is more important than how many minutes they are there. Eliminating keeping track of minutes spent nursing will often decrease both workload and stress.     Consider requesting a visit by an IBCLC (lactation consultant) while on postpartum unit.   A breast pump can be useful if your baby is having difficulty latching to the breast during the first days, or if you have to be  from your baby, to help stimulate your breasts to produce a good milk supply.  Some potential issues of babies born at 37 weeks can be smaller size, difficulty maintaining energy for feeding, jaundice, and low blood sugar or temperature.  Some pumping may be necessary for supplementation or if  from infant. Passive milk-collecting devices can be used after the first week or two if desired, but are not necessary, and should not be used routinely every feeding.  Support for breastfeeding after baby is born can include [facility], as well as  "community resources such as [facility] and a variety of parenting support groups.  Consider setting up an early postpartum lactation appt to check baby's latch and weight gain, and deal with any concerns that have come up.      ____________    There is a really wonderful video that shows you how to establish a good milk supply and avoid nipple pain, by doing some practice at the end of pregnancy and then using your hands after the baby comes.  It shows you ways to get the baby latched on well and comfortably after birth--this is the main thing that helps to get a good milk supply established and avoid nipple pain.  You can find it here:  www.Quora.com.  Click on \"expecting a term baby.\"  I think you'll find it really helpful!  The whole site is great, actually, but start with that.  I also love the video on attachment, under \"ABC's.\"      ___________      This is a great video that shows a baby nursing well at the breast, and how to tell when baby is actively swallowing:      Is Your Baby Getting Enough Milk?    https://[employer].org/videos/is-your-baby-getting-enough-milk/    _______________    Good breastfeeding resources:    Websites:  www.[employer]  www.[employer].eASIC/blog/  www.llli.org/breastfeeding-info/    Instagram:    @[employer]  @thebetterboob    Books:   The Womanly Art of Breastfeeding by [employer]  Breastfeeding Doesn't Need to Suck, by Olya Mazariegos      For help with using baby carriers:  https://babywearingtwincities.org/    "

## 2025-01-30 ENCOUNTER — HOSPITAL ENCOUNTER (OUTPATIENT)
Dept: ULTRASOUND IMAGING | Facility: CLINIC | Age: 35
End: 2025-01-30
Attending: OBSTETRICS & GYNECOLOGY
Payer: COMMERCIAL

## 2025-01-30 ENCOUNTER — OFFICE VISIT (OUTPATIENT)
Dept: MATERNAL FETAL MEDICINE | Facility: CLINIC | Age: 35
End: 2025-01-30
Attending: OBSTETRICS & GYNECOLOGY
Payer: COMMERCIAL

## 2025-01-30 DIAGNOSIS — O13.9 GESTATIONAL HYPERTENSION, ANTEPARTUM: ICD-10-CM

## 2025-01-30 DIAGNOSIS — O13.3 GESTATIONAL HYPERTENSION, THIRD TRIMESTER: Primary | ICD-10-CM

## 2025-01-30 DIAGNOSIS — Z91.89 AT HIGH RISK FOR VENOUS THROMBOEMBOLISM (VTE): ICD-10-CM

## 2025-01-30 DIAGNOSIS — O09.90 SUPERVISION OF HIGH RISK PREGNANCY, ANTEPARTUM: ICD-10-CM

## 2025-01-30 PROBLEM — O16.3 HYPERTENSION AFFECTING PREGNANCY IN THIRD TRIMESTER: Status: RESOLVED | Noted: 2025-01-26 | Resolved: 2025-01-30

## 2025-01-30 PROCEDURE — 76819 FETAL BIOPHYS PROFIL W/O NST: CPT

## 2025-01-30 NOTE — PATIENT INSTRUCTIONS
"\"We hope you had a positive experience and that you can definitely recommend [facility] to your family and friends. You ll be receiving a survey soon and we look forward to hearing your feedback\".    [facility]  - Contact information:  Appointment line and to get a hold of CNM in clinic Monday-Friday 8 am - 5 pm:  [phone].  There are some clinics with early start times (1st appointment 7:40 am) and others with evening hours (last appointment 6:20 pm).  Most are typically open from 8 am to 5 pm.    CNM on call answering service: [phone].  Specify your hospital of choice and leave a brief message for CNM;  will then page CNM who is on call at your specified hospital and you should receive a call back with 15 minutes.  Be sure that your ringer is audible and that you can accept blocked calls so that we can get back in touch with you! This number should be reserved for urgent needs if during the day, before 8 am, after 5 pm, weekends, holidays.    Contact the on-call CNM with warning signs, such as:  vaginal bleeding   Vaginal discharge and itching or pain and burning during urination  Leg/calf pain or swelling on one side  severe abdominal pain  nausea and vomiting more than 4-5 times a day, or if you are unable to keep anything down  fever more than 100.4 degrees F.     [employer]  After each of your visits you are welcome to check [place] Resume for your visit summary including education and links to information relevant to your pregnancy and/or well woman care.   Find the \"Visits\" tab at the top of the page, you will see a list of recent visits and for each visit a for link for \"View After Visit Summary.\" View of your After Visit Summary will allow you to read our recommendations from your visit, review any education provided, and link to websites with useful information.   If you have any questions or difficulty navigating "[employer] please feel free to " "contact us and we will do our best to direct you.  Meet the Midwives from [facility]  You are invited to an informational meet and greet with [facility]'s [facility] Certified Nurse-Midwives. Our free \"Meet the Midwives\" event is a great opportunity to learn about our midwives' philosophy and experience, the hospitals where we can assist with your birth, and answer questions you may have. Partners, friends, and family are welcome to attend. Currently, this is a virtual event.  Date  Last Thursday of every month at 7 pm.    Link to next (live) meeting  [email or web address]  To Join by Telephone (audio only) Call:   [phone] Phone Conference ID: [phone] #    Touring the Maternity Care Center  At this time we are offering a virtual tour of the Maternity Care Centers at both [facility] and [facility]:   [facility] Maternity Care:   [email or web address] Corn[facility] ERExecutive [email or web address]/[email or web address]/the-[email or web address]--[facility] Maternity Care:   [email or web address]    Scroll to the bottom of this hyperlink if the above link does not work      Postpartum Depression  The first weeks of caring for a  baby are more than a full-time job. Although it is often a happy time, your feelings and moods may not be what you expected. Many women experience  baby blues.  Here are some tips to help you understand when feelings of sadness are normal, and when you should call your health care provider.    What are the baby blues?  As many as 3 of every 4 women will have short periods of mood swings, crying, or feeling cranky or restless during the first weeks after birth. These feelings can be worse when you are tired or anxious. Women who have the baby blues often say they feel like crying but don t know why. Baby blues usually happen in the first or second week " postpartum (after you give birth) and last less than a week. If you are not sleeping, becoming more upset, don t feel like you can take care of your baby, or your sadness lasts 2 weeks or more, call your health care provider.    What is postpartum depression?  About one in every 5 women will develop depression during the first few months postpartum that may be mild, moderate, or severe. Women who have postpartum depression may have some of these symptoms:  Feeling guilty   Not able to enjoy your baby and feeling like you are not bonding with your baby    Not able to sleep, even when the baby is sleeping  Sleeping too much and feeling too tired to get out of bed  Feeling overwhelmed and not able to do what you need to during the day  Not able to concentrate  Don t feel like eating  Feeling like you are not normal or not yourself anymore  Not able to make decisions  Feeling like a failure as a mother  Feeling lonely or all alone  Thinking your baby might be better off without you  If you have any of these symptoms, call your health care provider!    Which symptoms of postpartum depression are dangerous?  Sometimes a woman with postpartum depression will have thoughts of harming herself or her baby. If you find yourself thinking about hurting yourself or your baby, call your health care provider immediately.    MOTHERHOOD: THE EARLY DAYS  You prepare for the birth of your baby for many months during pregnancy, and then the first months at home after your baby is born can be a quiet, gentle time of getting to know this new person who has come to live in your home. But for most women it is not all quiet or sweet. And for some women it is a very hard time.  What Can I Expect in the First Few Months After the Baby Comes?  New mothers and their families face many challenges in the first few months:  Your body and your hormones have to get back to normal.  You and the baby will be learning to breastfeed.  Babies only sleep a  few hours at a time. The entire family will have a hard time getting enough sleep.  You and your family need to learn how to parent this new family member.  If you have a partner, you have to figure out how to stay together as a couple and maybe even start to have sex again.  You may have to figure out how to keep from getting pregnant again right away.  You may need to return to work and find day care.    How Long Will it Take for My Body to Get Back to Normal?  Some changes will occur quickly. Others will not occur as quickly.  Your uterus, cervix, and vagina will all shrink to their nonpregnant size in about 2 weeks. Your vagina may be tender and dry for a few months--especially if you are breastfeeding.  If you had stitches or hemorrhoids, your   bottom   will be sore for 2 weeks or more.  For some women who have problems urinating, it can take several months for you to be able to hold your urine when you cough or sneeze or suddenly  something heavy.  Your breast milk will   come in   2 to 3 days after the birth of your baby. It will take 6 to 8 weeks for you and the baby to get the hang of breastfeeding and find a pattern. During these first weeks, you can have engorged breasts at times and often leak milk.  Your stomach and intestines all have to fall back into place. You may have a lot of gas for a few weeks.  You may be constipated--especially if you are breastfeeding.  Your stretched stomach muscles can recover in a few weeks, but for some women it takes longer--6 months or a year--to recover.  If you had a  delivery, you may have pain or numbness around the incision for 6 months or more.  Losing the weight you gained during pregnancy will probably take 6 months to a year. Have patience! It took 40 weeks to get here. Give yourself 40 weeks to get back.    What Can I Expect When My Hormones Change?  About 75% of all women will get the   blues.   This usually starts about 3 days after the birth  of your baby. You may cry easily and feel very, very tired. A few women become very depressed. If you had a  delivery or your new baby was sick, you are at a higher risk for depression.  Call your health care provider right away if you cannot care for yourself or your baby, if you feel very nervous or worried, if you cannot stop crying, or if you are having thoughts of hurting yourself or your baby.    Taking Care of Yourself  While you are still pregnant:  Talk with your partner and your family about the time ahead. Arrange for someone to help you during the first weeks at home if you can.  Talk with your health care provider about birth control options and make a plan before the baby comes.  If you are worried about how to parent a , take parenting classes. You will learn a lot about how babies act and you will make some friends who are going through the same thing at the same time. Most [facility] have these classes.  Arrange for someone to help with baby care if you can.  After the baby comes:  Ask for help. Let other people do the cooking and cleaning and run the house. Focus on yourself and your baby.  Sleep whenever you can. Try not to be tempted to   get some things done   when the baby sleeps. This is your time to sleep, too.  Drink lots of water. You will need at least 6 big glasses of water everyday to avoid constipation and make enough breast milk. Every time you sit down to breastfeed, have a big glass of water with you to drink while you are nursing.  Eat lots of vegetables and fruit. You will need lots of vitamins and fiber to help your body get back to normal. This will also help you avoid constipation.  Go outside and walk. Babies can go outside even if it is very cold. Fresh air and sunshine will do you both good.  Take sitz baths. Put about 6 inches of warm water in your bathtub and sit in there for 15 minutes 2 to 3 times a day. This will help your   bottom   heal more quickly. It  will also give you 15 minutes of private time!  Talk to other mothers. Join a new parents group. Call Anne and go to Community Health meetings if you are breastfeeding.     With your partner:  Keep talking. Share the experience.  Spend time alone. Even a 30-minute walk can be a date.  Start a birth control method. You can get pregnant before you even have a period. It is very important to use birth control if you do not want to get pregnant again right away.  When you have sex, use a lubricant. A lot of lubricant! Take it slow.  The first few months after a baby comes can be a lot like floating in a jar of honey--very sweet and mir, but very sticky, too. Take time to enjoy the good parts. Remind yourself that this time will pass. Bon voyage!    FOR MORE INFORMATION  For questions about depression during and after pregnancy:  http://www.womenshealth.gov/publications/our-publications/fact-sheet/depression-pregnancy.html   After birth: The first 6 weeks:  http://www.VIVA/Post-Birth-and-Recovery   Breastfeeding resources:  http://www.[employer] (P).org/health-info/getting-breastfeeding-off-to-a-good-start/    Preparing for your baby:       Car Seat Clinics:  https://dps.[place].gov/divisions/ots/child-passenger-safety/Pages/car-seat-checks.aspx    [place] Safety [place]: http://www.[place]afetycouncil.org/family/carseatindex.cfm    Child Passenger Safety: Buckle Up Right    When child safety seats and safety belts are used correctly, they can reduce the risk of death by up to 70%. But finding the right combination can be confusing.  How do you know if you should be using an infant-only seat or a convertible seat?  What are booster seats and why do kids need them until they're eight years old or are four feet nine inches tall?  How do you know when a child is ready for your car's safety belt/shoulder strap?  The American Academy of Pediatrics (AAP) recommends that all infants and toddlers should ride  in a Rear-Facing Car Safety Seat until they are two (2) years of age or until they reach the highest weight or height allowed by their car safety seat's .    A child who is both under age 8 and shorter than 4 feet 9 inches is required to be fastened in a child safety seat that meets federal safety standards. Under this law, a child cannot use a seat belt alone until they are age 8, or 4 feet 9 inches tall. It is recommended to keep a child in a booster based on their height rather than their age. Check the instruction book or label of the child safety seat to be sure it is the right seat for your child's weight and height.    www.CarSeatsMadeSimple.org    Car Seat Recycling, -    Get free expert help in a [place] community near you:  [place] Child Passenger Safety Checkup Clinic Calendar    How to Find the Right Car Seat (NHTSA)  Safe Kids [place]    Print Materials  Basic Car Seat Safety checklist  Don't Skip a Step brochure (English); (Persian); (Ukrainian)  Good Going! Adventures in Safety magazine  Fact Sheets  Booster Seat Safety  Outdated and Used Child Safety Seats  A Parents' Checklist: Traffic Safety  Driving Your Child to School  Occupant Protection (Safety Belts and Child Safety Seats): Frequently Asked Questions; Misconceptions and Myths; [place] Laws  Air Bags: How Do Air Bags Work; Frequently Asked Questions      Immunizations:  http://www.cdc.gov/vaccines/schedules/easy-to-read/child.html    [place] Screening Program  Http://www.health.[facility].mn.us/newbornscreening/  [place] newborns are tested soon after birth for more than 50 hidden, rare disorders, including hearing loss and critical congenital heart disease (CCHD). This site provides resources and information for families and providers.    Ask your health care provider about vaccinations you may need following delivery. By now, you should have received a Tdap immunization to protect against pertussis or whooping cough.  Fathers and family members who will be in close contact with the baby should also receive a Tdap shot at least two weeks before the expected birth of the baby if they have not had a Td (tetanus) shot for at least two years.    Your midwife may offer to check your cervix for changes. If you are past your due date, discuss the next steps leading to delivery with your midwife. If you don't start labor on your own by 41 or 42 weeks, your midwife may recommend giving you medicines to ripen your cervix and start labor.  Induction of labor: http://onlinelibrary.iglesias.com/store/10.1016/j.jmwh.2008.04.018/asset/j.jmwh.2008.04.018.pdf?v=1&t=lffz3ufg&e=[email or web address]    Tell your midwife or physician how you plan to feed your baby (breast or bottle), who you have chosen to do pediatric care for your baby, and if you have a boy, whether you have chosen to have him circumcised. You will need a car seat correctly installed in your vehicle to bring your baby home. As you start to set up the nursery at home for your baby, make sure the crib is safe. The mattress needs to fit snugly against the edges of the crib. If you can fit a soda can between the bars, they are too far apart and can allow the baby's head to caught between them.    Learn about infant care and feeding, including information about infant CPR. We recommend that you put your baby to sleep on his or her back to reduce the chance of Sudden Infant Death Syndrome (SIDS). To maintain a healthy environment in which your child can grow, it's best to keep your home smoke-free. By preparing ahead, your transition into parenthood will go smoothly for you and your baby.    Your midwife will want to see you for a checkup 2 to 6 weeks after delivery.      Making Plans for Feeding My Baby    By this point, you probably have read a lot about feeding your baby.  Breastfeed or formula? Each mother s decision is her own and [facility]  [facility] respects you and your choices. We ve gathered information on both breastfeeding and formula feeding to help with your decision. Talking with your physician or nurse-midwife can also help in your decision.  However you plan to feed your baby, [facility] encourage rooming in with your baby, skin-to-skin contact and feeding your baby based on his or her cues.    Skin-to-skin contact  Being close to mom helps your baby adjust to life outside of the womb.  It helps your baby regulate their body temperature, heart rate, and breathing.  Your baby will usually be placed skin-to-skin immediately following birth or as soon as possible, if medical intervention is needed.    Rooming-In  Having your baby stay with you in your room is called  rooming-in .  Keeping your baby in your room helps you to learn how to care for your baby by getting to know your baby s cues, body rhythms and sleep cycle.       Cue-based feeding  Cues (signals) are baby s way of telling you what he or she wants.  When you learn your infant s cues, you know how to care for and feed your baby.   Feeding cues are the licking and smacking of lips, bringing their fist to their mouth, and a reflex called  rooting - where baby turns and opens his or her mouth, searching for the breast or bottle.  Crying is a late feeding cue.  Babies can feed frequently, often at least 8 times in 24 hours.  Breastfeeding facts  Breast milk is the best source of nutrition for your baby and is available at birth. In the first couple of days, your milk volume is already starting to increase, though it may not be noticeable. Breastfeed frequently to increase your milk supply. Within three to five days, you will begin to notice larger milk volumes. An increase in breast size, heaviness and firmness are often described as the milk  coming in.  Frequent breastfeeding can help breasts from getting overly firm and painful. You will know the baby  is getting enough milk if your baby is having wet and dirty diapers and gaining weight.     If your goal is to exclusively breastfeed, it is important to not use any formula or artificial nipples (including bottles and pacifiers) while your baby is learning to breastfeed.  While it may seem like an  easy  option to give your baby a bottle, formula should only be given if there is a medical reason for your baby to have it.    Positioning and attachment   Get comfortable.  Use pillows as needed to support your arms and baby.  Hold baby close at the level of your breast, facing you in a tummy to tummy position.  Skin to skin helps with this.  Position the baby with his or her nose by the nipple.  There should be a straight line from baby s ear to shoulder to hips.  Tickle your baby s lips or wait for baby to open mouth wide, bring baby to breast by leading with the chin.  Aim the nipple at the roof of baby s mouth.  A rapid sucking pattern is followed by longer, drawing pattern with occasional swallows heard.  When baby is correctly latched, your nipple and much of the areola are pulled well into baby s mouth.      Returning to work or school  Focus on a good start to breastfeeding.  Many women continue to provide breastmilk for their baby when they return to work or school.  Making plans about where to pump and store milk can make the transition go well.  Talk with other mothers who have also returned to work or school for tips and support.  Your employer s Human Resource department may be a resource as well.     Returning to work or school: (continued)   babies can mean fewer  sick  days for you.  A quality breast pump will also save time and add comfort.  Check with your insurance prior to giving birth for breast pump coverage.  Many insurance companies include a pump within your benefits.  Wait until your baby is at least three weeks old to introduce a bottle for the first time.  Have someone besides you  give the bottle.  Breastfeed when you are with your baby. Reserve your bottles of breast milk for when you are away.   Your breasts will need to be  emptied  either by your baby or a pump.  Plan to pump at least twice in an eight hour day.  If you cannot pump at work, continue breastfeeding at home. Any amount of breast milk is worth giving to your baby.    Formula feeding facts  If you are planning to use formula to feed your baby, you will want to make some preparations ahead of time. Talk to your doctor or nurse-midwife about what type of formula to use. Some are iron-fortified, meaning they have extra iron in them. You will want to purchase formula and bottles before your baby is born to be sure you are ready after you return from the hospital. The [facility] do not provide formula samples to take home.    Be sure to follow formula mixing directions closely. Regular milk in the dairy case at the grocery store should not be given to babies under 1 year old. Baby formula is sold in several forms including:  Ready-to-use. This is the most expensive, but no mixing is necessary.  Concentrated liquid. This is less expensive than ready-to-use and you mix with water.  Powder. This is the least expensive. You mix one level scoop of powdered formula with two ounces of water and stir well.    Most babies need 2.5 ounces of formula per pound of body weight each day. This means an 8-pound baby may drink about 20 ounces of formula a day; however, this is just an estimate. The most important thing is to pay attention to your baby s cues.  If your baby is always fussy, needs more iron or has certain food allergies, your physician may suggest you change your baby s formula to a different kind.     How do I warm my baby s bottles?  You may feed your baby a bottle without warming it first. It is OK for the breast milk or formula to be cool or room temperature. If your baby seems to prefer it warmed, you can put the  filled bottle in a container of warm water and let it stand for a few minutes. Check the temperature of the liquid on your skin before feeding it to your baby; to be sure it isn t too hot. Do not heat bottles in the microwave. Microwaves heat food and liquids unevenly, and this can cause hot spots that can burn your baby.    How do I clean and sterilize bottles?  Sterilize bottles and nipples before you use them for the first time. You can do this by putting them in boiling water for 5 minutes. After that first time, you can wash them in hot and soapy water. Rinse them carefully to be sure there is no soap left on them. You can also wash them in the .    Childbirth and Parenting Education:       Everyday Miracles:   https://www.everyday-miracles.org/    Free Video Series from [facility]: https://nursing.[facility]/academics/specialty-areas/nurse-midwifery/having-baby-prenatal-videos/having-baby-prenatal-and    Childbirth Education virtual (live) classes: www.Serveron/classes  The Birth Hour: https://Sxbbm/online-childbirth-class/  BirthED: https://www.birthedmn.com/  JOSSY parenting center: http://[facility] HospitalRediMetrics/   (291) 635-BABY  [place]: (education, yoga & wellness) www.WhatClinic.com  Enlightened Mama: www.enlightenedmama.FEMA Guides   Childbirth collective: (Parent topic nights)  www.childbirthcollective.org/  Hypnobabies:  www.hypnobabiestwincities.com/  Hypnobirthing:  Http://hypnobirthing.FEMA Guides/  Hypnobirthing virtual class: www.Veodin/hypnobirthing    Information about doulas:  Childbirth collective: http://www.childbirthcollective.org/  Doulas of [place] (DONNIE):  www.donnie.org  [facility]  project: http://[employer] Materialstiesdoulaproject.com/     Early Childhood and Family Education (ECFE):  ECFE offers parents hands-on learning experiences that will nourish a lifetime of teachable moments.  http://ecfe.info/ecfe-home/    APPS and Podcasts:   Ovia  Glow  Nurture    Evidence Based Birth  The Birth Hour (for birth stories)   Birthful   Expectful   The Longest Shortest Time  PregnancyPodcast Jessica Rojas  https://www.downtobirthshow.com/    Book Recommendations:   Caroline Abi's Birthing From Within--first few chapters include a new-age tone, you may prefer to skip it and keep going, because there is good stuff later.  This book recommendation covers emotional preparation, but does cover coping with pain, and use of both pharmacological and nonpharmacological methods.      Guide to Childbirth by Jeri Roe  Childbirth Without Fear by Rob Cody Read    Dr. Sanchez' The Pregnancy Book and The Birth Book--the pregnancy book goes month-by month    The Birth Partner by Anisa Malik    Womanly Art of Breastfeeding by La Leche League International   Bestfeeding by Laney Israel--great pictures    Mothering Your Nursing Toddler, by Faviola Riggs.   Addresses dealing with so many of the challenging behaviors of a nursing toddler.  How Weaning Happens, by La Leche League.  Discusses weaning at all ages, from medically necessary weaning of an infant, all the way up to age 5 (or older), with why/why not, and strategies.  Very empowering book both for deciding to wean and deciding not to.    American College of Nurse-Midwives (ACNM) http://www.midwife.org/; look at the informational handouts at http://www.midwife.org/Share-With-Women     www.mymidwife.org    Mother to Baby (Medication and Herbal guidance in pregnancy): http://www.mothertobaby.org  Toll-Free Hotline: 561.639.2539  LactMed (Medication use while breastfeeding): http://toxnet.nlm.nih.gov/newtoxnet/lactmed.htm    Women's Health.gov:  http://www.womenshealth.gov/a-z-topics/index.html    American pregnancy association - http://americanpregnancy.org    Centering Pregnancy (group prenatal care option): http://centeringhealthcare.org    March of Dimes www.Sush.io.[employer]     FDA - Nutrition  www.mypyramid.gov   "Under \"For Consumers,\" click on \"pregnant and breastfeeding women.\"      Centers for Disease Control and Prevention (CDC) - Vaccines : http://www.cdc.gov/vaccines/       When researching information on the web, question the validity of websites.  The domains .gov, .edu and.org tend to be more reliable information.  If there are a lot of advertisements, be cautious of the information provided. Stay away from blogs and chat rooms please!     [facility] Breastfeeding Support    Early Childhood Family [place] [address] provides a free, drop-in class/breastfeeding support group, facilitated by a lactation consultant and .  During the group you can connect with other parents, weigh your baby, ask questions about feeding and baby development, and more.  You do not need to register.  Fall in-person meetings will begin on , are for parents of babies from birth to 9 months, and will meet on Monday evenings from 6 - 7:15 pm in [facility], which is at [address] [place] [address].  [place] [address] also offers virtual group meetings with a lactation consultant/.  These take place on , from 11:30 am - 12:30 pm for parents of newborns to 3-month-olds, and from 4:15 - 5:15 for parents of 3 - 9 - month olds.  To get the meeting link contact Nereida Calderon at [phone].    [facility] offers a free, drop-in breastfeeding support group facilitated by MARISSA Manuel.   at [place] [facility] [address], [place].  A scale is available to check baby weights, if desired.  [place] also has a variety of new parent classes:  (cost for registration)  https://[facility].HungerTime/classes/    [facility] Lactation unge facilitated by MARISSA Blackwood:  Free, drop-in support group for breastfeeding, with baby scale available if needed.  Meets at [facility], second Tuesday of each month, 10 am - 12 pm.  Text [phone] " "for info.    Lat Cafe Support Group, Tuesdays at 10:30 am   Run by Tracy Varela, MARISSA of [facility]act[facility]   Go to [facility] Facebook page, click on \"events\" for link:   Https://[email or web address]/    [facility] breastfeeding support community:  free, drop-in breastfeeding support facilitated by Certified Lactation Counselors, open Mondays and Wednesdays from 1 pm - 5 pm.  In [place]:  [facility] [place], [facility] [facility]:   [facility].org    [facility] Milk Hour, Thursdays at 2:30 pm    Run by Yoan Alejo, JOSE ELIASCLC  Go to [facility] FB page and send message to get link   Https://www.Edsby.com/healthfoundations/    Princess Rothman:  http://www.londas.org/    [employer] offers a Lactation Lounge every Friday 12pm - 1pm, run by Marci Gan, Princess Rothman Leader.  Sign up via link at [employer]a/cbe-lactation   https://www.Oppa/cbe-lactation    [facility] is offering virtual support groups every Monday, 10:30 am - 12 pm, run by RN IBCLC: Https://www.Edsby.[email or web address]/    Culturally-Specific Breastfeeding Support:     For Hmong Families:   The Hmong Breastfeeding Coalition is a wonderful support for [place] Hmong women who are breastfeeding.  They are best found on Facebook.    for Black families:    Chocolate Milk Club:  http://www.[employer].com/chocolate-milk-club/  Email: Jasson@[employer]    R.O.S.E. = Reaching our Sisters Everywhere  Http://www.breastfeedingrose.org/    Club Mom breastfeeding support for Black mothers:  Contact Diana Mak  Phone: [phone]   Email:  [email or web address].     Kay Suarez  Phone: [phone]   Email:  [email or web address].    Club Dad parent support for Black fathers:   Contact Hamzah López   Phone: [phone]   Email:  " "[email or web address].    For Native/Indigenous Families:    https://www.Calera.com/groups/nitchely.gimashkikinaan     Additional Resources:  La Leche AppSurferaxel is an international, nonprofit, nonsectarian organization offering information, education, and support to mothers who want to breast-feed their babies. Local groups offer phone help and monthly meetings. Visit TSCA.org or Swapferit.org and us the  Find local support  drop down menu or click on the  Resources  tab.    [place] Breastfeeding Resources: 0-794-714-BABY (2229) toll free    National Breastfeeding Help Line trained breastfeeding peer counselors can help answer common breast-feeding questions by phone. Monday-Friday: English/Bengali  [phone]- [phone] toll free, [phone] (TTY)    Virtual Breastfeeding Support:    During this time of isolation, breastfeeding families need even more community!  Here are some area organizations offering virtual support groups for breastfeeding:    [facility] Cafe Support Group, Tuesdays at 10:30 am   Run by MARISSA Pacheco of [employer] [facility]   Go to [facility] Facebook page and click on \"events\" for link   https://[email or web address]/  [facility] Milk Hour, Thursdays at 2:30 pm    Run by MARISSA Gutierrez   Go to [facility] FB page and send message to get link   https://www.Calera.com/healthfoundations/  [facility]/[place] holding virtual meetings the first Tuesday of each month, 8-9 pm, and the   Third Saturday, 10 - 11 am.  Go to Count includes the [facility] FB page; message to get link [email or web address]://www.Calera.com/Reyna/?hc_location=[facility]  [employer] offers a Lactation Lounge every Friday 12pm - 1pm, run by [facility] Leader   Sign up via link at https://www.[employer]/cbe-lactation  [place] " [facility] is offering virtual support groups every Monday, 10:30 am - 12 pm, run by nurse MARISSA   Https://www.[email or web address]/events/432171030262993/    Prenatal Breastfeeding Classes:      [employer] is offering virtual breastfeeding and  care classes:  https://www.[employer]/education-workshops  Birth[employer] childbirth and breastfeeding education offering virtual prenatal breastfeeding classes  https://www.birthedmn.com/workshops

## 2025-01-30 NOTE — NURSING NOTE
Patient reports positive fetal movement, no pain, no contractions, leaking of fluid, or bleeding.  Reports blood pressures are within range.  Patient denies headache, visual changes, nausea/vomiting, epigastric pain related to preeclampsia.  Education provided to patient on today's ultrasound.  SBAR given to YANDEL MADDEN, see their note in Epic.

## 2025-02-02 ASSESSMENT — EDINBURGH POSTNATAL DEPRESSION SCALE (EPDS)
I HAVE BEEN SO UNHAPPY THAT I HAVE HAD DIFFICULTY SLEEPING: NOT VERY OFTEN
I HAVE LOOKED FORWARD WITH ENJOYMENT TO THINGS: AS MUCH AS I EVER DID
THE THOUGHT OF HARMING MYSELF HAS OCCURRED TO ME: NEVER
I HAVE BEEN ANXIOUS OR WORRIED FOR NO GOOD REASON: YES, SOMETIMES
I HAVE FELT SCARED OR PANICKY FOR NO GOOD REASON: YES, SOMETIMES
I HAVE FELT SAD OR MISERABLE: NOT VERY OFTEN
I HAVE BLAMED MYSELF UNNECESSARILY WHEN THINGS WENT WRONG: YES, SOME OF THE TIME
I HAVE BEEN ABLE TO LAUGH AND SEE THE FUNNY SIDE OF THINGS: AS MUCH AS I ALWAYS COULD
THINGS HAVE BEEN GETTING ON TOP OF ME: YES, SOMETIMES I HAVEN'T BEEN COPING AS WELL AS USUAL
I HAVE BEEN SO UNHAPPY THAT I HAVE BEEN CRYING: YES, QUITE OFTEN
TOTAL SCORE: 12

## 2025-02-03 ENCOUNTER — OFFICE VISIT (OUTPATIENT)
Dept: MATERNAL FETAL MEDICINE | Facility: HOSPITAL | Age: 35
End: 2025-02-03
Attending: STUDENT IN AN ORGANIZED HEALTH CARE EDUCATION/TRAINING PROGRAM
Payer: COMMERCIAL

## 2025-02-03 ENCOUNTER — ANCILLARY PROCEDURE (OUTPATIENT)
Dept: ULTRASOUND IMAGING | Facility: HOSPITAL | Age: 35
End: 2025-02-03
Attending: STUDENT IN AN ORGANIZED HEALTH CARE EDUCATION/TRAINING PROGRAM
Payer: COMMERCIAL

## 2025-02-03 ENCOUNTER — PRENATAL OFFICE VISIT (OUTPATIENT)
Dept: MIDWIFE SERVICES | Facility: CLINIC | Age: 35
End: 2025-02-03
Payer: COMMERCIAL

## 2025-02-03 VITALS
BODY MASS INDEX: 43.79 KG/M2 | WEIGHT: 272.5 LBS | HEIGHT: 66 IN | HEART RATE: 90 BPM | OXYGEN SATURATION: 98 % | DIASTOLIC BLOOD PRESSURE: 90 MMHG | SYSTOLIC BLOOD PRESSURE: 124 MMHG

## 2025-02-03 DIAGNOSIS — Z91.89 AT HIGH RISK FOR VENOUS THROMBOEMBOLISM (VTE): ICD-10-CM

## 2025-02-03 DIAGNOSIS — O09.90 SUPERVISION OF HIGH RISK PREGNANCY, ANTEPARTUM: Primary | ICD-10-CM

## 2025-02-03 DIAGNOSIS — R74.8 ELEVATED LIVER ENZYMES: ICD-10-CM

## 2025-02-03 DIAGNOSIS — O09.90 SUPERVISION OF HIGH RISK PREGNANCY, ANTEPARTUM: ICD-10-CM

## 2025-02-03 DIAGNOSIS — O13.9 GESTATIONAL HYPERTENSION, ANTEPARTUM: Primary | ICD-10-CM

## 2025-02-03 DIAGNOSIS — O13.3 GESTATIONAL HYPERTENSION, THIRD TRIMESTER: ICD-10-CM

## 2025-02-03 DIAGNOSIS — O13.9 GESTATIONAL HYPERTENSION, ANTEPARTUM: ICD-10-CM

## 2025-02-03 PROBLEM — Z36.89 ENCOUNTER FOR TRIAGE IN PREGNANT PATIENT: Status: RESOLVED | Noted: 2025-01-26 | Resolved: 2025-02-03

## 2025-02-03 PROBLEM — O99.019 ANEMIA DURING PREGNANCY: Status: ACTIVE | Noted: 2025-02-03

## 2025-02-03 LAB
ALBUMIN MFR UR ELPH: 12.2 MG/DL
ALBUMIN SERPL-MCNC: 2.7 G/DL (ref 3.4–5)
ALP SERPL-CCNC: 91 U/L (ref 40–150)
ALT SERPL W P-5'-P-CCNC: 56 U/L (ref 0–50)
ANION GAP SERPL CALCULATED.3IONS-SCNC: 8 MMOL/L (ref 3–14)
AST SERPL W P-5'-P-CCNC: 31 U/L (ref 0–45)
BILIRUB SERPL-MCNC: 0.6 MG/DL (ref 0.2–1.3)
BUN SERPL-MCNC: 6 MG/DL (ref 7–30)
CALCIUM SERPL-MCNC: 9.2 MG/DL (ref 8.5–10.1)
CHLORIDE BLD-SCNC: 107 MMOL/L (ref 94–109)
CO2 SERPL-SCNC: 27 MMOL/L (ref 20–32)
CREAT SERPL-MCNC: 1 MG/DL (ref 0.52–1.04)
CREAT UR-MCNC: 88.5 MG/DL
EGFRCR SERPLBLD CKD-EPI 2021: 75 ML/MIN/1.73M2
ERYTHROCYTE [DISTWIDTH] IN BLOOD BY AUTOMATED COUNT: 13.1 % (ref 10–15)
GLUCOSE BLD-MCNC: 96 MG/DL (ref 70–99)
HCT VFR BLD AUTO: 28.7 % (ref 35–47)
HGB BLD-MCNC: 10.1 G/DL (ref 11.7–15.7)
MCH RBC QN AUTO: 32.4 PG (ref 26.5–33)
MCHC RBC AUTO-ENTMCNC: 35.2 G/DL (ref 31.5–36.5)
MCV RBC AUTO: 92 FL (ref 78–100)
PLATELET # BLD AUTO: 208 10E3/UL (ref 150–450)
POTASSIUM BLD-SCNC: 3.8 MMOL/L (ref 3.4–5.3)
PROT SERPL-MCNC: 6.4 G/DL (ref 6.8–8.8)
PROT/CREAT 24H UR: 0.14 MG/MG CR (ref 0–0.2)
RBC # BLD AUTO: 3.12 10E6/UL (ref 3.8–5.2)
SODIUM SERPL-SCNC: 142 MMOL/L (ref 135–145)
WBC # BLD AUTO: 10.1 10E3/UL (ref 4–11)

## 2025-02-03 PROCEDURE — 84156 ASSAY OF PROTEIN URINE: CPT | Performed by: ADVANCED PRACTICE MIDWIFE

## 2025-02-03 PROCEDURE — 80053 COMPREHEN METABOLIC PANEL: CPT | Performed by: ADVANCED PRACTICE MIDWIFE

## 2025-02-03 PROCEDURE — 99207 PR PRENATAL VISIT: CPT | Performed by: ADVANCED PRACTICE MIDWIFE

## 2025-02-03 PROCEDURE — 76819 FETAL BIOPHYS PROFIL W/O NST: CPT

## 2025-02-03 PROCEDURE — 36415 COLL VENOUS BLD VENIPUNCTURE: CPT | Performed by: ADVANCED PRACTICE MIDWIFE

## 2025-02-03 PROCEDURE — 76819 FETAL BIOPHYS PROFIL W/O NST: CPT | Mod: 26 | Performed by: STUDENT IN AN ORGANIZED HEALTH CARE EDUCATION/TRAINING PROGRAM

## 2025-02-03 PROCEDURE — 85027 COMPLETE CBC AUTOMATED: CPT | Performed by: ADVANCED PRACTICE MIDWIFE

## 2025-02-03 NOTE — PROGRESS NOTES
Sourav is here with  a routine prenatal visit. Reports normal fetal movements. Denies regular painful contractions, bleeding, leaking, changes in fetal movement. Offers no questions or concerns.  She denies headache, dizziness, blurry vision, epigastric pain, nausea, vomiting.  Has been taking blood pressures at home, denies severe range BP.     Dx at 33 wks: [facility] Recommendations-   surveillance with twice weekly BPP (scheduled w/ [facility])   Weekly assessment of preeclampsia labs and close monitoring of blood pressures (she is scheduled with CNMs)  Delivery is recommended at 37w 0d if remains clinically stable, with earlier delivery if there is progression to preeclampsia with severe features.  (Plan to schedule next week)    EPDS today: , 0 to #10.  Handouts given regarding GBS, breast-feeding and postpartum depression/anxiety and wellbeing.     Labs today:  Orders Placed This Encounter   Procedures    CBC with Platelets    Comprehensive metabolic panel    Protein  random urine      Continue with care plan as above and urged to call on-call CNM with any severe range blood pressures at home, or any signs or symptoms of preeclampsia as reviewed. Reviewed counseling  labor precautions, warning signs and symptoms, and when/how to contact the on-call CNM. RTC 2 wks.       Addendum:    -Due to ALT elevation, recommended repeat CNM visit with BP check and repeat HELLP labs later this week. Pt scheduled for 25 with RACHEL Mcqueen.  TO continue with twice weekly  surveillance at [facility] and BID home BP monitoring.     Component      Latest Ref Rng 2/3/2025  9:34 AM 2/3/2025  9:53 AM   Sodium      135 - 145 mmol/L 142     Potassium      3.4 - 5.3 mmol/L 3.8     Chloride      94 - 109 mmol/L 107     Carbon Dioxide      20 - 32 mmol/L 27     Anion Gap      3 - 14 mmol/L 8     Urea Nitrogen      7 - 30 mg/dL 6 (L)     Creatinine      0.52 - 1.04 mg/dL 1.00     GFR Estimate      >60 mL/min/1.73m2 75      Calcium      8.5 - 10.1 mg/dL 9.2     Glucose      70 - 99 mg/dL 96     Alkaline Phosphatase      40 - 150 U/L 91     AST      0 - 45 U/L 31     ALT      0 - 50 U/L 56 (H)     Protein Total      6.8 - 8.8 g/dL 6.4 (L)     Albumin      3.4 - 5.0 g/dL 2.7 (L)     Bilirubin Total      0.2 - 1.3 mg/dL 0.6     WBC      4.0 - 11.0 10e3/uL 10.1     RBC Count      3.80 - 5.20 10e6/uL 3.12 (L)     Hemoglobin      11.7 - 15.7 g/dL 10.1 (L)     Hematocrit      35.0 - 47.0 % 28.7 (L)     MCV      78 - 100 fL 92     MCH      26.5 - 33.0 pg 32.4     MCHC      31.5 - 36.5 g/dL 35.2     RDW      10.0 - 15.0 % 13.1     Platelet Count      150 - 450 10e3/uL 208     Total Protein Urine mg/dL        mg/dL  12.2    Total Protein Urine mg/mg Creat      0.00 - 0.20 mg/mg Cr  0.14    Creatinine Urine      mg/dL  88.5       Legend:  (L) Low  (H) High

## 2025-02-03 NOTE — NURSING NOTE
Sourav Mascorro is a  at 34w6d who presents to [facility] for scheduled BPP. Pt reports positive fetal movement. Pt denies bldg/lof/change in discharge, contractions, headache, vision changes, chest pain/SOB or edema. Pt reports stable blood pressures at home-checking BID (130-140s/80-90s) SBAR given to Dr. YVES Schuster, see note in Epic.

## 2025-02-05 ENCOUNTER — HOSPITAL ENCOUNTER (OUTPATIENT)
Facility: HOSPITAL | Age: 35
Setting detail: OBSERVATION
End: 2025-02-05
Attending: ADVANCED PRACTICE MIDWIFE | Admitting: OBSTETRICS & GYNECOLOGY
Payer: COMMERCIAL

## 2025-02-05 ENCOUNTER — TELEPHONE (OUTPATIENT)
Dept: MIDWIFE SERVICES | Facility: CLINIC | Age: 35
End: 2025-02-05
Payer: COMMERCIAL

## 2025-02-05 DIAGNOSIS — Z91.89 AT RISK FOR COMPLICATION OF PREGNANCY: Primary | ICD-10-CM

## 2025-02-05 DIAGNOSIS — O09.90 SUPERVISION OF HIGH RISK PREGNANCY, ANTEPARTUM: ICD-10-CM

## 2025-02-05 DIAGNOSIS — Z91.89 AT HIGH RISK FOR VENOUS THROMBOEMBOLISM (VTE): ICD-10-CM

## 2025-02-05 DIAGNOSIS — O13.3 GESTATIONAL HYPERTENSION, THIRD TRIMESTER: Primary | ICD-10-CM

## 2025-02-05 DIAGNOSIS — Z98.891 S/P CESAREAN SECTION: ICD-10-CM

## 2025-02-05 DIAGNOSIS — O26.899 RH NEGATIVE STATE IN ANTEPARTUM PERIOD: ICD-10-CM

## 2025-02-05 DIAGNOSIS — O99.019 ANEMIA DURING PREGNANCY: ICD-10-CM

## 2025-02-05 DIAGNOSIS — Z67.91 RH NEGATIVE STATE IN ANTEPARTUM PERIOD: ICD-10-CM

## 2025-02-05 DIAGNOSIS — O13.3 GESTATIONAL HYPERTENSION, THIRD TRIMESTER: ICD-10-CM

## 2025-02-05 DIAGNOSIS — E87.6 HYPOKALEMIA: ICD-10-CM

## 2025-02-05 PROBLEM — H57.9 EYE PRESSURE: Status: ACTIVE | Noted: 2025-02-05

## 2025-02-05 PROBLEM — R10.11 RIGHT UPPER QUADRANT PAIN: Status: ACTIVE | Noted: 2025-02-05

## 2025-02-05 LAB
ALBUMIN MFR UR ELPH: <6 MG/DL
ALBUMIN SERPL BCG-MCNC: 3.7 G/DL (ref 3.5–5.2)
ALP SERPL-CCNC: 122 U/L (ref 40–150)
ALT SERPL W P-5'-P-CCNC: 53 U/L (ref 0–50)
ANION GAP SERPL CALCULATED.3IONS-SCNC: 14 MMOL/L (ref 7–15)
AST SERPL W P-5'-P-CCNC: 30 U/L (ref 0–45)
BILIRUB SERPL-MCNC: 0.2 MG/DL
BUN SERPL-MCNC: 9.1 MG/DL (ref 6–20)
CALCIUM SERPL-MCNC: 9.2 MG/DL (ref 8.8–10.4)
CHLORIDE SERPL-SCNC: 102 MMOL/L (ref 98–107)
CREAT SERPL-MCNC: 0.77 MG/DL (ref 0.51–0.95)
CREAT UR-MCNC: 17 MG/DL
EGFRCR SERPLBLD CKD-EPI 2021: >90 ML/MIN/1.73M2
ERYTHROCYTE [DISTWIDTH] IN BLOOD BY AUTOMATED COUNT: 13 % (ref 10–15)
GLUCOSE SERPL-MCNC: 89 MG/DL (ref 70–99)
HCO3 SERPL-SCNC: 21 MMOL/L (ref 22–29)
HCT VFR BLD AUTO: 29.5 % (ref 35–47)
HGB BLD-MCNC: 10 G/DL (ref 11.7–15.7)
HOLD SPECIMEN: NORMAL
MCH RBC QN AUTO: 30.6 PG (ref 26.5–33)
MCHC RBC AUTO-ENTMCNC: 33.9 G/DL (ref 31.5–36.5)
MCV RBC AUTO: 90 FL (ref 78–100)
PLATELET # BLD AUTO: 240 10E3/UL (ref 150–450)
POTASSIUM SERPL-SCNC: 3.9 MMOL/L (ref 3.4–5.3)
PROT SERPL-MCNC: 6.3 G/DL (ref 6.4–8.3)
PROT/CREAT 24H UR: NORMAL MG/G{CREAT}
RBC # BLD AUTO: 3.27 10E6/UL (ref 3.8–5.2)
SODIUM SERPL-SCNC: 137 MMOL/L (ref 135–145)
WBC # BLD AUTO: 12.4 10E3/UL (ref 4–11)

## 2025-02-05 PROCEDURE — 82565 ASSAY OF CREATININE: CPT | Performed by: ADVANCED PRACTICE MIDWIFE

## 2025-02-05 PROCEDURE — 250N000013 HC RX MED GY IP 250 OP 250 PS 637: Performed by: OBSTETRICS & GYNECOLOGY

## 2025-02-05 PROCEDURE — 84156 ASSAY OF PROTEIN URINE: CPT | Performed by: ADVANCED PRACTICE MIDWIFE

## 2025-02-05 PROCEDURE — G0379 DIRECT REFER HOSPITAL OBSERV: HCPCS

## 2025-02-05 PROCEDURE — 87653 STREP B DNA AMP PROBE: CPT | Performed by: OBSTETRICS & GYNECOLOGY

## 2025-02-05 PROCEDURE — 85027 COMPLETE CBC AUTOMATED: CPT | Performed by: ADVANCED PRACTICE MIDWIFE

## 2025-02-05 PROCEDURE — 86780 TREPONEMA PALLIDUM: CPT | Performed by: OBSTETRICS & GYNECOLOGY

## 2025-02-05 PROCEDURE — G0378 HOSPITAL OBSERVATION PER HR: HCPCS

## 2025-02-05 PROCEDURE — 36415 COLL VENOUS BLD VENIPUNCTURE: CPT | Performed by: ADVANCED PRACTICE MIDWIFE

## 2025-02-05 RX ORDER — HYDRALAZINE HYDROCHLORIDE 20 MG/ML
10 INJECTION INTRAMUSCULAR; INTRAVENOUS
Status: DISCONTINUED | OUTPATIENT
Start: 2025-02-05 | End: 2025-02-11 | Stop reason: HOSPADM

## 2025-02-05 RX ORDER — NIFEDIPINE 30 MG
30 TABLET, EXTENDED RELEASE ORAL DAILY
Status: DISCONTINUED | OUTPATIENT
Start: 2025-02-05 | End: 2025-02-08

## 2025-02-05 RX ORDER — LABETALOL HYDROCHLORIDE 5 MG/ML
20 INJECTION, SOLUTION INTRAVENOUS
Status: DISCONTINUED | OUTPATIENT
Start: 2025-02-05 | End: 2025-02-05

## 2025-02-05 RX ORDER — ASPIRIN 81 MG/1
81 TABLET, CHEWABLE ORAL DAILY
Status: DISCONTINUED | OUTPATIENT
Start: 2025-02-06 | End: 2025-02-08

## 2025-02-05 RX ORDER — LEVOTHYROXINE SODIUM 25 UG/1
25 TABLET ORAL
Status: DISCONTINUED | OUTPATIENT
Start: 2025-02-06 | End: 2025-02-11 | Stop reason: HOSPADM

## 2025-02-05 RX ORDER — ONDANSETRON 2 MG/ML
4 INJECTION INTRAMUSCULAR; INTRAVENOUS EVERY 6 HOURS PRN
Status: DISCONTINUED | OUTPATIENT
Start: 2025-02-05 | End: 2025-02-11 | Stop reason: HOSPADM

## 2025-02-05 RX ORDER — CALCIUM CARBONATE 500 MG/1
500 TABLET, CHEWABLE ORAL 4 TIMES DAILY PRN
Status: DISCONTINUED | OUTPATIENT
Start: 2025-02-05 | End: 2025-02-11 | Stop reason: HOSPADM

## 2025-02-05 RX ORDER — BUPROPION HYDROCHLORIDE 300 MG/1
300 TABLET ORAL EVERY MORNING
Status: DISCONTINUED | OUTPATIENT
Start: 2025-02-06 | End: 2025-02-11 | Stop reason: HOSPADM

## 2025-02-05 RX ORDER — LIDOCAINE 40 MG/G
CREAM TOPICAL
Status: DISCONTINUED | OUTPATIENT
Start: 2025-02-05 | End: 2025-02-06 | Stop reason: HOSPADM

## 2025-02-05 RX ORDER — NIFEDIPINE 10 MG/1
10-20 CAPSULE ORAL
Status: DISCONTINUED | OUTPATIENT
Start: 2025-02-05 | End: 2025-02-11 | Stop reason: HOSPADM

## 2025-02-05 RX ORDER — ACETAMINOPHEN 325 MG/1
650 TABLET ORAL EVERY 4 HOURS PRN
Status: DISCONTINUED | OUTPATIENT
Start: 2025-02-05 | End: 2025-02-10 | Stop reason: SINTOL

## 2025-02-05 RX ADMIN — NIFEDIPINE 30 MG: 30 TABLET, EXTENDED RELEASE ORAL at 19:01

## 2025-02-05 RX ADMIN — ACETAMINOPHEN 650 MG: 325 TABLET ORAL at 22:29

## 2025-02-05 RX ADMIN — NIFEDIPINE 10 MG: 10 CAPSULE ORAL at 20:36

## 2025-02-05 RX ADMIN — DOXYLAMINE SUCCINATE 50 MG: 25 TABLET ORAL at 22:28

## 2025-02-05 ASSESSMENT — ACTIVITIES OF DAILY LIVING (ADL)
ADLS_ACUITY_SCORE: 15

## 2025-02-05 NOTE — TELEPHONE ENCOUNTER
Phone Call:    Sourav Mascorro, is a 34 year old,  at 35w1d, calls reporting new onset of right upper quadrant pain and right eye pressure which started earlier today. Recommended prompt evaluation at [facility] birthplace and patient agrees to come in now.  ETA 20 minutes. Birthplace charge RN notified and accepting of pt's impending arrival.        SHELDON Osorio, CNM  [facility]  Midwifery

## 2025-02-05 NOTE — PROGRESS NOTES
Presents here to check blood pressure. Also has had some pressure in her right eye. Some spotty vision.

## 2025-02-06 ENCOUNTER — ANESTHESIA (OUTPATIENT)
Dept: OBGYN | Facility: HOSPITAL | Age: 35
End: 2025-02-06
Payer: COMMERCIAL

## 2025-02-06 ENCOUNTER — ANESTHESIA EVENT (OUTPATIENT)
Dept: OBGYN | Facility: HOSPITAL | Age: 35
End: 2025-02-06
Payer: COMMERCIAL

## 2025-02-06 LAB
ABO + RH BLD: NORMAL
ALBUMIN SERPL BCG-MCNC: 3.2 G/DL (ref 3.5–5.2)
ALP SERPL-CCNC: 113 U/L (ref 40–150)
ALT SERPL W P-5'-P-CCNC: 48 U/L (ref 0–50)
ANION GAP SERPL CALCULATED.3IONS-SCNC: 12 MMOL/L (ref 7–15)
ANTIBODY SCREEN, TUBE: NORMAL
APTT PPP: 24 SECONDS (ref 22–38)
AST SERPL W P-5'-P-CCNC: 27 U/L (ref 0–45)
BASOPHILS # BLD AUTO: 0.1 10E3/UL (ref 0–0.2)
BASOPHILS NFR BLD AUTO: 0 %
BILIRUB SERPL-MCNC: 0.2 MG/DL
BUN SERPL-MCNC: 5.7 MG/DL (ref 6–20)
CALCIUM SERPL-MCNC: 8.7 MG/DL (ref 8.8–10.4)
CHLORIDE SERPL-SCNC: 102 MMOL/L (ref 98–107)
CREAT SERPL-MCNC: 0.72 MG/DL (ref 0.51–0.95)
EGFRCR SERPLBLD CKD-EPI 2021: >90 ML/MIN/1.73M2
EOSINOPHIL # BLD AUTO: 0.1 10E3/UL (ref 0–0.7)
EOSINOPHIL NFR BLD AUTO: 0 %
ERYTHROCYTE [DISTWIDTH] IN BLOOD BY AUTOMATED COUNT: 13 % (ref 10–15)
ERYTHROCYTE [DISTWIDTH] IN BLOOD BY AUTOMATED COUNT: 13 % (ref 10–15)
ERYTHROCYTE [DISTWIDTH] IN BLOOD BY AUTOMATED COUNT: 13.2 % (ref 10–15)
GLUCOSE SERPL-MCNC: 124 MG/DL (ref 70–99)
GP B STREP DNA SPEC QL NAA+PROBE: NEGATIVE
HCO3 SERPL-SCNC: 20 MMOL/L (ref 22–29)
HCT VFR BLD AUTO: 25.3 % (ref 35–47)
HCT VFR BLD AUTO: 29 % (ref 35–47)
HCT VFR BLD AUTO: 30.4 % (ref 35–47)
HGB BLD-MCNC: 10.2 G/DL (ref 11.7–15.7)
HGB BLD-MCNC: 8.6 G/DL (ref 11.7–15.7)
HGB BLD-MCNC: 9.8 G/DL (ref 11.7–15.7)
IMM GRANULOCYTES # BLD: 0.1 10E3/UL
IMM GRANULOCYTES NFR BLD: 1 %
INR PPP: 0.97 (ref 0.85–1.15)
LYMPHOCYTES # BLD AUTO: 2.1 10E3/UL (ref 0.8–5.3)
LYMPHOCYTES NFR BLD AUTO: 10 %
MCH RBC QN AUTO: 30.6 PG (ref 26.5–33)
MCH RBC QN AUTO: 30.9 PG (ref 26.5–33)
MCH RBC QN AUTO: 31.4 PG (ref 26.5–33)
MCHC RBC AUTO-ENTMCNC: 33.6 G/DL (ref 31.5–36.5)
MCHC RBC AUTO-ENTMCNC: 33.8 G/DL (ref 31.5–36.5)
MCHC RBC AUTO-ENTMCNC: 34 G/DL (ref 31.5–36.5)
MCV RBC AUTO: 91 FL (ref 78–100)
MCV RBC AUTO: 92 FL (ref 78–100)
MCV RBC AUTO: 92 FL (ref 78–100)
MONOCYTES # BLD AUTO: 1 10E3/UL (ref 0–1.3)
MONOCYTES NFR BLD AUTO: 5 %
NEUTROPHILS # BLD AUTO: 17.3 10E3/UL (ref 1.6–8.3)
NEUTROPHILS NFR BLD AUTO: 84 %
NRBC # BLD AUTO: 0 10E3/UL
NRBC BLD AUTO-RTO: 0 /100
PLATELET # BLD AUTO: 208 10E3/UL (ref 150–450)
PLATELET # BLD AUTO: 238 10E3/UL (ref 150–450)
PLATELET # BLD AUTO: 274 10E3/UL (ref 150–450)
POTASSIUM SERPL-SCNC: 3.3 MMOL/L (ref 3.4–5.3)
PROT SERPL-MCNC: 5.8 G/DL (ref 6.4–8.3)
RBC # BLD AUTO: 2.74 10E6/UL (ref 3.8–5.2)
RBC # BLD AUTO: 3.17 10E6/UL (ref 3.8–5.2)
RBC # BLD AUTO: 3.33 10E6/UL (ref 3.8–5.2)
SODIUM SERPL-SCNC: 134 MMOL/L (ref 135–145)
SPECIMEN EXP DATE BLD: NORMAL
SPECIMEN EXP DATE BLD: NORMAL
T PALLIDUM AB SER QL: NONREACTIVE
WBC # BLD AUTO: 10.9 10E3/UL (ref 4–11)
WBC # BLD AUTO: 12.8 10E3/UL (ref 4–11)
WBC # BLD AUTO: 20.6 10E3/UL (ref 4–11)

## 2025-02-06 PROCEDURE — G0378 HOSPITAL OBSERVATION PER HR: HCPCS

## 2025-02-06 PROCEDURE — 36415 COLL VENOUS BLD VENIPUNCTURE: CPT | Performed by: OBSTETRICS & GYNECOLOGY

## 2025-02-06 PROCEDURE — 360N000076 HC SURGERY LEVEL 3, PER MIN: Performed by: OBSTETRICS & GYNECOLOGY

## 2025-02-06 PROCEDURE — 85025 COMPLETE CBC W/AUTO DIFF WBC: CPT | Performed by: OBSTETRICS & GYNECOLOGY

## 2025-02-06 PROCEDURE — 999N000249 HC STATISTIC C-SECTION ON UNIT

## 2025-02-06 PROCEDURE — 99222 1ST HOSP IP/OBS MODERATE 55: CPT | Performed by: OBSTETRICS & GYNECOLOGY

## 2025-02-06 PROCEDURE — 370N000017 HC ANESTHESIA TECHNICAL FEE, PER MIN: Performed by: OBSTETRICS & GYNECOLOGY

## 2025-02-06 PROCEDURE — 272N000001 HC OR GENERAL SUPPLY STERILE: Performed by: OBSTETRICS & GYNECOLOGY

## 2025-02-06 PROCEDURE — 86901 BLOOD TYPING SEROLOGIC RH(D): CPT | Performed by: OBSTETRICS & GYNECOLOGY

## 2025-02-06 PROCEDURE — 84450 TRANSFERASE (AST) (SGOT): CPT | Performed by: OBSTETRICS & GYNECOLOGY

## 2025-02-06 PROCEDURE — 250N000011 HC RX IP 250 OP 636: Performed by: OBSTETRICS & GYNECOLOGY

## 2025-02-06 PROCEDURE — 250N000009 HC RX 250: Performed by: OBSTETRICS & GYNECOLOGY

## 2025-02-06 PROCEDURE — 96374 THER/PROPH/DIAG INJ IV PUSH: CPT

## 2025-02-06 PROCEDURE — 258N000003 HC RX IP 258 OP 636: Performed by: STUDENT IN AN ORGANIZED HEALTH CARE EDUCATION/TRAINING PROGRAM

## 2025-02-06 PROCEDURE — 86850 RBC ANTIBODY SCREEN: CPT | Performed by: OBSTETRICS & GYNECOLOGY

## 2025-02-06 PROCEDURE — 96372 THER/PROPH/DIAG INJ SC/IM: CPT | Performed by: OBSTETRICS & GYNECOLOGY

## 2025-02-06 PROCEDURE — 96376 TX/PRO/DX INJ SAME DRUG ADON: CPT

## 2025-02-06 PROCEDURE — 84295 ASSAY OF SERUM SODIUM: CPT | Performed by: OBSTETRICS & GYNECOLOGY

## 2025-02-06 PROCEDURE — 250N000013 HC RX MED GY IP 250 OP 250 PS 637: Performed by: OBSTETRICS & GYNECOLOGY

## 2025-02-06 PROCEDURE — 86923 COMPATIBILITY TEST ELECTRIC: CPT | Performed by: OBSTETRICS & GYNECOLOGY

## 2025-02-06 PROCEDURE — 999N000016 HC STATISTIC ATTENDANCE AT DELIVERY

## 2025-02-06 PROCEDURE — 96361 HYDRATE IV INFUSION ADD-ON: CPT

## 2025-02-06 PROCEDURE — 250N000011 HC RX IP 250 OP 636: Mod: JZ | Performed by: STUDENT IN AN ORGANIZED HEALTH CARE EDUCATION/TRAINING PROGRAM

## 2025-02-06 PROCEDURE — 710N000010 HC RECOVERY PHASE 1, LEVEL 2, PER MIN: Performed by: OBSTETRICS & GYNECOLOGY

## 2025-02-06 PROCEDURE — 999N000157 HC STATISTIC RCP TIME EA 10 MIN

## 2025-02-06 PROCEDURE — 85610 PROTHROMBIN TIME: CPT | Performed by: OBSTETRICS & GYNECOLOGY

## 2025-02-06 PROCEDURE — 96375 TX/PRO/DX INJ NEW DRUG ADDON: CPT

## 2025-02-06 PROCEDURE — 10S0XZZ REPOSITION PRODUCTS OF CONCEPTION, EXTERNAL APPROACH: ICD-10-PCS | Performed by: OBSTETRICS & GYNECOLOGY

## 2025-02-06 PROCEDURE — 250N000009 HC RX 250: Performed by: STUDENT IN AN ORGANIZED HEALTH CARE EDUCATION/TRAINING PROGRAM

## 2025-02-06 PROCEDURE — 88307 TISSUE EXAM BY PATHOLOGIST: CPT | Mod: TC | Performed by: OBSTETRICS & GYNECOLOGY

## 2025-02-06 PROCEDURE — 85041 AUTOMATED RBC COUNT: CPT | Performed by: OBSTETRICS & GYNECOLOGY

## 2025-02-06 PROCEDURE — 258N000003 HC RX IP 258 OP 636: Performed by: OBSTETRICS & GYNECOLOGY

## 2025-02-06 PROCEDURE — 3E0P3VZ INTRODUCTION OF HORMONE INTO FEMALE REPRODUCTIVE, PERCUTANEOUS APPROACH: ICD-10-PCS | Performed by: OBSTETRICS & GYNECOLOGY

## 2025-02-06 PROCEDURE — 99231 SBSQ HOSP IP/OBS SF/LOW 25: CPT | Performed by: NURSE PRACTITIONER

## 2025-02-06 PROCEDURE — 85014 HEMATOCRIT: CPT | Performed by: OBSTETRICS & GYNECOLOGY

## 2025-02-06 PROCEDURE — 93005 ELECTROCARDIOGRAM TRACING: CPT

## 2025-02-06 PROCEDURE — 85730 THROMBOPLASTIN TIME PARTIAL: CPT | Performed by: OBSTETRICS & GYNECOLOGY

## 2025-02-06 RX ORDER — TERBUTALINE SULFATE 1 MG/ML
0.25 INJECTION SUBCUTANEOUS ONCE
Status: COMPLETED | OUTPATIENT
Start: 2025-02-06 | End: 2025-02-06

## 2025-02-06 RX ORDER — LABETALOL HYDROCHLORIDE 5 MG/ML
20-80 INJECTION, SOLUTION INTRAVENOUS EVERY 10 MIN PRN
Status: DISCONTINUED | OUTPATIENT
Start: 2025-02-06 | End: 2025-02-11 | Stop reason: HOSPADM

## 2025-02-06 RX ORDER — NALOXONE HYDROCHLORIDE 0.4 MG/ML
0.4 INJECTION, SOLUTION INTRAMUSCULAR; INTRAVENOUS; SUBCUTANEOUS
Status: DISCONTINUED | OUTPATIENT
Start: 2025-02-06 | End: 2025-02-07 | Stop reason: HOSPADM

## 2025-02-06 RX ORDER — IBUPROFEN 800 MG/1
800 TABLET, FILM COATED ORAL
Status: DISCONTINUED | OUTPATIENT
Start: 2025-02-06 | End: 2025-02-11 | Stop reason: HOSPADM

## 2025-02-06 RX ORDER — KETOROLAC TROMETHAMINE 30 MG/ML
INJECTION, SOLUTION INTRAMUSCULAR; INTRAVENOUS PRN
Status: DISCONTINUED | OUTPATIENT
Start: 2025-02-06 | End: 2025-02-06

## 2025-02-06 RX ORDER — LIDOCAINE 40 MG/G
CREAM TOPICAL
Status: DISCONTINUED | OUTPATIENT
Start: 2025-02-06 | End: 2025-02-06 | Stop reason: HOSPADM

## 2025-02-06 RX ORDER — PROCHLORPERAZINE MALEATE 10 MG
10 TABLET ORAL EVERY 6 HOURS PRN
Status: DISCONTINUED | OUTPATIENT
Start: 2025-02-06 | End: 2025-02-07 | Stop reason: HOSPADM

## 2025-02-06 RX ORDER — OXYTOCIN/0.9 % SODIUM CHLORIDE 30/500 ML
340 PLASTIC BAG, INJECTION (ML) INTRAVENOUS CONTINUOUS PRN
Status: DISCONTINUED | OUTPATIENT
Start: 2025-02-06 | End: 2025-02-06 | Stop reason: HOSPADM

## 2025-02-06 RX ORDER — OXYTOCIN 10 [USP'U]/ML
10 INJECTION, SOLUTION INTRAMUSCULAR; INTRAVENOUS
Status: DISCONTINUED | OUTPATIENT
Start: 2025-02-06 | End: 2025-02-11 | Stop reason: HOSPADM

## 2025-02-06 RX ORDER — HYDROXYZINE HYDROCHLORIDE 50 MG/1
50 TABLET, FILM COATED ORAL
Status: DISCONTINUED | OUTPATIENT
Start: 2025-02-06 | End: 2025-02-06

## 2025-02-06 RX ORDER — CEFAZOLIN SODIUM/WATER 3 G/30 ML
3 SYRINGE (ML) INTRAVENOUS SEE ADMIN INSTRUCTIONS
Status: DISCONTINUED | OUTPATIENT
Start: 2025-02-06 | End: 2025-02-06 | Stop reason: HOSPADM

## 2025-02-06 RX ORDER — METHYLERGONOVINE MALEATE 0.2 MG/ML
200 INJECTION INTRAVENOUS
Status: DISCONTINUED | OUTPATIENT
Start: 2025-02-06 | End: 2025-02-07 | Stop reason: HOSPADM

## 2025-02-06 RX ORDER — MAGNESIUM SULFATE IN WATER 40 MG/ML
1 INJECTION, SOLUTION INTRAVENOUS CONTINUOUS
Status: DISCONTINUED | OUTPATIENT
Start: 2025-02-06 | End: 2025-02-11 | Stop reason: HOSPADM

## 2025-02-06 RX ORDER — MORPHINE SULFATE 1 MG/ML
INJECTION, SOLUTION EPIDURAL; INTRATHECAL; INTRAVENOUS
Status: COMPLETED | OUTPATIENT
Start: 2025-02-06 | End: 2025-02-06

## 2025-02-06 RX ORDER — ACETAMINOPHEN 325 MG/1
975 TABLET ORAL ONCE
Status: COMPLETED | OUTPATIENT
Start: 2025-02-06 | End: 2025-02-06

## 2025-02-06 RX ORDER — EPHEDRINE SULFATE 50 MG/ML
INJECTION, SOLUTION INTRAMUSCULAR; INTRAVENOUS; SUBCUTANEOUS PRN
Status: DISCONTINUED | OUTPATIENT
Start: 2025-02-06 | End: 2025-02-06

## 2025-02-06 RX ORDER — CARBOPROST TROMETHAMINE 250 UG/ML
250 INJECTION, SOLUTION INTRAMUSCULAR
Status: DISCONTINUED | OUTPATIENT
Start: 2025-02-06 | End: 2025-02-06 | Stop reason: HOSPADM

## 2025-02-06 RX ORDER — LOPERAMIDE HYDROCHLORIDE 2 MG/1
2 CAPSULE ORAL
Status: DISCONTINUED | OUTPATIENT
Start: 2025-02-06 | End: 2025-02-07 | Stop reason: HOSPADM

## 2025-02-06 RX ORDER — KETOROLAC TROMETHAMINE 30 MG/ML
30 INJECTION, SOLUTION INTRAMUSCULAR; INTRAVENOUS
Status: DISCONTINUED | OUTPATIENT
Start: 2025-02-06 | End: 2025-02-11 | Stop reason: HOSPADM

## 2025-02-06 RX ORDER — MORPHINE SULFATE 10 MG/ML
10 INJECTION, SOLUTION INTRAMUSCULAR; INTRAVENOUS
Status: DISCONTINUED | OUTPATIENT
Start: 2025-02-06 | End: 2025-02-06

## 2025-02-06 RX ORDER — NALOXONE HYDROCHLORIDE 0.4 MG/ML
0.2 INJECTION, SOLUTION INTRAMUSCULAR; INTRAVENOUS; SUBCUTANEOUS
Status: DISCONTINUED | OUTPATIENT
Start: 2025-02-06 | End: 2025-02-07 | Stop reason: HOSPADM

## 2025-02-06 RX ORDER — LIDOCAINE 40 MG/G
CREAM TOPICAL
Status: DISCONTINUED | OUTPATIENT
Start: 2025-02-06 | End: 2025-02-07 | Stop reason: HOSPADM

## 2025-02-06 RX ORDER — MAGNESIUM SULFATE HEPTAHYDRATE 40 MG/ML
2 INJECTION, SOLUTION INTRAVENOUS
Status: DISCONTINUED | OUTPATIENT
Start: 2025-02-06 | End: 2025-02-11 | Stop reason: HOSPADM

## 2025-02-06 RX ORDER — CITRIC ACID/SODIUM CITRATE 334-500MG
30 SOLUTION, ORAL ORAL
Status: DISCONTINUED | OUTPATIENT
Start: 2025-02-06 | End: 2025-02-07 | Stop reason: HOSPADM

## 2025-02-06 RX ORDER — MISOPROSTOL 200 UG/1
800 TABLET ORAL
Status: DISCONTINUED | OUTPATIENT
Start: 2025-02-06 | End: 2025-02-06 | Stop reason: HOSPADM

## 2025-02-06 RX ORDER — METHYLERGONOVINE MALEATE 0.2 MG/ML
200 INJECTION INTRAVENOUS
Status: DISCONTINUED | OUTPATIENT
Start: 2025-02-06 | End: 2025-02-06 | Stop reason: HOSPADM

## 2025-02-06 RX ORDER — MAGNESIUM SULFATE 4 G/50ML
4 INJECTION INTRAVENOUS
Status: DISCONTINUED | OUTPATIENT
Start: 2025-02-06 | End: 2025-02-11 | Stop reason: HOSPADM

## 2025-02-06 RX ORDER — LABETALOL HYDROCHLORIDE 5 MG/ML
20 INJECTION, SOLUTION INTRAVENOUS
Status: DISCONTINUED | OUTPATIENT
Start: 2025-02-06 | End: 2025-02-11 | Stop reason: HOSPADM

## 2025-02-06 RX ORDER — SODIUM CHLORIDE, SODIUM LACTATE, POTASSIUM CHLORIDE, CALCIUM CHLORIDE 600; 310; 30; 20 MG/100ML; MG/100ML; MG/100ML; MG/100ML
10-125 INJECTION, SOLUTION INTRAVENOUS CONTINUOUS
Status: DISCONTINUED | OUTPATIENT
Start: 2025-02-06 | End: 2025-02-11 | Stop reason: HOSPADM

## 2025-02-06 RX ORDER — OXYTOCIN/0.9 % SODIUM CHLORIDE 30/500 ML
340 PLASTIC BAG, INJECTION (ML) INTRAVENOUS CONTINUOUS PRN
Status: DISCONTINUED | OUTPATIENT
Start: 2025-02-06 | End: 2025-02-07 | Stop reason: HOSPADM

## 2025-02-06 RX ORDER — SODIUM CHLORIDE, SODIUM LACTATE, POTASSIUM CHLORIDE, CALCIUM CHLORIDE 600; 310; 30; 20 MG/100ML; MG/100ML; MG/100ML; MG/100ML
INJECTION, SOLUTION INTRAVENOUS CONTINUOUS
Status: DISCONTINUED | OUTPATIENT
Start: 2025-02-06 | End: 2025-02-07 | Stop reason: HOSPADM

## 2025-02-06 RX ORDER — LIDOCAINE 40 MG/G
CREAM TOPICAL
Status: DISCONTINUED | OUTPATIENT
Start: 2025-02-06 | End: 2025-02-11 | Stop reason: HOSPADM

## 2025-02-06 RX ORDER — TRANEXAMIC ACID 10 MG/ML
1 INJECTION, SOLUTION INTRAVENOUS EVERY 30 MIN PRN
Status: DISCONTINUED | OUTPATIENT
Start: 2025-02-06 | End: 2025-02-07 | Stop reason: HOSPADM

## 2025-02-06 RX ORDER — TERBUTALINE SULFATE 1 MG/ML
0.25 INJECTION SUBCUTANEOUS
Status: COMPLETED | OUTPATIENT
Start: 2025-02-06 | End: 2025-02-06

## 2025-02-06 RX ORDER — CARBOPROST TROMETHAMINE 250 UG/ML
250 INJECTION, SOLUTION INTRAMUSCULAR
Status: DISCONTINUED | OUTPATIENT
Start: 2025-02-06 | End: 2025-02-07 | Stop reason: HOSPADM

## 2025-02-06 RX ORDER — BUPIVACAINE HYDROCHLORIDE 2.5 MG/ML
INJECTION, SOLUTION EPIDURAL; INFILTRATION; INTRACAUDAL
Status: DISCONTINUED | OUTPATIENT
Start: 2025-02-06 | End: 2025-02-06

## 2025-02-06 RX ORDER — MISOPROSTOL 200 UG/1
400 TABLET ORAL
Status: DISCONTINUED | OUTPATIENT
Start: 2025-02-06 | End: 2025-02-06 | Stop reason: HOSPADM

## 2025-02-06 RX ORDER — ONDANSETRON 4 MG/1
4 TABLET, ORALLY DISINTEGRATING ORAL EVERY 6 HOURS PRN
Status: DISCONTINUED | OUTPATIENT
Start: 2025-02-06 | End: 2025-02-07 | Stop reason: HOSPADM

## 2025-02-06 RX ORDER — ACETAMINOPHEN 325 MG/1
650 TABLET ORAL EVERY 4 HOURS PRN
Status: DISCONTINUED | OUTPATIENT
Start: 2025-02-06 | End: 2025-02-07

## 2025-02-06 RX ORDER — LIDOCAINE 40 MG/G
CREAM TOPICAL
Status: DISCONTINUED | OUTPATIENT
Start: 2025-02-06 | End: 2025-02-06

## 2025-02-06 RX ORDER — BUPIVACAINE HYDROCHLORIDE 7.5 MG/ML
INJECTION, SOLUTION INTRASPINAL
Status: COMPLETED | OUTPATIENT
Start: 2025-02-06 | End: 2025-02-06

## 2025-02-06 RX ORDER — NALOXONE HYDROCHLORIDE 0.4 MG/ML
0.1 INJECTION, SOLUTION INTRAMUSCULAR; INTRAVENOUS; SUBCUTANEOUS
Status: DISCONTINUED | OUTPATIENT
Start: 2025-02-06 | End: 2025-02-06 | Stop reason: HOSPADM

## 2025-02-06 RX ORDER — CITRIC ACID/SODIUM CITRATE 334-500MG
30 SOLUTION, ORAL ORAL
Status: COMPLETED | OUTPATIENT
Start: 2025-02-06 | End: 2025-02-06

## 2025-02-06 RX ORDER — OXYTOCIN 10 [USP'U]/ML
10 INJECTION, SOLUTION INTRAMUSCULAR; INTRAVENOUS
Status: DISCONTINUED | OUTPATIENT
Start: 2025-02-06 | End: 2025-02-06 | Stop reason: HOSPADM

## 2025-02-06 RX ORDER — ONDANSETRON 2 MG/ML
4 INJECTION INTRAMUSCULAR; INTRAVENOUS EVERY 6 HOURS PRN
Status: DISCONTINUED | OUTPATIENT
Start: 2025-02-06 | End: 2025-02-07 | Stop reason: HOSPADM

## 2025-02-06 RX ORDER — NIFEDIPINE 10 MG/1
10-20 CAPSULE ORAL
Status: DISCONTINUED | OUTPATIENT
Start: 2025-02-06 | End: 2025-02-11 | Stop reason: HOSPADM

## 2025-02-06 RX ORDER — METOCLOPRAMIDE 10 MG/1
10 TABLET ORAL EVERY 6 HOURS PRN
Status: DISCONTINUED | OUTPATIENT
Start: 2025-02-06 | End: 2025-02-07 | Stop reason: HOSPADM

## 2025-02-06 RX ORDER — NIFEDIPINE 30 MG
30 TABLET, EXTENDED RELEASE ORAL DAILY
Status: DISCONTINUED | OUTPATIENT
Start: 2025-02-07 | End: 2025-02-08

## 2025-02-06 RX ORDER — TRANEXAMIC ACID 10 MG/ML
1 INJECTION, SOLUTION INTRAVENOUS EVERY 30 MIN PRN
Status: DISCONTINUED | OUTPATIENT
Start: 2025-02-06 | End: 2025-02-06 | Stop reason: HOSPADM

## 2025-02-06 RX ORDER — MAGNESIUM SULFATE 4 G/50ML
4 INJECTION INTRAVENOUS ONCE
Status: COMPLETED | OUTPATIENT
Start: 2025-02-06 | End: 2025-02-06

## 2025-02-06 RX ORDER — OXYTOCIN 10 [USP'U]/ML
10 INJECTION, SOLUTION INTRAMUSCULAR; INTRAVENOUS
Status: COMPLETED | OUTPATIENT
Start: 2025-02-06 | End: 2025-02-06

## 2025-02-06 RX ORDER — PENICILLIN G POTASSIUM 5000000 [IU]/1
5 INJECTION, POWDER, FOR SOLUTION INTRAMUSCULAR; INTRAVENOUS ONCE
Status: COMPLETED | OUTPATIENT
Start: 2025-02-06 | End: 2025-02-06

## 2025-02-06 RX ORDER — ONDANSETRON 2 MG/ML
INJECTION INTRAMUSCULAR; INTRAVENOUS PRN
Status: DISCONTINUED | OUTPATIENT
Start: 2025-02-06 | End: 2025-02-06

## 2025-02-06 RX ORDER — MISOPROSTOL 100 UG/1
25 TABLET ORAL EVERY 4 HOURS PRN
Status: DISCONTINUED | OUTPATIENT
Start: 2025-02-06 | End: 2025-02-06

## 2025-02-06 RX ORDER — DEXTROSE MONOHYDRATE 25 G/50ML
25-50 INJECTION, SOLUTION INTRAVENOUS
Status: DISCONTINUED | OUTPATIENT
Start: 2025-02-06 | End: 2025-02-06

## 2025-02-06 RX ORDER — LOPERAMIDE HYDROCHLORIDE 2 MG/1
2 CAPSULE ORAL
Status: DISCONTINUED | OUTPATIENT
Start: 2025-02-06 | End: 2025-02-06 | Stop reason: HOSPADM

## 2025-02-06 RX ORDER — LOPERAMIDE HYDROCHLORIDE 2 MG/1
4 CAPSULE ORAL
Status: DISCONTINUED | OUTPATIENT
Start: 2025-02-06 | End: 2025-02-07 | Stop reason: HOSPADM

## 2025-02-06 RX ORDER — METOCLOPRAMIDE HYDROCHLORIDE 5 MG/ML
10 INJECTION INTRAMUSCULAR; INTRAVENOUS EVERY 6 HOURS PRN
Status: DISCONTINUED | OUTPATIENT
Start: 2025-02-06 | End: 2025-02-07 | Stop reason: HOSPADM

## 2025-02-06 RX ORDER — LOPERAMIDE HYDROCHLORIDE 2 MG/1
4 CAPSULE ORAL
Status: DISCONTINUED | OUTPATIENT
Start: 2025-02-06 | End: 2025-02-06 | Stop reason: HOSPADM

## 2025-02-06 RX ORDER — FENTANYL CITRATE 50 UG/ML
100 INJECTION, SOLUTION INTRAMUSCULAR; INTRAVENOUS
Status: DISCONTINUED | OUTPATIENT
Start: 2025-02-06 | End: 2025-02-07 | Stop reason: HOSPADM

## 2025-02-06 RX ORDER — CEFAZOLIN SODIUM/WATER 3 G/30 ML
3 SYRINGE (ML) INTRAVENOUS
Status: DISCONTINUED | OUTPATIENT
Start: 2025-02-06 | End: 2025-02-06 | Stop reason: HOSPADM

## 2025-02-06 RX ORDER — OXYTOCIN/0.9 % SODIUM CHLORIDE 30/500 ML
PLASTIC BAG, INJECTION (ML) INTRAVENOUS CONTINUOUS PRN
Status: DISCONTINUED | OUTPATIENT
Start: 2025-02-06 | End: 2025-02-06

## 2025-02-06 RX ORDER — CALCIUM GLUCONATE 94 MG/ML
1 INJECTION, SOLUTION INTRAVENOUS
Status: DISCONTINUED | OUTPATIENT
Start: 2025-02-06 | End: 2025-02-11 | Stop reason: HOSPADM

## 2025-02-06 RX ORDER — HYDRALAZINE HYDROCHLORIDE 20 MG/ML
10 INJECTION INTRAMUSCULAR; INTRAVENOUS
Status: DISCONTINUED | OUTPATIENT
Start: 2025-02-06 | End: 2025-02-11 | Stop reason: HOSPADM

## 2025-02-06 RX ORDER — FLUMAZENIL 0.1 MG/ML
0.2 INJECTION, SOLUTION INTRAVENOUS
Status: DISCONTINUED | OUTPATIENT
Start: 2025-02-06 | End: 2025-02-06 | Stop reason: HOSPADM

## 2025-02-06 RX ORDER — NICOTINE POLACRILEX 4 MG
15-30 LOZENGE BUCCAL
Status: DISCONTINUED | OUTPATIENT
Start: 2025-02-06 | End: 2025-02-06

## 2025-02-06 RX ORDER — MISOPROSTOL 200 UG/1
800 TABLET ORAL
Status: DISCONTINUED | OUTPATIENT
Start: 2025-02-06 | End: 2025-02-07 | Stop reason: HOSPADM

## 2025-02-06 RX ORDER — MISOPROSTOL 200 UG/1
400 TABLET ORAL
Status: DISCONTINUED | OUTPATIENT
Start: 2025-02-06 | End: 2025-02-07 | Stop reason: HOSPADM

## 2025-02-06 RX ORDER — PENICILLIN G 3000000 [IU]/50ML
3 INJECTION, SOLUTION INTRAVENOUS EVERY 4 HOURS
Status: DISCONTINUED | OUTPATIENT
Start: 2025-02-06 | End: 2025-02-07

## 2025-02-06 RX ORDER — GLYCOPYRROLATE 0.2 MG/ML
INJECTION, SOLUTION INTRAMUSCULAR; INTRAVENOUS PRN
Status: DISCONTINUED | OUTPATIENT
Start: 2025-02-06 | End: 2025-02-06

## 2025-02-06 RX ORDER — FENTANYL CITRATE 50 UG/ML
INJECTION, SOLUTION INTRAMUSCULAR; INTRAVENOUS
Status: COMPLETED | OUTPATIENT
Start: 2025-02-06 | End: 2025-02-06

## 2025-02-06 RX ORDER — SODIUM CHLORIDE, SODIUM LACTATE, POTASSIUM CHLORIDE, CALCIUM CHLORIDE 600; 310; 30; 20 MG/100ML; MG/100ML; MG/100ML; MG/100ML
INJECTION, SOLUTION INTRAVENOUS CONTINUOUS
Status: DISCONTINUED | OUTPATIENT
Start: 2025-02-06 | End: 2025-02-06 | Stop reason: HOSPADM

## 2025-02-06 RX ORDER — OXYTOCIN/0.9 % SODIUM CHLORIDE 30/500 ML
100-340 PLASTIC BAG, INJECTION (ML) INTRAVENOUS CONTINUOUS PRN
Status: DISCONTINUED | OUTPATIENT
Start: 2025-02-06 | End: 2025-02-11 | Stop reason: HOSPADM

## 2025-02-06 RX ORDER — FAMOTIDINE 10 MG
10 TABLET ORAL 2 TIMES DAILY PRN
Status: DISCONTINUED | OUTPATIENT
Start: 2025-02-06 | End: 2025-02-11 | Stop reason: HOSPADM

## 2025-02-06 RX ADMIN — NIFEDIPINE 30 MG: 30 TABLET, EXTENDED RELEASE ORAL at 08:04

## 2025-02-06 RX ADMIN — SODIUM CITRATE AND CITRIC ACID MONOHYDRATE 30 ML: 500; 334 SOLUTION ORAL at 19:24

## 2025-02-06 RX ADMIN — SODIUM CHLORIDE, POTASSIUM CHLORIDE, SODIUM LACTATE AND CALCIUM CHLORIDE: 600; 310; 30; 20 INJECTION, SOLUTION INTRAVENOUS at 14:53

## 2025-02-06 RX ADMIN — ACETAMINOPHEN 650 MG: 325 TABLET ORAL at 11:32

## 2025-02-06 RX ADMIN — NIFEDIPINE 10 MG: 10 CAPSULE ORAL at 18:06

## 2025-02-06 RX ADMIN — PHENYLEPHRINE HYDROCHLORIDE 0.5 MCG/KG/MIN: 10 INJECTION INTRAVENOUS at 20:18

## 2025-02-06 RX ADMIN — OXYTOCIN 10 UNITS: 10 INJECTION, SOLUTION INTRAMUSCULAR; INTRAVENOUS at 20:35

## 2025-02-06 RX ADMIN — BUPIVACAINE HYDROCHLORIDE 30 ML: 2.5 INJECTION, SOLUTION EPIDURAL; INFILTRATION; INTRACAUDAL; PERINEURAL at 21:11

## 2025-02-06 RX ADMIN — KETOROLAC TROMETHAMINE 15 MG: 30 INJECTION, SOLUTION INTRAMUSCULAR at 21:30

## 2025-02-06 RX ADMIN — TERBUTALINE SULFATE 0.25 MG: 1 INJECTION, SOLUTION SUBCUTANEOUS at 17:15

## 2025-02-06 RX ADMIN — TRANEXAMIC ACID 1 G: 10 INJECTION, SOLUTION INTRAVENOUS at 20:00

## 2025-02-06 RX ADMIN — ACETAMINOPHEN 975 MG: 325 TABLET ORAL at 18:06

## 2025-02-06 RX ADMIN — PENICILLIN G POTASSIUM 5 MILLION UNITS: 5000000 POWDER, FOR SOLUTION INTRAMUSCULAR; INTRAPLEURAL; INTRATHECAL; INTRAVENOUS at 15:14

## 2025-02-06 RX ADMIN — PHENYLEPHRINE HYDROCHLORIDE 100 MCG: 10 INJECTION INTRAVENOUS at 20:21

## 2025-02-06 RX ADMIN — MORPHINE SULFATE 0.15 MG: 1 INJECTION, SOLUTION EPIDURAL; INTRATHECAL; INTRAVENOUS at 20:00

## 2025-02-06 RX ADMIN — MAGNESIUM SULFATE HEPTAHYDRATE 4 G: 80 INJECTION, SOLUTION INTRAVENOUS at 14:52

## 2025-02-06 RX ADMIN — Medication 300 ML/HR: at 20:36

## 2025-02-06 RX ADMIN — GLYCOPYRROLATE 0.4 MG: 0.2 INJECTION INTRAMUSCULAR; INTRAVENOUS at 20:21

## 2025-02-06 RX ADMIN — ASPIRIN 81 MG CHEWABLE TABLET 81 MG: 81 TABLET CHEWABLE at 08:04

## 2025-02-06 RX ADMIN — BUPROPION HYDROCHLORIDE 300 MG: 300 TABLET, EXTENDED RELEASE ORAL at 08:04

## 2025-02-06 RX ADMIN — BUPIVACAINE 20 ML: 13.3 INJECTION, SUSPENSION, LIPOSOMAL INFILTRATION at 21:11

## 2025-02-06 RX ADMIN — LEVOTHYROXINE SODIUM 25 MCG: 0.03 TABLET ORAL at 07:37

## 2025-02-06 RX ADMIN — ONDANSETRON 4 MG: 2 INJECTION INTRAMUSCULAR; INTRAVENOUS at 20:00

## 2025-02-06 RX ADMIN — MAGNESIUM SULFATE HEPTAHYDRATE 1 G/HR: 40 INJECTION, SOLUTION INTRAVENOUS at 15:23

## 2025-02-06 RX ADMIN — NIFEDIPINE 20 MG: 10 CAPSULE ORAL at 18:29

## 2025-02-06 RX ADMIN — Medication 3 G: at 19:52

## 2025-02-06 RX ADMIN — Medication 10 MG: at 20:19

## 2025-02-06 RX ADMIN — BUPIVACAINE HYDROCHLORIDE IN DEXTROSE 1.5 ML: 7.5 INJECTION, SOLUTION SUBARACHNOID at 20:00

## 2025-02-06 RX ADMIN — SODIUM CHLORIDE, POTASSIUM CHLORIDE, SODIUM LACTATE AND CALCIUM CHLORIDE 500 ML: 600; 310; 30; 20 INJECTION, SOLUTION INTRAVENOUS at 23:57

## 2025-02-06 RX ADMIN — FENTANYL CITRATE 10 MCG: 50 INJECTION, SOLUTION INTRAMUSCULAR; INTRAVENOUS at 20:00

## 2025-02-06 ASSESSMENT — ACTIVITIES OF DAILY LIVING (ADL)
ADLS_ACUITY_SCORE: 16
ADLS_ACUITY_SCORE: 15
ADLS_ACUITY_SCORE: 16
ADLS_ACUITY_SCORE: 15
ADLS_ACUITY_SCORE: 16

## 2025-02-06 NOTE — PLAN OF CARE
Goal Outcome Evaluation:      Plan of Care Reviewed With: patient, significant other    Overall Patient Progress: improvingOverall Patient Progress: improving    Outcome Evaluation: Pt vs are WNL. Denies pain. Denies sxs of preeclampsia. Took medication for sleep and has been able to get rest. Labs to be repeated in morning.

## 2025-02-06 NOTE — H&P
"[facility] Labor and Delivery In House OB Consult/ H and P    Sourav Mascorro MRN# [id number]   Age: 34 year old YOB: 1990     Date of Admission:  2025    Primary care provider: Otilia Wilson           Chief Complaint:   Sourav Mascorro is a 34 year old female  who is 35w1d pregnant who presents with  eye swelling, visual changes, nausea and RUQ pain, no specific HA or any other weakness.  I am asked to consult by Rose Morillo CNM.  Patient has been under [facility] care for pregnancy with collaboration with [facility] for history of cutaneous lupus, obesity (BMI 48), hepatic steatosis and gestational hypertension.  Most recent ALT 56 and AST normal on 2/3/2025.  Patient has been following Bps at home and known to be in mild range of gestational HTN, not on medication with exception of ASA.  Today she states that the eye swelling and visual changes are new and nausea/RUQ pain is worse. She endorses good fetal movement.            Pregnancy history:     OBSTETRIC HISTORY:    OB History    Para Term  AB Living   1 0 0 0 0 0   SAB IAB Ectopic Multiple Live Births   0 0 0 0 0      # Outcome Date GA Lbr Zhou/2nd Weight Sex Type Anes PTL Lv   1 Current                EDC: Estimated Date of Delivery: 3/11/25    Prenatal Labs:   Lab Results   Component Value Date    AS Negative 2024    HGB 10.0 (L) 2025       GBS Status:   No results found for: \"GBS\"    Active Problem List  Patient Active Problem List   Diagnosis    CARDIOVASCULAR SCREENING; LDL GOAL LESS THAN 160    Major depressive disorder    Breast asymmetry    Autoimmune hypothyroidism    Abnormal Pap smear of cervix    Elevated liver enzymes- Borderline Fatty Liver Disesease    Exercise-induced asthma    Family history of breast cancer    History of eating disorder    Inflammatory dermatosis    Insomnia    Reactive airway disease    At risk for complication of pregnancy    BMI 45.0-49.9, adult (H)    At " "high risk for venous thromboembolism (VTE)    Supervision of high risk pregnancy, antepartum    Glucose intolerance of pregnancy    Rh negative state in antepartum period    Hypokalemia    Gestational hypertension, third trimester    Anemia during pregnancy    Right upper quadrant pain    Eye pressure       Medication Prior to Admission  Medications Prior to Admission   Medication Sig Dispense Refill Last Dose/Taking    albuterol (PROAIR HFA/PROVENTIL HFA/VENTOLIN HFA) 108 (90 Base) MCG/ACT inhaler Inhale 2 puffs into the lungs every 4 hours as needed for cough.   More than a month    aspirin (ASA) 81 MG chewable tablet Take 81 mg by mouth daily.   2/5/2025    buPROPion (WELLBUTRIN XL) 300 MG 24 hr tablet Take 1 tablet (300 mg) by mouth every morning. 90 tablet 3 2/5/2025    Ferrous Sulfate (IRON PO) Take by mouth.   2/4/2025    ketoconazole (NIZORAL) 2 % external shampoo Apply topically daily as needed for itching or irritation. 120 mL 3 2/4/2025    levothyroxine (SYNTHROID) 25 MCG tablet Take 1 tablet (25 mcg) by mouth daily. 90 tablet 3 2/5/2025    Prenatal Vit-Fe Fumarate-FA (PRENATAL MULTIVITAMIN  PLUS IRON) 27-1 MG TABS Take 1 tablet by mouth daily   2/4/2025   .        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   -We reviewed that pregnancy outcomes for women with JOSE are generally very fa    Depressive disorder     Since age 12, on Wellbutrin since 2016.  No therapist at this time.  No mental health hospitalizations in her past.    Eating disorder     Reactive airway disease 01/30/2023    Exercise-induced, uses " albuterol as needed.    Thyroid disease            PSH: Tonsillectomy age 10    Social History:  SW at [employer]         Physical Exam:   Vitals were reviewed  Patient Vitals for the past 8 hrs:   BP Temp Temp src   25 1813 (!) 159/92 -- --   25 1758 (!) 167/96 -- --   25 1742 (!) 142/98 99  F (37.2  C) Oral     Alert, Ox 3  CV regular   Resp non labored  Ab gravid  Bedside usn demonstrates cephalic presentation   Cervix:   Membranes: intact   \  Presentation:Cephalic  Fetal Heart Rate Tracing: reactive and reassuring  normal baseline, moderate variability, accelerations present  Tocometer: external monitor           AST 30  ALT 53              Assessment:   Sourav Mascorro is a 35w1d pregnant female  with history of obesity, cutaneous lupus, hypothyroid, depression, fatty liver and gestational hypertension with HA, visual change and abdominal pain.  Most recent at 34 weeks was 2373 grams or 5 pounds 4 ounces and 53%ile.  Posterior placenta without previa.  Cephalic.  GBS unknown.  FULL CODE.  Lfts stable, platelets WNL, urine negative for protein.          Plan:   Discussed with patient and partner spectrum of conditions related to hypertensive disorders of pregnancy to include gestational HTN, pre-eclampsia with severe features, HELLP, etc.  Of note, patient did have a course of betamethasone last week.  Given her persistent BP elevations she meets criteria for antihypertensive medication, discussed nifedipine to be first line rather than labetalol given history of asthma.  Recommend overnight observation at minimum to monitor CNS symptoms, Bps and labs.  Discussed if severe range Bps or progression of symptoms/labs that this would meet indications for acute antihypertensive therapy, magnesium and recommendation to move toward delivery.  GBS to be collected.   Discussed latest recommendation for induction would be 37 weeks.  She elliott have repeat BPP scheduled on Friday with MFM.   If she is stable for discharge, she needs to transfer care to [facility] as she  risks out of CN care with antihypertensive medications.  All questions answered.  Hand off given to Dr. Duenas.  If she remains in house would initiate DVT prophylaxis.    Irma Mcbride MD    Total time at bedside and in coordination of care is 45 minutes.

## 2025-02-06 NOTE — PROGRESS NOTES
Bedside ultrasound performed by Dr. Reagan prior to starting induction process. However, baby was found to be jovi breech. Sourav and her  David discussed ECV vs. proceeding with C/S for breech presentation and have discussed the risks/benefits of each option with Dr. Bernard at bedside. ECV attempted with Edilia RAMOS, this RN, and  David at bedside for emotional support. ECV unsuccessful. Category 1 tracing before and after procedure. Severe range blood pressures treated with PO nifedipine. C/S preparation started.

## 2025-02-06 NOTE — PROGRESS NOTES
Safety check completed this morning in room. Sourav is coping with hospitalization. She has new onset left sided epigastric discomfort rating 3/10. Active fetal movement. Vitals stable, /90.

## 2025-02-06 NOTE — PROGRESS NOTES
Since the version was unsuccessful I am recommending that we proceed with  section.  Risks and benefits were reviewed.  All questions were answered.  She verbalized understanding the above.  She is in agreement with the plan.  Her blood pressure is remaining in a none severe range and there is a category 1 tracing.  Plan will be to do the  at 8 PM as the patient ate at noon.

## 2025-02-06 NOTE — ANESTHESIA PREPROCEDURE EVALUATION
"Anesthesia Pre-Procedure Evaluation    Patient: Sourav Mascorro   MRN: [id number] : 1990        Procedure : Procedure(s):   SECTION          Past Medical History:   Diagnosis Date    Cutaneous lupus erythematosus 2024    Had a skin rash on her right forearm for 7 years, biopsied and diagnosed with cutaneous lupus in 2022, does not have \"full-blown lupus\"     10/11/24: M Consult:  -She has not been diagnosed with SLE and states she is scheduled to follow up with Dermatology on 10/22 but has not required any treatment for this.   -We reviewed that pregnancy outcomes for women with JOSE are generally very fa    Depressive disorder     Since age 12, on Wellbutrin since 2016.  No therapist at this time.  No mental health hospitalizations in her past.    Eating disorder     Reactive airway disease 2023    Exercise-induced, uses albuterol as needed.    Thyroid disease       Past Surgical History:   Procedure Laterality Date    HC REMOVAL OF TONSILS,<11 Y/O      Description: Tonsillectomy;  Recorded: 2012;      Allergies   Allergen Reactions    Nkda [No Known Drug Allergy]       Social History     Tobacco Use    Smoking status: Never     Passive exposure: Never    Smokeless tobacco: Never   Substance Use Topics    Alcohol use: No      Wt Readings from Last 1 Encounters:   25 122.9 kg (270 lb 14.4 oz)        Anesthesia Evaluation            ROS/MED HX  ENT/Pulmonary:     (+)                      asthma                  Neurologic:       Cardiovascular:     (+)  - - PIH and Severe  -  - -                                      METS/Exercise Tolerance:     Hematologic:       Musculoskeletal:       GI/Hepatic:       Renal/Genitourinary:       Endo:     (+)          thyroid problem, hypothyroidism,           Psychiatric/Substance Use:       Infectious Disease:       Malignancy:       Other:      (+) Possibly pregnant, , ,         Physical Exam    Airway        Mallampati: II   " "    Respiratory Devices and Support         Dental       (+) Completely normal teeth      Cardiovascular   cardiovascular exam normal          Pulmonary   pulmonary exam normal                OUTSIDE LABS:  CBC:   Lab Results   Component Value Date    WBC 10.9 02/06/2025    WBC 12.4 (H) 02/05/2025    HGB 9.8 (L) 02/06/2025    HGB 10.0 (L) 02/05/2025    HCT 29.0 (L) 02/06/2025    HCT 29.5 (L) 02/05/2025     02/06/2025     02/05/2025     BMP:   Lab Results   Component Value Date     (L) 02/06/2025     02/05/2025    POTASSIUM 3.3 (L) 02/06/2025    POTASSIUM 3.9 02/05/2025    CHLORIDE 102 02/06/2025    CHLORIDE 102 02/05/2025    CO2 20 (L) 02/06/2025    CO2 21 (L) 02/05/2025    BUN 5.7 (L) 02/06/2025    BUN 9.1 02/05/2025    CR 0.72 02/06/2025    CR 0.77 02/05/2025     (H) 02/06/2025    GLC 89 02/05/2025     COAGS: No results found for: \"PTT\", \"INR\", \"FIBR\"  POC: No results found for: \"BGM\", \"HCG\", \"HCGS\"  HEPATIC:   Lab Results   Component Value Date    ALBUMIN 3.2 (L) 02/06/2025    PROTTOTAL 5.8 (L) 02/06/2025    ALT 48 02/06/2025    AST 27 02/06/2025    ALKPHOS 113 02/06/2025    BILITOTAL 0.2 02/06/2025     OTHER:   Lab Results   Component Value Date    A1C 5.3 07/15/2024    SHEELA 8.7 (L) 02/06/2025    TSH 2.84 09/16/2024    T4 1.22 09/16/2024       Anesthesia Plan    ASA Status:  3    NPO Status:  ELEVATED Aspiration Risk/Unknown    Anesthesia Type: Spinal.              Consents    Anesthesia Plan(s) and associated risks, benefits, and realistic alternatives discussed. Questions answered and patient/representative(s) expressed understanding.     - Discussed: Risks, Benefits and Alternatives for BOTH SEDATION and the PROCEDURE were discussed     - Discussed with:  Patient            Postoperative Care            Comments:               Javed Bartholomew MD    I have reviewed the pertinent notes and labs in the chart from the past 30 days and (re)examined the patient.  Any updates or " changes from those notes are reflected in this note.    Clinically Significant Risk Factors Present on Admission        # Hypokalemia: Lowest K = 3.3 mmol/L in last 2 days, will replace as needed  # Hyponatremia: Lowest Na = 134 mmol/L in last 2 days, will monitor as appropriate       # Hypoalbuminemia: Lowest albumin = 3.2 g/dL at 2/6/2025  6:56 AM, will monitor as appropriate   # Drug Induced Platelet Defect: home medication list includes an antiplatelet medication   # Hypertension: Noted on problem list      # Anemia: based on hgb <11           # Asthma: noted on problem list

## 2025-02-06 NOTE — PROGRESS NOTES
Dr Mcbride informed of  2 elevated blood pressure and that the blood pressure was WNL after the Nifedipine was given. No orders received at this time.

## 2025-02-06 NOTE — PROGRESS NOTES
Patient now with magnesium started, ready for Cook catheter placement and to begin induction.    Bedside ultrasound performed and baby is now breech with head at the 12 o clock, appears jovi breech.    Discussed briefly that this means a  or attempt at ECV, however not a great candidate for ECV given , a prime and had difficulties with fetal monitoring before, but if she was okay with trying and the risks we could.    Edilia DELA CRUZ CNM notified about change in status of induction for the patient, she will stop in to provide support. The patient called her  to come in to discuss options.    Martha Reagan MD, FACOG, [facility]  2025 4:24 PM

## 2025-02-06 NOTE — PROCEDURES
PROCEDURE NOTE - EXTERNAL CEPHALIC VERSION     NAME:  Sourav Mascorro   MRN: [id number]   : 1990     DATE OF SERVICE: 2025     PREOPERATIVE DIAGNOSIS: Intrauterine pregnancy at 35w2d, breech position, severe preeclampsia    POSTOPERATIVE DIAGNOSIS: Same    PROCEDURE:  Unsuccessful  external cephalic version    SURGEON:  Quentin Bernard MD     ANESTHESIA: none    COMPLICATIONS: none    DISPOSITION:  Preparing for  later this evening    PREOPERATIVE CONSENT: The risks of the procedure were discussed with the patient including but not exclusive to SROM, labor, fetal maternal hemorrhage, need for emergent  or non-urgent  and  labor.  Informed consent was obtained.    PROCEDURE:  After obtaining informed consent, fetal heart tones were checked and were stable with a reactive NST. Ultrasound verified breech position and location of placenta. The fetal back was identified, as well as the location of the head.  Terbutaline 0.25 mg IM was given by the RN.  The fetus was then gently rotated by putting gentle pressure in an upward motion on the buttocks, removing them from the pelvis.  Simultaneously gentle pressure was applied to the fetal occiput and I attempted to rotate the fetus from a breech position to the cephalic position in a counter-clockwise manner.  This was unsuccessful.  Fetal heart tones were in the 140s throughout the procedure.  The patient tolerated this procedure well.  The patient remained in the room in stable condition for futher monitoring following th eprocedure.      Quentin Bernard MD       CC: Otilia Wilson, Quentin Bernard MD

## 2025-02-06 NOTE — PROGRESS NOTES
Antepartum Progress Note    Sourav Mascorro MRN# [id number]   YOB: 1990 Age: 34 year old   Date of Admission: 2025             Assessment and Plan:   Sourav Mascorro is a 34 year old  at 35w2d who is admitted on 2025 for hypertension after presenting for eye swelling, visual changes, nausea, and RUQ pain. Received immediate release and extended release nifedipine last night and normotensive since.    -Labs normal this morning except for anemia  -MFM consult today to decide on inpatient/outpatient management. We had a long discussion on risks/benefits of induction, vs inpatient vs outpatient monitoring and diagnosis. We discussed induction process - cervical ripening options, evidence-based vs traditional options. Her goal is for a vaginal delivery without an epidural. We discussed typical processes for delivery - this is a surprise fetal sex baby.  -Fetal status:  -Monitoring: Continuous  -Betamethasone: NA  -Magnesium for neuroprotection: NA  -GBS in process          Attestation:  Amount of time performed on this daily note: 50 minutes.  Total face-to-face time: 35 minutes  Maryam Reagan MD           Subjective:   Feeling okay- did have eye swelling which was new yesterday and now LUQ pain this morning.         Physical Exam:   Vitals were reviewed  Temp: 98.6  F (37  C) Temp src: Oral BP: 138/81     Resp: 18        Wt Readings from Last 4 Encounters:   25 123.6 kg (272 lb 8 oz)   25 124.2 kg (273 lb 12.8 oz)   25 124.3 kg (274 lb)   24 124.3 kg (274 lb)       Intake/Output Summary (Last 24 hours) at 2025 0748  Last data filed at 2025 0600  Gross per 24 hour   Intake 245 ml   Output 2000 ml   Net -1755 ml       Gen: Alert, no acute distress  Abd: Gravid, nontender          NST and Data:   NST Procedure note    Indication: Hypertension    Procedure: Fetal non-stress test  Results: Reactive    Recent Results (from the past 24 hours)   Protein   random urine    Collection Time: 02/05/25  5:54 PM   Result Value Ref Range    Total Protein Urine mg/dL <6.0   mg/dL    Total Protein Urine mg/mg Creat      Creatinine Urine mg/dL 17.0 mg/dL   CBC with platelets    Collection Time: 02/05/25  5:56 PM   Result Value Ref Range    WBC Count 12.4 (H) 4.0 - 11.0 10e3/uL    RBC Count 3.27 (L) 3.80 - 5.20 10e6/uL    Hemoglobin 10.0 (L) 11.7 - 15.7 g/dL    Hematocrit 29.5 (L) 35.0 - 47.0 %    MCV 90 78 - 100 fL    MCH 30.6 26.5 - 33.0 pg    MCHC 33.9 31.5 - 36.5 g/dL    RDW 13.0 10.0 - 15.0 %    Platelet Count 240 150 - 450 10e3/uL   Comprehensive metabolic panel    Collection Time: 02/05/25  5:56 PM   Result Value Ref Range    Sodium 137 135 - 145 mmol/L    Potassium 3.9 3.4 - 5.3 mmol/L    Carbon Dioxide (CO2) 21 (L) 22 - 29 mmol/L    Anion Gap 14 7 - 15 mmol/L    Urea Nitrogen 9.1 6.0 - 20.0 mg/dL    Creatinine 0.77 0.51 - 0.95 mg/dL    GFR Estimate >90 >60 mL/min/1.73m2    Calcium 9.2 8.8 - 10.4 mg/dL    Chloride 102 98 - 107 mmol/L    Glucose 89 70 - 99 mg/dL    Alkaline Phosphatase 122 40 - 150 U/L    AST 30 0 - 45 U/L    ALT 53 (H) 0 - 50 U/L    Protein Total 6.3 (L) 6.4 - 8.3 g/dL    Albumin 3.7 3.5 - 5.2 g/dL    Bilirubin Total 0.2 <=1.2 mg/dL   Extra Red Top Tube    Collection Time: 02/05/25  5:56 PM   Result Value Ref Range    Hold Specimen [facility]    Comprehensive metabolic panel    Collection Time: 02/06/25  6:56 AM   Result Value Ref Range    Sodium 134 (L) 135 - 145 mmol/L    Potassium 3.3 (L) 3.4 - 5.3 mmol/L    Carbon Dioxide (CO2) 20 (L) 22 - 29 mmol/L    Anion Gap 12 7 - 15 mmol/L    Urea Nitrogen 5.7 (L) 6.0 - 20.0 mg/dL    Creatinine 0.72 0.51 - 0.95 mg/dL    GFR Estimate >90 >60 mL/min/1.73m2    Calcium 8.7 (L) 8.8 - 10.4 mg/dL    Chloride 102 98 - 107 mmol/L    Glucose 124 (H) 70 - 99 mg/dL    Alkaline Phosphatase 113 40 - 150 U/L    AST 27 0 - 45 U/L    ALT 48 0 - 50 U/L    Protein Total 5.8 (L) 6.4 - 8.3 g/dL    Albumin 3.2 (L) 3.5 - 5.2 g/dL     Bilirubin Total 0.2 <=1.2 mg/dL   CBC with platelets    Collection Time: 02/06/25  6:56 AM   Result Value Ref Range    WBC Count 10.9 4.0 - 11.0 10e3/uL    RBC Count 3.17 (L) 3.80 - 5.20 10e6/uL    Hemoglobin 9.8 (L) 11.7 - 15.7 g/dL    Hematocrit 29.0 (L) 35.0 - 47.0 %    MCV 92 78 - 100 fL    MCH 30.9 26.5 - 33.0 pg    MCHC 33.8 31.5 - 36.5 g/dL    RDW 13.0 10.0 - 15.0 %    Platelet Count 208 150 - 450 10e3/uL       All laboratory and imaging data in the past 24 hours reviewed

## 2025-02-06 NOTE — PROGRESS NOTES
Outpatient Note:    Patient Name:  Sourav Mascorro  :      1990  MRN:      [id number]    Assessment:   @ 35w1d here for evaluation of gestational hypertension  New onset of eye pressure, nausea, visual changes and epigastric pain    Plan:   -Serial blood pressures.   -HELLP labs stat.  -IV access.  -Plan consultation with [facility] once labs have resulted.  -Report given to Ely Kasper CNM to assumes care at 1800.     Subjective:   Sourav Mascorro is a 34 year old  35w1d, with an EDC of 3/11/25 who presented to [facility] at 5:25 PM for evaluation of new onset of visual changes (seeing spots), right eye pressure and right upper epigastric pain and intermittent nausea, all of which started this morning and have continued all day. Denies headache, shortness of breath, vomiting, leaking of fluid, bleeding, or changes in fetal movement. Has been checking BP at home consistently and ranges 130s-90s.     Antepartum chart reviewed. Followed by [facility] at the [facility] with consistent antepartum care starting at 10 weeks GA.     Objective:  Vital signs:   Temp:  [99  F (37.2  C)] 99  F (37.2  C)  BP: (142)/(98) 142/98    FHR: 135 baseline, moderate variability, 15 X 15 accelerations present, decelerations absent.   Uterine contractions: none       Physical Exam: alert and oriented  Respiratory: clear to auscultation bilaterally  Abdomen: SIUP, cephalic by Leopold's, abdomen non-tender  Legs: +2 edema, +2/+2 DTR, moves all freely, no clonus    Provider:SHELDON Osorio, RACHEL, IBCLC  [facility]    20  minutes spent on the date of the encounter doing chart review, review of test results, patient visit and documentation

## 2025-02-06 NOTE — PROGRESS NOTES
Magnesium loading dose initiated at 14:52 and RN continuous bedside presence. BP's stable, feeling flushed and tired but otherwise denies mag toxicity symptoms.

## 2025-02-07 VITALS
RESPIRATION RATE: 20 BRPM | WEIGHT: 270.9 LBS | BODY MASS INDEX: 44.06 KG/M2 | HEART RATE: 84 BPM | SYSTOLIC BLOOD PRESSURE: 112 MMHG | OXYGEN SATURATION: 98 % | DIASTOLIC BLOOD PRESSURE: 71 MMHG | TEMPERATURE: 98.2 F

## 2025-02-07 LAB
ALBUMIN SERPL BCG-MCNC: 2.9 G/DL (ref 3.5–5.2)
ALBUMIN SERPL BCG-MCNC: 3 G/DL (ref 3.5–5.2)
ALP SERPL-CCNC: 104 U/L (ref 40–150)
ALP SERPL-CCNC: 111 U/L (ref 40–150)
ALT SERPL W P-5'-P-CCNC: 43 U/L (ref 0–50)
ALT SERPL W P-5'-P-CCNC: 45 U/L (ref 0–50)
ANION GAP SERPL CALCULATED.3IONS-SCNC: 11 MMOL/L (ref 7–15)
ANION GAP SERPL CALCULATED.3IONS-SCNC: 8 MMOL/L (ref 7–15)
AST SERPL W P-5'-P-CCNC: 26 U/L (ref 0–45)
AST SERPL W P-5'-P-CCNC: 28 U/L (ref 0–45)
ATRIAL RATE - MUSE: 76 BPM
BILIRUB SERPL-MCNC: 0.3 MG/DL
BILIRUB SERPL-MCNC: 0.3 MG/DL
BUN SERPL-MCNC: 5.4 MG/DL (ref 6–20)
BUN SERPL-MCNC: 5.7 MG/DL (ref 6–20)
CALCIUM SERPL-MCNC: 7.5 MG/DL (ref 8.8–10.4)
CALCIUM SERPL-MCNC: 7.7 MG/DL (ref 8.8–10.4)
CHLORIDE SERPL-SCNC: 102 MMOL/L (ref 98–107)
CHLORIDE SERPL-SCNC: 103 MMOL/L (ref 98–107)
CREAT SERPL-MCNC: 0.74 MG/DL (ref 0.51–0.95)
CREAT SERPL-MCNC: 0.83 MG/DL (ref 0.51–0.95)
DIASTOLIC BLOOD PRESSURE - MUSE: NORMAL MMHG
EGFRCR SERPLBLD CKD-EPI 2021: >90 ML/MIN/1.73M2
EGFRCR SERPLBLD CKD-EPI 2021: >90 ML/MIN/1.73M2
ERYTHROCYTE [DISTWIDTH] IN BLOOD BY AUTOMATED COUNT: 13.2 % (ref 10–15)
GLUCOSE SERPL-MCNC: 113 MG/DL (ref 70–99)
GLUCOSE SERPL-MCNC: 119 MG/DL (ref 70–99)
HCO3 SERPL-SCNC: 19 MMOL/L (ref 22–29)
HCO3 SERPL-SCNC: 22 MMOL/L (ref 22–29)
HCT VFR BLD AUTO: 22.5 % (ref 35–47)
HGB BLD-MCNC: 7.6 G/DL (ref 11.7–15.7)
HOLD SPECIMEN: NORMAL
HOLD SPECIMEN: NORMAL
INTERPRETATION ECG - MUSE: NORMAL
MCH RBC QN AUTO: 31.3 PG (ref 26.5–33)
MCHC RBC AUTO-ENTMCNC: 33.8 G/DL (ref 31.5–36.5)
MCV RBC AUTO: 93 FL (ref 78–100)
P AXIS - MUSE: 32 DEGREES
PLATELET # BLD AUTO: 236 10E3/UL (ref 150–450)
POTASSIUM SERPL-SCNC: 3.7 MMOL/L (ref 3.4–5.3)
POTASSIUM SERPL-SCNC: 4 MMOL/L (ref 3.4–5.3)
PR INTERVAL - MUSE: 144 MS
PROT SERPL-MCNC: 5 G/DL (ref 6.4–8.3)
PROT SERPL-MCNC: 5.3 G/DL (ref 6.4–8.3)
QRS DURATION - MUSE: 88 MS
QT - MUSE: 430 MS
QTC - MUSE: 483 MS
R AXIS - MUSE: 23 DEGREES
RBC # BLD AUTO: 2.43 10E6/UL (ref 3.8–5.2)
SODIUM SERPL-SCNC: 132 MMOL/L (ref 135–145)
SODIUM SERPL-SCNC: 133 MMOL/L (ref 135–145)
SYSTOLIC BLOOD PRESSURE - MUSE: NORMAL MMHG
T AXIS - MUSE: 24 DEGREES
VENTRICULAR RATE- MUSE: 76 BPM
WBC # BLD AUTO: 14.4 10E3/UL (ref 4–11)

## 2025-02-07 PROCEDURE — 250N000013 HC RX MED GY IP 250 OP 250 PS 637: Performed by: OBSTETRICS & GYNECOLOGY

## 2025-02-07 PROCEDURE — 82565 ASSAY OF CREATININE: CPT | Performed by: OBSTETRICS & GYNECOLOGY

## 2025-02-07 PROCEDURE — G0378 HOSPITAL OBSERVATION PER HR: HCPCS

## 2025-02-07 PROCEDURE — 120N000001 HC R&B MED SURG/OB

## 2025-02-07 PROCEDURE — 36415 COLL VENOUS BLD VENIPUNCTURE: CPT | Performed by: OBSTETRICS & GYNECOLOGY

## 2025-02-07 PROCEDURE — 96361 HYDRATE IV INFUSION ADD-ON: CPT

## 2025-02-07 PROCEDURE — 258N000003 HC RX IP 258 OP 636: Performed by: OBSTETRICS & GYNECOLOGY

## 2025-02-07 PROCEDURE — 85041 AUTOMATED RBC COUNT: CPT | Performed by: OBSTETRICS & GYNECOLOGY

## 2025-02-07 PROCEDURE — 250N000011 HC RX IP 250 OP 636: Performed by: OBSTETRICS & GYNECOLOGY

## 2025-02-07 RX ORDER — HYDROCORTISONE 25 MG/G
CREAM TOPICAL 3 TIMES DAILY PRN
Status: DISCONTINUED | OUTPATIENT
Start: 2025-02-07 | End: 2025-02-11 | Stop reason: HOSPADM

## 2025-02-07 RX ORDER — METHYLERGONOVINE MALEATE 0.2 MG/ML
200 INJECTION INTRAVENOUS
Status: DISCONTINUED | OUTPATIENT
Start: 2025-02-07 | End: 2025-02-11 | Stop reason: HOSPADM

## 2025-02-07 RX ORDER — METOCLOPRAMIDE 10 MG/1
10 TABLET ORAL EVERY 6 HOURS PRN
Status: DISCONTINUED | OUTPATIENT
Start: 2025-02-07 | End: 2025-02-11 | Stop reason: HOSPADM

## 2025-02-07 RX ORDER — BISACODYL 10 MG
10 SUPPOSITORY, RECTAL RECTAL DAILY PRN
Status: DISCONTINUED | OUTPATIENT
Start: 2025-02-09 | End: 2025-02-11 | Stop reason: HOSPADM

## 2025-02-07 RX ORDER — ONDANSETRON 4 MG/1
4 TABLET, ORALLY DISINTEGRATING ORAL EVERY 6 HOURS PRN
Status: DISCONTINUED | OUTPATIENT
Start: 2025-02-07 | End: 2025-02-11 | Stop reason: HOSPADM

## 2025-02-07 RX ORDER — ACETAMINOPHEN 325 MG/1
975 TABLET ORAL EVERY 6 HOURS
Status: DISCONTINUED | OUTPATIENT
Start: 2025-02-07 | End: 2025-02-10

## 2025-02-07 RX ORDER — IBUPROFEN 800 MG/1
800 TABLET, FILM COATED ORAL EVERY 6 HOURS PRN
Status: DISCONTINUED | OUTPATIENT
Start: 2025-02-07 | End: 2025-02-07

## 2025-02-07 RX ORDER — OXYCODONE HYDROCHLORIDE 5 MG/1
5 TABLET ORAL EVERY 4 HOURS PRN
Status: DISCONTINUED | OUTPATIENT
Start: 2025-02-07 | End: 2025-02-11 | Stop reason: HOSPADM

## 2025-02-07 RX ORDER — ENOXAPARIN SODIUM 100 MG/ML
40 INJECTION SUBCUTANEOUS EVERY 12 HOURS
Status: DISCONTINUED | OUTPATIENT
Start: 2025-02-07 | End: 2025-02-11 | Stop reason: HOSPADM

## 2025-02-07 RX ORDER — NALOXONE HYDROCHLORIDE 0.4 MG/ML
0.4 INJECTION, SOLUTION INTRAMUSCULAR; INTRAVENOUS; SUBCUTANEOUS
Status: DISCONTINUED | OUTPATIENT
Start: 2025-02-07 | End: 2025-02-11 | Stop reason: HOSPADM

## 2025-02-07 RX ORDER — LOPERAMIDE HYDROCHLORIDE 2 MG/1
4 CAPSULE ORAL
Status: DISCONTINUED | OUTPATIENT
Start: 2025-02-07 | End: 2025-02-11 | Stop reason: HOSPADM

## 2025-02-07 RX ORDER — AMOXICILLIN 250 MG
1 CAPSULE ORAL 2 TIMES DAILY
Status: DISCONTINUED | OUTPATIENT
Start: 2025-02-07 | End: 2025-02-11 | Stop reason: HOSPADM

## 2025-02-07 RX ORDER — CARBOPROST TROMETHAMINE 250 UG/ML
250 INJECTION, SOLUTION INTRAMUSCULAR
Status: DISCONTINUED | OUTPATIENT
Start: 2025-02-07 | End: 2025-02-11 | Stop reason: HOSPADM

## 2025-02-07 RX ORDER — ONDANSETRON 2 MG/ML
4 INJECTION INTRAMUSCULAR; INTRAVENOUS EVERY 6 HOURS PRN
Status: DISCONTINUED | OUTPATIENT
Start: 2025-02-07 | End: 2025-02-11 | Stop reason: HOSPADM

## 2025-02-07 RX ORDER — DEXTROSE, SODIUM CHLORIDE, SODIUM LACTATE, POTASSIUM CHLORIDE, AND CALCIUM CHLORIDE 5; .6; .31; .03; .02 G/100ML; G/100ML; G/100ML; G/100ML; G/100ML
INJECTION, SOLUTION INTRAVENOUS CONTINUOUS
Status: DISCONTINUED | OUTPATIENT
Start: 2025-02-07 | End: 2025-02-11 | Stop reason: HOSPADM

## 2025-02-07 RX ORDER — SODIUM PHOSPHATE,MONO-DIBASIC 19G-7G/118
1 ENEMA (ML) RECTAL DAILY PRN
Status: DISCONTINUED | OUTPATIENT
Start: 2025-02-09 | End: 2025-02-11 | Stop reason: HOSPADM

## 2025-02-07 RX ORDER — NALOXONE HYDROCHLORIDE 0.4 MG/ML
0.2 INJECTION, SOLUTION INTRAMUSCULAR; INTRAVENOUS; SUBCUTANEOUS
Status: DISCONTINUED | OUTPATIENT
Start: 2025-02-07 | End: 2025-02-11 | Stop reason: HOSPADM

## 2025-02-07 RX ORDER — SIMETHICONE 80 MG
80 TABLET,CHEWABLE ORAL 4 TIMES DAILY PRN
Status: DISCONTINUED | OUTPATIENT
Start: 2025-02-07 | End: 2025-02-11 | Stop reason: HOSPADM

## 2025-02-07 RX ORDER — OXYTOCIN 10 [USP'U]/ML
10 INJECTION, SOLUTION INTRAMUSCULAR; INTRAVENOUS
Status: DISCONTINUED | OUTPATIENT
Start: 2025-02-07 | End: 2025-02-11 | Stop reason: HOSPADM

## 2025-02-07 RX ORDER — OXYTOCIN/0.9 % SODIUM CHLORIDE 30/500 ML
100-340 PLASTIC BAG, INJECTION (ML) INTRAVENOUS CONTINUOUS PRN
Status: DISCONTINUED | OUTPATIENT
Start: 2025-02-07 | End: 2025-02-11 | Stop reason: HOSPADM

## 2025-02-07 RX ORDER — METOCLOPRAMIDE HYDROCHLORIDE 5 MG/ML
10 INJECTION INTRAMUSCULAR; INTRAVENOUS EVERY 6 HOURS PRN
Status: DISCONTINUED | OUTPATIENT
Start: 2025-02-07 | End: 2025-02-11 | Stop reason: HOSPADM

## 2025-02-07 RX ORDER — PROCHLORPERAZINE MALEATE 10 MG
10 TABLET ORAL EVERY 6 HOURS PRN
Status: DISCONTINUED | OUTPATIENT
Start: 2025-02-07 | End: 2025-02-11 | Stop reason: HOSPADM

## 2025-02-07 RX ORDER — MISOPROSTOL 200 UG/1
400 TABLET ORAL
Status: DISCONTINUED | OUTPATIENT
Start: 2025-02-07 | End: 2025-02-11 | Stop reason: HOSPADM

## 2025-02-07 RX ORDER — LIDOCAINE 40 MG/G
CREAM TOPICAL
Status: DISCONTINUED | OUTPATIENT
Start: 2025-02-07 | End: 2025-02-11 | Stop reason: HOSPADM

## 2025-02-07 RX ORDER — ACETAMINOPHEN 325 MG/1
975 TABLET ORAL EVERY 4 HOURS PRN
Status: DISCONTINUED | OUTPATIENT
Start: 2025-02-07 | End: 2025-02-07

## 2025-02-07 RX ORDER — MISOPROSTOL 200 UG/1
800 TABLET ORAL
Status: DISCONTINUED | OUTPATIENT
Start: 2025-02-07 | End: 2025-02-11 | Stop reason: HOSPADM

## 2025-02-07 RX ORDER — AMOXICILLIN 250 MG
2 CAPSULE ORAL 2 TIMES DAILY
Status: DISCONTINUED | OUTPATIENT
Start: 2025-02-07 | End: 2025-02-11 | Stop reason: HOSPADM

## 2025-02-07 RX ORDER — KETOROLAC TROMETHAMINE 30 MG/ML
30 INJECTION, SOLUTION INTRAMUSCULAR; INTRAVENOUS EVERY 6 HOURS
Status: DISCONTINUED | OUTPATIENT
Start: 2025-02-07 | End: 2025-02-07

## 2025-02-07 RX ORDER — OXYCODONE HYDROCHLORIDE 5 MG/1
5-10 TABLET ORAL EVERY 6 HOURS PRN
Status: DISCONTINUED | OUTPATIENT
Start: 2025-02-07 | End: 2025-02-11 | Stop reason: HOSPADM

## 2025-02-07 RX ORDER — LOPERAMIDE HYDROCHLORIDE 2 MG/1
2 CAPSULE ORAL
Status: DISCONTINUED | OUTPATIENT
Start: 2025-02-07 | End: 2025-02-11 | Stop reason: HOSPADM

## 2025-02-07 RX ORDER — TRANEXAMIC ACID 10 MG/ML
1 INJECTION, SOLUTION INTRAVENOUS EVERY 30 MIN PRN
Status: DISCONTINUED | OUTPATIENT
Start: 2025-02-07 | End: 2025-02-11 | Stop reason: HOSPADM

## 2025-02-07 RX ORDER — IBUPROFEN 800 MG/1
800 TABLET, FILM COATED ORAL EVERY 6 HOURS
Status: DISCONTINUED | OUTPATIENT
Start: 2025-02-08 | End: 2025-02-08

## 2025-02-07 RX ORDER — MODIFIED LANOLIN
OINTMENT (GRAM) TOPICAL
Status: DISCONTINUED | OUTPATIENT
Start: 2025-02-07 | End: 2025-02-11 | Stop reason: HOSPADM

## 2025-02-07 RX ORDER — OXYTOCIN/0.9 % SODIUM CHLORIDE 30/500 ML
340 PLASTIC BAG, INJECTION (ML) INTRAVENOUS CONTINUOUS PRN
Status: DISCONTINUED | OUTPATIENT
Start: 2025-02-07 | End: 2025-02-11 | Stop reason: HOSPADM

## 2025-02-07 RX ORDER — ACETAMINOPHEN 325 MG/1
975 TABLET ORAL EVERY 6 HOURS
Status: DISCONTINUED | OUTPATIENT
Start: 2025-02-07 | End: 2025-02-07 | Stop reason: HOSPADM

## 2025-02-07 RX ORDER — IBUPROFEN 800 MG/1
800 TABLET, FILM COATED ORAL EVERY 6 HOURS
Status: DISCONTINUED | OUTPATIENT
Start: 2025-02-07 | End: 2025-02-11 | Stop reason: HOSPADM

## 2025-02-07 RX ADMIN — BUPROPION HYDROCHLORIDE 300 MG: 300 TABLET, EXTENDED RELEASE ORAL at 08:10

## 2025-02-07 RX ADMIN — SODIUM CHLORIDE, POTASSIUM CHLORIDE, SODIUM LACTATE AND CALCIUM CHLORIDE 100 ML/HR: 600; 310; 30; 20 INJECTION, SOLUTION INTRAVENOUS at 13:05

## 2025-02-07 RX ADMIN — SENNOSIDES AND DOCUSATE SODIUM 1 TABLET: 50; 8.6 TABLET ORAL at 13:12

## 2025-02-07 RX ADMIN — IBUPROFEN 800 MG: 800 TABLET ORAL at 06:54

## 2025-02-07 RX ADMIN — ACETAMINOPHEN 975 MG: 325 TABLET ORAL at 19:24

## 2025-02-07 RX ADMIN — SENNOSIDES AND DOCUSATE SODIUM 2 TABLET: 50; 8.6 TABLET ORAL at 22:16

## 2025-02-07 RX ADMIN — SODIUM CHLORIDE, POTASSIUM CHLORIDE, SODIUM LACTATE AND CALCIUM CHLORIDE 100 ML/HR: 600; 310; 30; 20 INJECTION, SOLUTION INTRAVENOUS at 03:46

## 2025-02-07 RX ADMIN — ASPIRIN 81 MG CHEWABLE TABLET 81 MG: 81 TABLET CHEWABLE at 08:10

## 2025-02-07 RX ADMIN — IBUPROFEN 800 MG: 800 TABLET ORAL at 13:12

## 2025-02-07 RX ADMIN — ACETAMINOPHEN 975 MG: 325 TABLET ORAL at 08:10

## 2025-02-07 RX ADMIN — IBUPROFEN 800 MG: 800 TABLET ORAL at 19:24

## 2025-02-07 RX ADMIN — ACETAMINOPHEN 975 MG: 325 TABLET ORAL at 13:12

## 2025-02-07 RX ADMIN — ENOXAPARIN SODIUM 40 MG: 40 INJECTION SUBCUTANEOUS at 22:38

## 2025-02-07 RX ADMIN — LEVOTHYROXINE SODIUM 25 MCG: 0.03 TABLET ORAL at 08:10

## 2025-02-07 ASSESSMENT — ACTIVITIES OF DAILY LIVING (ADL)
ADLS_ACUITY_SCORE: 30
ADLS_ACUITY_SCORE: 30
ADLS_ACUITY_SCORE: 20
ADLS_ACUITY_SCORE: 16
ADLS_ACUITY_SCORE: 22
ADLS_ACUITY_SCORE: 16
ADLS_ACUITY_SCORE: 30
ADLS_ACUITY_SCORE: 22
ADLS_ACUITY_SCORE: 20
ADLS_ACUITY_SCORE: 30
ADLS_ACUITY_SCORE: 30
ADLS_ACUITY_SCORE: 22
ADLS_ACUITY_SCORE: 16
ADLS_ACUITY_SCORE: 22
ADLS_ACUITY_SCORE: 20
ADLS_ACUITY_SCORE: 16
ADLS_ACUITY_SCORE: 22
ADLS_ACUITY_SCORE: 30
ADLS_ACUITY_SCORE: 16
ADLS_ACUITY_SCORE: 22
ADLS_ACUITY_SCORE: 30
ADLS_ACUITY_SCORE: 30
ADLS_ACUITY_SCORE: 20

## 2025-02-07 NOTE — PROGRESS NOTES
Sourav SAMUELS and significant other were welcome and oriented to the room and unit.  The fellowing topics were explained in details:  Fundal check with Labor and delivery RN  Narendra Archer .Baby safety bands verified with Parents.location of unit kitchen for Patient and Family for water/ snacks and microwave.  Room orientation : Call lights, bathroom and the need to call for assistance to use the bathroom.  Voiding into the hat for 2 measured void after she is stable and [place] removed. The availability of using the connor bottle and fresh sanitary pad. Calling for any abnormality like excess / heavy bleeding and passing large clots.  Admission booklet, folders , PPMA, Birth Certificate and the need for ROP,   Menu and when to call at 0630.  24 hour testing  for Baby: CCHD, Hearing, Bili, Metabolic screen, weight, removal of cord clamp , foot print. Car Seat .  How often staff will be coming to assess Patient and Baby.   Discharge Goals / Potential discharge date / time   Calling for Breastfeeding help and the importance of early pumping. / Lactation consult placed.  Availability of Pain medication for comfort.   Questions were welcome and addressed. Sandra Shepherd RN

## 2025-02-07 NOTE — ANESTHESIA PROCEDURE NOTES
TAP Procedure Note    Pre-Procedure   Staff -        Anesthesiologist:  Javed Bartholomew MD       Performed By: anesthesiologist       Procedure Start/Stop Times: 2/6/2025 9:11 PM       Pre-Anesthestic Checklist: patient identified, IV checked, site marked, risks and benefits discussed, informed consent, monitors and equipment checked, pre-op evaluation, at physician/surgeon's request and post-op pain management  Timeout:       Correct Patient: Yes        Correct Procedure: Yes        Correct Site: Yes        Correct Position: Yes        Correct Laterality: Yes        Site Marked: Yes  Procedure Documentation  Procedure: TAP         Laterality: bilateral       Patient Position: supine       Skin prep: Chloraprep       Needle Type: short bevel       Needle Gauge: 20.        Needle Length (Inches): 4        Ultrasound guided       1. Ultrasound was used to identify targeted nerve, plexus, vascular marker, or fascial plane and place a needle adjacent to it in real-time.       2. Ultrasound was used to visualize the spread of anesthetic in close proximity to the above referenced structure.       3. A permanent image is entered into the patient's record.       4. The visualized anatomic structures appeared normal.       5. There were no apparent abnormal pathologic findings.    Assessment/Narrative         The placement was negative for: blood aspirated, painful injection and site bleeding       Paresthesias: No.       Bolus given via needle..        Secured via.        Insertion/Infusion Method: Single Shot       Complications: none       Injection made incrementally with aspirations every 5 mL.    Medication(s) Administered   Bupivacaine 0.25% PF (Infiltration) - Infiltration   30 mL - 2/6/2025 9:11:00 PM  Bupivacaine liposome (Exparel) 1.3% LA inj susp (Infiltration) - Infiltration   20 mL - 2/6/2025 9:11:00 PM  Medication Administration Time: 2/6/2025 9:11 PM      FOR [facility] ([facility]) ONLY:   Pain Team Contact  "information: please page the Pain Team Via [facility]. Search \"Pain\". During daytime hours, please page the attending first. At night please page the resident first.      "

## 2025-02-07 NOTE — SIGNIFICANT EVENT
Significant Event Note    Time of event: 11:39 PM 2025    Description of event:  Rapid response event called for hypotension  Patient underwent  earlier this evening with Dr. Bernard for breech presentation  Of note, patient did have attempted and failed ECV earlier today  Patient admitted initially for IOL for pre-ecclampsia with severe features and elevated blood pressures    Rapid response event called for hypotension  Patient's hypotensive to SBP in the 60s  Appears cold and clammy, however alert and oriented x 3  Patient declining any pain, however did feel little dizzy earlier  No chest pain, shortness of breath, worsening abdominal pain outside of standard  discomfort and incision, otherwise no other symptoms endorsed    BP at bedside on my arrival 70s over 50s  Heart rate high 90s, however satting well on room air and conversing well  No acute respiratory distress    GEN: Alert and oriented x 3, no acute distress  CV: RRR, S1 and S2 present  Resp: CTAB bilaterally  Abdomen: Incision dressing appears clean and dry without obvious signs of exsanguination or bleeding.  Abdomen overall soft, nontender aside from incision site, nondistended, BS present x 4  Extremities: Mild edema, but no worse than previous    EKG at bedside showing normal sinus rhythm, no [id number] changes or T wave changes    Plan:  Patient placed in Trendelenburg, second IV site placed and LR bolus running  BP improved after a few minutes of LR  Unclear what caused hypotension at this time given patient recently got several medications in the OR  Will order CMP, CBC at this time, patient already consented for blood  Will hold off on additional antihypertensives at this time, keep mag on at low-dose to prevent risk of seizure per Dr. Bernard  Will monitor closely    Discussed with: bedside nurse and Dr. Joana Mackenzie MD     back into the 80's SBP  No worsening symptoms, will monitor closely  Will keep on mIVF for the evening  Will hold off on imaging for now, low threshold to image    Discussed with: bedside nurse and Dr. Joana Mackenzie MD

## 2025-02-07 NOTE — PROGRESS NOTES
Called to the room as a rapid response was being called on this patient.  Her blood pressure had dropped to 59/28.  She had been on magnesium 1 g and did not receive any antihypertensives since earlier today.  She was feeling a little bit dizzy when she tried to sit up.  Her lochia is small and minimal was on the pad.  Her uterus was firm.  The incision was clean dry and intact.  She denies any headache or visual changes or significant postop pain.  She was placed in Trendelenburg and given IV fluid boluses by the rapid response team and the blood pressure came up to the 100s over 60s and 70s.  Her pulse was in the 70s and 80s throughout and her temperature is 98.2 and her oxygen saturation on room air ranged between 96 and 99%.  Labs were drawn and are pending.  Assuming they are normal then this was likely just a response to being postop and fluid shifting from blood loss from delivery.  Magnesium had been turned off during the beginning of the rapid response as she responded return to magnesium back on for seizure prophylaxis.

## 2025-02-07 NOTE — PROVIDER NOTIFICATION
02/07/25 0600   Provider Notification   Provider Name/Title Dr. Joana Lowe   Method of Notification In Department   Request Evaluate-Remote   Notification Reason Status Update;Other  (Leave snyder in if Patient is unable to walk to the bathroom., assisted Patient out of bed with assist of 2, She was able to stand up but unable to walk. Felt dizzy and lots of pain.)

## 2025-02-07 NOTE — PLAN OF CARE
Vitals afebrile and C/Section assessment are within normal limit.   Vitals:    02/07/25 0034 02/07/25 0044 02/07/25 0049 02/07/25 0054     Weight:         She continue to receive Magnesium sulfate 1 gram 25 ml every hour and  ml every hour. LR bolus completed. She denies Headache, visual changes, epigastric pain , N+V or SOB. 2 + reflexes with no clonus. BP's has been stable,every 10 minutes , every 30 minutes ,  continue to be on hourly BP, Lung sounds clear, Hypoactive bowel sounds. Fundus firm at 1 below umbilicus with very scant lochia rubra. Lower abdominal incision  dressing has old dry drainage marked unchanged     Plan is to dangled and ambulated in room with assist of 2 if she continue to remain stable and Remove Snyder. Pericare/ snyder care done. Linen changed. Snyder was draining Clear wes urine.   Lactation consult placed.She denies pain. AM labs to be drawn at 0600 .  Significant other at bedside supporting Mom. Parents are loving and attentive towards baby.  Plan : Assist Mom with Breastfeeding and to pump.    Goal Outcome Evaluation:   Dangle Patient at bedside. She stood at bedside but unable to walk. Had one elevated BP in 150's /80's DR. Bernard notified. Will continue to observe Pt vitals closely. . Weight check deferred till later. Sandra Shepherd, RN        Plan of Care Reviewed With: patient    Overall Patient Progress: improvingOverall Patient Progress: improving

## 2025-02-07 NOTE — CARE PLAN
Data: Sourav Mascorro transferred to [facility]/LVKD06-8 via bed. Patient transferred to Postpartum with .    Action: Receiving unit notified of transfer. Patient and support person notified of room change. Patient and belongings accompanied by Registered Nurse. Bedside report given to Sandra ROGERS RN. Fundal check performed with receiving RN. Oriented patient to surroundings. Call light within reach.    Response: Patient tolerated transfer.    Narendra Archer RN  2025 12:31 AM

## 2025-02-07 NOTE — ANESTHESIA CARE TRANSFER NOTE
Patient: Sourav Mascorro    Procedure: Procedure(s):   SECTION       Diagnosis: Malposition of fetus, single or unspecified fetus [O32.9XX0]  Diagnosis Additional Information: No value filed.    Anesthesia Type:   Spinal     Note:    Oropharynx: oropharynx clear of all foreign objects and spontaneously breathing  Level of Consciousness: awake  Oxygen Supplementation: room air    Independent Airway: airway patency satisfactory and stable  Dentition: dentition unchanged  Vital Signs Stable: post-procedure vital signs reviewed and stable  Report to RN Given: handoff report given  Patient transferred to: Labor and Delivery    Handoff Report: Identifed the Patient, Identified the Reponsible Provider, Reviewed the pertinent medical history, Discussed the surgical course, Reviewed Intra-OP anesthesia mangement and issues during anesthesia, Set expectations for post-procedure period and Allowed opportunity for questions and acknowledgement of understanding      Vitals:  Vitals Value Taken Time   /81 25   Temp 98.1 25   Pulse 115 25   Resp 16 25   SpO2 98 25       Electronically Signed By: SHELDON Mckeon CRNA  2025  9:37 PM

## 2025-02-07 NOTE — OP NOTE
NAME:  Sourav Mascorro     DATE OF SERVICE: 2025     PREOPERATIVE DIAGNOSIS: 35 weeks, severe preeclampsia, breech, failed external cephalic version    POSTOPERATIVE DIAGNOSIS: Same    PROCEDURE: Primary low transverse  section      SURGEON:  Quentin Bernard MD     ASSISTANT: Staff assist    ANESTHESIA: Spinal    QBL Delivery QBL (mL): 711    DRAINS: Hays catheter.    COMPLICATIONS: Uterine atony    FINDINGS: Normal uterus, tubes and ovaries bilateral. Normal appearance to the adnexae.  Live female infant born with Apgars of 8 at one minute, 8 at 5 minutes,  weight unavailable at time of dictation.    PROCEDURE:  Patient was met preoperatively where we discussed the procedure and the risks associated with the procedure.  She understood these to include but not limited to injury to adjacent organs including bowel, bladder, ureter, infection and bleeding. Understanding these risks her consents were signed.      She was brought to the operating room in stable condition.  After induction of a spinal, fetal heart tones were checked and were stable. She was carefully prepped and draped in sterile fashion for the procedure.  A timeout was then performed.      A Pfannenstiel skin incision was made and carried down to the rectus fascia which was incised in the midline and carried out bilaterally.  The superior and inferior aspects of the rectus fascia were elevated up and the underlying rectus muscles dissected off with sharp and blunt techniques.  The rectus muscles were now  at the midline and the peritoneum was identified and entered sharply.  This incision was then extended.  A bladder blade was introduced. The vesicouterine peritoneum was identified and a bladder flap was created.  A low transverse uterine incision was made revealing clear amniotic fluid baby was in jovi breech presentation.  The breech was delivered through the incision followed by the legs and then the body up to the  nipple line.  The arms were delivered sequentially and the head was delivered maintaining flexion at the neck.  Baby was crying so delayed cord clamping was done after 45 seconds. The cord was clamped x 2 and cut and the infant handed off to waiting nursing personnel.    The placenta was then manually removed from the uterus.  The uterus was exteriorized, covered with a moist laparotomy sponge and cleared of all clots and debris.  Uterine atony was encountered and treated with massage and IV Pitocin along with intramyometrial Pitocin.  The patient had also received TXA prior to surgery.  The uterine incision was now closed with 0 Vicryl from both angles in a running locking suture. I imbricated the incision with a layer of 0 Monocryl.  2 extra sutures of 3-0 Vicryl were required at the middle incision for adequate hemostasis.  The bladder flap was left to heal by secondary intention. The uterus was returned to the abdominopelvic cavity.  The pericolic gutters were cleared of all clots and debris.  The uterus was again inspected and noted to be hemostatic. The fascia was brought together with looped O PDS. The subcutaneous tissues were irrigated, made hemostatic with use of electrocautery and 3-0 plain gut.  Skin was closed with 4-0 Monocryl.  Patient tolerated this procedure well.  Sponge, lap and needle counts were correct x two.      Quentin Bernard MD     CC:

## 2025-02-07 NOTE — PROVIDER NOTIFICATION
02/07/25 0600 02/07/25 0601   Provider Notification   Provider Name/Title Dr. Joana Lowe   Method of Notification In Department In Department   Request Evaluate-Remote Evaluate-Remote   Notification Reason Status Update;Other  (Leave snyder in if Patient is unable to walk to the bathroom., assisted Patient out of bed with assist of 2, She was able to stand up but unable to walk. Felt dizzy and lots of pain.) Vital Signs Change;Status Update  (BP of 154/85, Continue to Observe Patient Vitals closely.Notify Provider per orders.)

## 2025-02-07 NOTE — ANESTHESIA POSTPROCEDURE EVALUATION
Patient: Sourav Mascorro    Procedure: Procedure(s):   SECTION       Anesthesia Type:  Spinal    Note:  Disposition: Inpatient   Postop Pain Control: Uneventful            Sign Out: Well controlled pain   PONV: No   Neuro/Psych: Uneventful            Sign Out: Acceptable/Baseline neuro status   Airway/Respiratory: Uneventful            Sign Out: Acceptable/Baseline resp. status   CV/Hemodynamics: Uneventful            Sign Out: Acceptable CV status; No obvious hypovolemia; No obvious fluid overload   Other NRE: NONE   DID A NON-ROUTINE EVENT OCCUR? No           Last vitals:  Vitals:    25   BP: (!) 170/88     Pulse:      Resp:      Temp: 36.7  C (98.1  F)     SpO2: 99% 100% 99%       Electronically Signed By: Javed Bartholomew MD  2025  9:57 PM

## 2025-02-07 NOTE — PROGRESS NOTES
Progress Note    Doing well. No complaints. No PIH symptoms. Normal lochia.     Temp:  [98  F (36.7  C)-98.8  F (37.1  C)] 98  F (36.7  C)  Pulse:  [76-99] 76  Resp:  [15-22] 16  BP: ()/() 130/78  SpO2:  [91 %-100 %] 99 %    NAD, AAO x 3  Abdomen soft, appropriate post op discomfort  Dressing clean and dry   No c/c/e    Hgb: 10.2 --> 8.6 --> 7.6    A/P: 33 yo POD #1 s/p primary C/S for breech  -- Pre eclampsia with severe features. On magnesium sulfate. Did have an episode of hypotension overnight. One elevated BP this morning of 154/85. Other Bps normotensive. Discussed possibly needing to start oral medication. Will monitor today.   -- Acute blood loss anemia. Will check Hgb in the morning. Discussed possible blood transfusion vs. IV iron. Will evaluate tomorrow.   -- Routine post op care    Irma Gross MD, FACOG  P) [phone]

## 2025-02-07 NOTE — PLAN OF CARE
"Goal Outcome Evaluation:     Plan of Care Reviewed With: patient    Overall Patient Progress: improvingOverall Patient Progress: improving    Outcome Evaluation: Improving towards discharge    Pain managed with Tylenol and Ibuprofen. Abdominal binder applied.    Tolerating regular diet.     IV Magnesium and LR infusing.     Started breastfeeding and pumping. Declined pumping bra with first pumping session; has at bedside.    Hgb 7.6 this morning. MD updated. Recheck in AM.    Has been holding baby but unable to perform  cares d/t \"feeling not well\" from IV Mg infusion.    Spouse at the bedside supportive. Helping with all baby cares.    Sat at the edge of bed and stood. Unable to ambulate. Patient stated she felt \"unsteady and not strong enough to walk.\" MD updated and obtained order to keep snyder catheter in place until IV Mg discontinued tonight.    Needs to complete birth certificate and mood assessment; discussed these.  "

## 2025-02-07 NOTE — CONSULTS
Neonatology Antepartum Counseling Consult    I was asked to provide antepartum counseling for Sourav Mascorro at the request of Dr. Bernard secondary to  gestational age. Ms. Mascorro is currently 35.2 weeks and has a hx significant for GHTN with superimposed pre-eclampsia with severe features on nifedipine and magnesium, autoimmune hypothyroidism on Synthroid, depression on Wellbutrin, obesity, anemia, elevated liver enzymes-borderline fatty liver disease, Rh Negative- Rhogam 24, breech positioning, and GBS unknown/pending. Need for mIOL given severe hypertension. Unsuccessful ECV, plan to proceed with primary  section this evening. Betamethasone was not done.     Ms. Mascorro, accompanied by her  David, was counseled on the expected hospital course, potential risks, and outcomes associated with an infant born at approximately 35 weeks gestation. The counseling included: initial delivery room stabilization, respiratory course, nutrition, glucose management, thermoregulation, growth and development, possible need for NICU admission, and long term outcomes. Mother plans to breast feed. Encouraged pumping and bottle supplementation until breast feeds and milk supply are established. If glucoses are not responsive to maternal/donor milk, recommended supplementing maternal milk with Neosure 22 kcal/oz formula. Please feel free to call with any additional questions or concerns.      Floor Time (min): 5  Face to Face Time (min): 15  Total Time (minutes): 25  More than 50% of my time was spent in direct, face to face, antepartum counseling with the above patient.    SHELDON Pastor, CNP 6:18 PM 2025   Advanced Practice Provider  [facility]

## 2025-02-07 NOTE — PLAN OF CARE
Goal Outcome Evaluation:      Plan of Care Reviewed With: patient    Overall Patient Progress: improvingOverall Patient Progress: improving    Outcome Evaluation: Baby girl born via primary ceserean section due to breech by Dr. Bernard. Apgars 8 and 8 at one and five minutes of life. Baby bought to warmer for Delivery team assessment.       Yes

## 2025-02-07 NOTE — PROGRESS NOTES
RT attended delivery. Baby received CPAP and O2 for about 16 minutes. Baby weaned off O2 down to 21% FiO2 and off CPAP. Baby doing well and sats were in mid 90's when interventions were done.

## 2025-02-07 NOTE — PROGRESS NOTES
Returned form or to room 4. Sitting upright holding baby. Attempting breast feeding. Denies any pain

## 2025-02-07 NOTE — LACTATION NOTE
This note was copied from a baby's chart.  Initial Lactation Visit    Current age: 14 hours old    Gestational age at delivery: 35w2d     Diaper count (last 24 hours): 2 wet 2 soiled Meconium     Feedings (last 24 hours): 0 breast  7 Human donor breastmilk 5-15 mL    Weight Loss:     Breastfeeding goals: 6-12 months    Past breastfeeding experience: NA     Maternal risk that may affect breastfeeding: Obesity, Pre-eclampsia    Home Pump:     Breast assessment: Breast: asymmetrical Conical and Wide Spaced,  Nipples: Everted and Short and small     Infant oral assessment: Oral: Small mouth and thin cheek pads      Feeding assessment:  Infant was very sleepy.   Attempted to latch infant place mouth over nipple and slept.   LC made plans to return at next feeding.  Floor RN to set up breast pump.           Lactation returned. With a nipple shield in place and in football hold infant latched and had a short burst and went to sleep.       Feeding plan:     Breastfeeding Care Plan for Late /Early Term/Low Birth Weight Baby         Feed every 2-3 hours. Keep breastfeeding efficient. If infant does not latch within 5-10 minutes, or infant sleepy at breast, or not transferring milk then end feeding at breast.   Use nipple shield.    Positioning reminders:  line up baby's nose to nipple   ear, shoulder, hip, nice straight line   chin off bay's chest; chin touching your breast prior to latch  your thumb lined up like baby's mustache, fingers under breast like a baby's beard  cheeks touching breast  Signs of milk transfer: hearing swallows, comfortable latch, meeting output goals and softening of breasts.   Supplement baby after every breastfeeding with colostrum/breastmilk ( If colostrum/breastmilk is not available, then donor milk or formula may be used) Use below as a guideline; give more as baby cues.    0-48 hours 5-15 ml each feeding  48-72 hours 15-30 ml each feeding  72-96 hours 30-60 ml each feeding  96+ hours         and older follow healthcare providers recommendation      Education:   [x] [place] Feeding Patterns in the First Few Days/second Night     [x] Maternal Risk Factors that Could Affect Milk Supply      [x] Hand Expression              [x] LPI Feeding Behaviors  [x] LBW Feeding Behaviors                  [x] Breastfeeding Positions          [] Tips to Maintain a Deep Latch               [x] Nutritive vs Non-Nutritive Sucking             [x] Pumping Plan             [x] Inpatient Lactation Support          Follow up: LC will continue to follow up during hospital stay.

## 2025-02-07 NOTE — PROGRESS NOTES
Respiratory responded to rapid response. Patient on room air sating 99%. No respiratory therapies needed at this time.

## 2025-02-07 NOTE — PROVIDER NOTIFICATION
02/07/25 0730   Provider Notification   Provider Name/Title Renato   Method of Notification At Bedside   Request Evaluate in Person   Notification Reason Status Update  (Update on labs/order requests)     IV Magnesium stop order obtained for 24 hours after delivery.    Tylenol and Ibuprofen order obtained from PRN to scheduled.     Order obtained to discontinue IV Penicillin.    Updated on Hemoglobin result of 7.6.    Updated that snyder catheter remains in place and was dizzy with last sitting attempt.

## 2025-02-07 NOTE — ADDENDUM NOTE
Addendum  created 02/06/25 9704 by Javed Bartholomew MD    Child order released for a procedure order, Clinical Note Signed, Intraprocedure Blocks edited, SmartForm saved

## 2025-02-07 NOTE — ANESTHESIA PROCEDURE NOTES
"Intrathecal injection Procedure Note    Pre-Procedure   Staff -        Anesthesiologist:  Javed Bartholomew MD       Performed By: anesthesiologist       Location: OB       Procedure Start/Stop Times: 2/6/2025 8:00 PM and 2/6/2025 8:13 PM       Pre-Anesthestic Checklist: patient identified, IV checked, risks and benefits discussed, informed consent, monitors and equipment checked, pre-op evaluation, at physician/surgeon's request and post-op pain management  Timeout:       Correct Patient: Yes        Correct Procedure: Yes        Correct Site: Yes        Correct Position: Yes   Procedure Documentation  Procedure: intrathecal injection         Patient Position: sitting       Skin prep: Chloraprep       Insertion Site: L2-3. (midline approach).       Needle Gauge: 25.        Needle Length (Inches): 5        Spinal Needle Type: Pencan       Introducer used       Introducer: 20 G       # of attempts: 3 and  # of redirects:  0    Assessment/Narrative         Paresthesias: No.       CSF fluid: clear.       Opening pressure was cmH2O while  Sitting.      Medication(s) Administered   0.75% Hyperbaric Bupivacaine (Intrathecal) - Intrathecal   1.5 mL - 2/6/2025 8:00:00 PM  Fentanyl PF (Intrathecal) - Intrathecal   10 mcg - 2/6/2025 8:00:00 PM  Morphine PF 1 mg/mL (Intrathecal) - Intrathecal   0.15 mg - 2/6/2025 8:00:00 PM  Medication Administration Time: 2/6/2025 8:00 PM      FOR [facility] ([facility] [place]) ONLY:   Pain Team Contact information: please page the Pain Team Via [employer]. Search \"Pain\". During daytime hours, please page the attending first. At night please page the resident first.      "

## 2025-02-07 NOTE — PROGRESS NOTES
Feeling dizzy, no headache or pain. Looked very pale. Patient was sitting up in bed.Put her head down .    Magnesium sulfate stopped at 2315   Rapid response called at 2322                                                 Blood pressure down to 59/28. @2323.                                         CMP, CBC ordered stat, rainbow also.

## 2025-02-08 LAB
ALBUMIN SERPL BCG-MCNC: 2.8 G/DL (ref 3.5–5.2)
ALP SERPL-CCNC: 104 U/L (ref 40–150)
ALT SERPL W P-5'-P-CCNC: 38 U/L (ref 0–50)
ANION GAP SERPL CALCULATED.3IONS-SCNC: 6 MMOL/L (ref 7–15)
AST SERPL W P-5'-P-CCNC: 27 U/L (ref 0–45)
BILIRUB SERPL-MCNC: <0.2 MG/DL
BLD PROD TYP BPU: NORMAL
BLOOD COMPONENT TYPE: NORMAL
BUN SERPL-MCNC: 6.3 MG/DL (ref 6–20)
CALCIUM SERPL-MCNC: 7.4 MG/DL (ref 8.8–10.4)
CHLORIDE SERPL-SCNC: 105 MMOL/L (ref 98–107)
CODING SYSTEM: NORMAL
CREAT SERPL-MCNC: 0.87 MG/DL (ref 0.51–0.95)
CROSSMATCH: NORMAL
EGFRCR SERPLBLD CKD-EPI 2021: 89 ML/MIN/1.73M2
ERYTHROCYTE [DISTWIDTH] IN BLOOD BY AUTOMATED COUNT: 13.4 % (ref 10–15)
ERYTHROCYTE [DISTWIDTH] IN BLOOD BY AUTOMATED COUNT: 13.5 % (ref 10–15)
GLUCOSE SERPL-MCNC: 115 MG/DL (ref 70–99)
HCO3 SERPL-SCNC: 25 MMOL/L (ref 22–29)
HCT VFR BLD AUTO: 17.9 % (ref 35–47)
HCT VFR BLD AUTO: 22.5 % (ref 35–47)
HGB BLD-MCNC: 6 G/DL (ref 11.7–15.7)
HGB BLD-MCNC: 7.8 G/DL (ref 11.7–15.7)
ISSUE DATE AND TIME: NORMAL
MCH RBC QN AUTO: 31.3 PG (ref 26.5–33)
MCH RBC QN AUTO: 32 PG (ref 26.5–33)
MCHC RBC AUTO-ENTMCNC: 33.5 G/DL (ref 31.5–36.5)
MCHC RBC AUTO-ENTMCNC: 34.7 G/DL (ref 31.5–36.5)
MCV RBC AUTO: 92 FL (ref 78–100)
MCV RBC AUTO: 93 FL (ref 78–100)
PLATELET # BLD AUTO: 178 10E3/UL (ref 150–450)
PLATELET # BLD AUTO: 233 10E3/UL (ref 150–450)
POTASSIUM SERPL-SCNC: 3.6 MMOL/L (ref 3.4–5.3)
PROT SERPL-MCNC: 4.9 G/DL (ref 6.4–8.3)
RBC # BLD AUTO: 1.92 10E6/UL (ref 3.8–5.2)
RBC # BLD AUTO: 2.44 10E6/UL (ref 3.8–5.2)
SODIUM SERPL-SCNC: 136 MMOL/L (ref 135–145)
UNIT ABO/RH: NORMAL
UNIT NUMBER: NORMAL
UNIT STATUS: NORMAL
UNIT TYPE ISBT: 600
WBC # BLD AUTO: 10.4 10E3/UL (ref 4–11)
WBC # BLD AUTO: 14.2 10E3/UL (ref 4–11)

## 2025-02-08 PROCEDURE — 85014 HEMATOCRIT: CPT | Performed by: OBSTETRICS & GYNECOLOGY

## 2025-02-08 PROCEDURE — 250N000013 HC RX MED GY IP 250 OP 250 PS 637: Performed by: OBSTETRICS & GYNECOLOGY

## 2025-02-08 PROCEDURE — 36415 COLL VENOUS BLD VENIPUNCTURE: CPT | Performed by: OBSTETRICS & GYNECOLOGY

## 2025-02-08 PROCEDURE — 250N000011 HC RX IP 250 OP 636: Performed by: OBSTETRICS & GYNECOLOGY

## 2025-02-08 PROCEDURE — 120N000001 HC R&B MED SURG/OB

## 2025-02-08 PROCEDURE — 82565 ASSAY OF CREATININE: CPT | Performed by: OBSTETRICS & GYNECOLOGY

## 2025-02-08 PROCEDURE — P9016 RBC LEUKOCYTES REDUCED: HCPCS | Performed by: OBSTETRICS & GYNECOLOGY

## 2025-02-08 RX ORDER — NIFEDIPINE 30 MG
60 TABLET, EXTENDED RELEASE ORAL DAILY
Status: DISCONTINUED | OUTPATIENT
Start: 2025-02-09 | End: 2025-02-11 | Stop reason: HOSPADM

## 2025-02-08 RX ORDER — NIFEDIPINE 30 MG
30 TABLET, EXTENDED RELEASE ORAL ONCE
Status: COMPLETED | OUTPATIENT
Start: 2025-02-08 | End: 2025-02-08

## 2025-02-08 RX ORDER — NIFEDIPINE 30 MG
30 TABLET, EXTENDED RELEASE ORAL DAILY
Status: DISCONTINUED | OUTPATIENT
Start: 2025-02-08 | End: 2025-02-08

## 2025-02-08 RX ORDER — FUROSEMIDE 10 MG/ML
20 INJECTION INTRAMUSCULAR; INTRAVENOUS ONCE
Status: COMPLETED | OUTPATIENT
Start: 2025-02-08 | End: 2025-02-08

## 2025-02-08 RX ADMIN — IBUPROFEN 800 MG: 800 TABLET ORAL at 07:42

## 2025-02-08 RX ADMIN — FUROSEMIDE 20 MG: 10 INJECTION, SOLUTION INTRAMUSCULAR; INTRAVENOUS at 13:50

## 2025-02-08 RX ADMIN — IBUPROFEN 800 MG: 800 TABLET ORAL at 19:55

## 2025-02-08 RX ADMIN — IBUPROFEN 800 MG: 800 TABLET ORAL at 13:52

## 2025-02-08 RX ADMIN — OXYCODONE HYDROCHLORIDE 5 MG: 5 TABLET ORAL at 07:41

## 2025-02-08 RX ADMIN — LEVOTHYROXINE SODIUM 25 MCG: 0.03 TABLET ORAL at 07:42

## 2025-02-08 RX ADMIN — IBUPROFEN 800 MG: 800 TABLET ORAL at 01:33

## 2025-02-08 RX ADMIN — SENNOSIDES AND DOCUSATE SODIUM 2 TABLET: 50; 8.6 TABLET ORAL at 19:55

## 2025-02-08 RX ADMIN — SENNOSIDES AND DOCUSATE SODIUM 2 TABLET: 50; 8.6 TABLET ORAL at 07:42

## 2025-02-08 RX ADMIN — BUPROPION HYDROCHLORIDE 300 MG: 300 TABLET, EXTENDED RELEASE ORAL at 07:41

## 2025-02-08 RX ADMIN — ACETAMINOPHEN 975 MG: 325 TABLET ORAL at 19:55

## 2025-02-08 RX ADMIN — OXYCODONE HYDROCHLORIDE 5 MG: 5 TABLET ORAL at 12:02

## 2025-02-08 RX ADMIN — ACETAMINOPHEN 975 MG: 325 TABLET ORAL at 07:41

## 2025-02-08 RX ADMIN — NIFEDIPINE 30 MG: 30 TABLET, EXTENDED RELEASE ORAL at 20:27

## 2025-02-08 RX ADMIN — ACETAMINOPHEN 975 MG: 325 TABLET ORAL at 13:51

## 2025-02-08 RX ADMIN — ENOXAPARIN SODIUM 40 MG: 40 INJECTION SUBCUTANEOUS at 10:33

## 2025-02-08 RX ADMIN — ENOXAPARIN SODIUM 40 MG: 40 INJECTION SUBCUTANEOUS at 19:56

## 2025-02-08 RX ADMIN — NIFEDIPINE 30 MG: 30 TABLET, EXTENDED RELEASE ORAL at 09:29

## 2025-02-08 RX ADMIN — ACETAMINOPHEN 975 MG: 325 TABLET ORAL at 01:33

## 2025-02-08 RX ADMIN — OXYCODONE HYDROCHLORIDE 5 MG: 5 TABLET ORAL at 15:50

## 2025-02-08 ASSESSMENT — ACTIVITIES OF DAILY LIVING (ADL)
ADLS_ACUITY_SCORE: 29
ADLS_ACUITY_SCORE: 29
ADLS_ACUITY_SCORE: 30
ADLS_ACUITY_SCORE: 30
ADLS_ACUITY_SCORE: 29
ADLS_ACUITY_SCORE: 30
ADLS_ACUITY_SCORE: 30
ADLS_ACUITY_SCORE: 29
ADLS_ACUITY_SCORE: 30
ADLS_ACUITY_SCORE: 29
ADLS_ACUITY_SCORE: 29
ADLS_ACUITY_SCORE: 30
ADLS_ACUITY_SCORE: 30
ADLS_ACUITY_SCORE: 29
ADLS_ACUITY_SCORE: 30
ADLS_ACUITY_SCORE: 29
ADLS_ACUITY_SCORE: 29
ADLS_ACUITY_SCORE: 30

## 2025-02-08 NOTE — PLAN OF CARE
Goal Outcome Evaluation:     Plan of Care Reviewed With: patient    Overall Patient Progress: improvingOverall Patient Progress: improving    Outcome Evaluation: Improving towards discharge    Rating pain 5/10. Managing pain with Tylenol, Ibuprofen and Oxycodone. Declines ice or heat. Using abdominal binder PRN.    Up independently. Has spent majority of day in bed; encouraged OOB and sitting in chair. SCDs and Lovenox.    HTN protocol. Blood pressures have been elevated; Nifedipine restarted. Strict intake and output (has sheet in room to track)    Hgb 6. 1 unit of PRBC. Denies feeling short of breath. C/O fatigue. Hgb recheck this evening around 1800.    Incision ABISAI. No drainage. Tolerating regular diet.    Spouse at the bedside is supportive and completing baby cares.     Pumping today. Has not breastfeed today; lactation following.     Completed paperwork yesterday.    Anticipated discharge home tomorrow.

## 2025-02-08 NOTE — PROVIDER NOTIFICATION
02/08/25 0740   Critical Test Results/Notification   Critical Lab Result (Lab Name and Value) Hgb 6   What Time Did The Lab Notify You? 0740   Provider Notified yes   Date of Provider Notification 02/08/25   Time of Provider Notification 0750   Mechanism of Provider notification verbal (face-to-face)   What Provider Did You Notify? Dr Mayfield   Response other (see comment)  (Will place orders)     1 unit of PRBC and IV Lasix ordered by Dr. Mayfield.

## 2025-02-08 NOTE — LACTATION NOTE
This note was copied from a baby's chart.  Follow up Lactation Visit    Current age : 2 do     Gestational age at delivery: 35w2d     Diaper count (last 24 hours) : 6 wet 5 soiled     Feedings (last 24 hours): 0 breast 9 Human donor breastmilk 15-20 mL    Weight Loss: 5% at 32 hours    Breastfeeding goals: 6-12 months    Past breastfeeding experience:NA     Maternal risk that may affect breastfeeding: Obesity, Pre-eclampsia     Home Pump:      Breast assessment: Breast: asymmetrical Conical and Wide Spaced,  Nipples: Everted and Short and small      Infant oral assessment: Oral: Small mouth and thin cheek pads       Visit Summary: Briefly spoke to mom this morning and made plans to come back for 11:30 am feeding. Mom verbalized that she is not getting anything when pumping and did not pump over night. Reviewed normal milk production and supporting milk supply.         family at bedside- encouraged mom to request LC today- if not will stop by and see her Sunday            Follow up: LC will continue to follow up during hospital stay.

## 2025-02-08 NOTE — PLAN OF CARE
Goal Outcome Evaluation:      Plan of Care Reviewed With: patient    Overall Patient Progress: improvingOverall Patient Progress: improving       Vitals and assessments stable.    No sx of preE.   Rates incisional pain 3-4/10, declines oxy  Up to bathroom x2, voiding well.   Offered to assist with pumping, patient deferred to later, then was sleeping again. Supplementing with 15-20 of donor milk overnight.

## 2025-02-08 NOTE — PROVIDER NOTIFICATION
02/08/25 0800   Provider Notification   Provider Name/Title Dr Mayfield   Method of Notification In Department   Request Evaluate-Remote   Notification Reason Vital Signs Change  (Reviewed elevated blood pressures-request to restart Nifedipine)     Noted elevated blood pressures and requested to restarted Nifedipine that was placed on hold. MD reordered.    /80 with recheck of 145/87. MD updated regarding pressures (Nifedipine given at time of VS check).

## 2025-02-08 NOTE — PROGRESS NOTES
Postpartum Day 2:     Subjective:  The patient feels well. The patient has no emotional concerns. Pain is well controlled with current medications. The patient is ambulating well. She is voiding and passing gas.The amount and color of the lochia is appropriate for the duration of recovery, patient denies clots. The baby is well.    Objective   The patient has had blood pressures that have been on the higher side.  We will initiate antihypertensives today. Urinary output is adequate.       Exam:   BP (!) 145/87 (Patient Position: Sitting, Cuff Size: Adult Regular)   Pulse 79   Temp 98.2  F (36.8  C) (Oral)   Resp 18   Wt 122.2 kg (269 lb 4.8 oz)   LMP 2024 (Exact Date)   SpO2 97%   Breastfeeding Unknown   BMI 43.80 kg/m  .  General: NAD  Abdomen: soft, NT   Incision: C/D/I  Fundus: firm, nontender  Ext: no pain           Pertinent Studies:   All laboratory data reviewed  Serum Glucose range:   Recent Labs   Lab 25  0644 25  0637 25  2340 25  0656   * 113* 119* 124*       CBC:   Recent Labs   Lab 25  0644 25  0637 25  2340   WBC 10.4 14.4* 20.6*   HGB 6.0* 7.6* 8.6*   HCT 17.9* 22.5* 25.3*   MCV 93 93 92    236 274   NEUTROPHIL  --   --  84   LYMPH  --   --  10   MONOCYTE  --   --  5   EOSINOPHIL  --   --  0     Chemistry:   Recent Labs   Lab 25  0644 25  0637 25  2340    133* 132*   POTASSIUM 3.6 4.0 3.7   CHLORIDE 105 103 102   CO2 25 22 19*   BUN 6.3 5.4* 5.7*   CR 0.87 0.74 0.83   GFRESTIMATED 89 >90 >90   SHEELA 7.4* 7.5* 7.7*   PROTTOTAL 4.9* 5.0* 5.3*   ALBUMIN 2.8* 2.9* 3.0*   AST 27 26 28   ALT 38 43 45   ALKPHOS 104 111 104   BILITOTAL <0.2 0.3 0.3     Coags:  Recent Labs   Lab 25  1756   INR 0.97   PTT 24       Impression:   35 yo POD #2 s/p primary C/S for breech    Plan:  ACUTE BLOOD LOSS ANEMIA  - patients vital signs stable  - patient noting symtpoms  - will transfuse 1 unit PRBCs   - component of  drop likely due to dilution with IV fluids being administered  - will recheck Hgb this evening  PREECLAMPSIA WITH SEVERE FEATURES  - s/p magnsium  - elevated blood pressures - will start Nifedipine 30mg XR today.   DISPOSITION   - Continue current care.  - Doing well  - Consider discharge to home tomorrow      Cierra Mayfield, DO on 2/8/2025 at 9:45 AM

## 2025-02-08 NOTE — PLAN OF CARE
Goal Outcome Evaluation: Progressing      Plan of Care Reviewed With: patient    Overall Patient Progress: improving    Patient's VSS. Reflexes diminished but present, no clonus noted. Patient denies any headache, vision disturbances, or epigastric pain. Fundus is firm 1 cm below umbilicus with scant lochia. Patient is up ad ngoc with SBA and performing self-cares with minimal assistance. Reporting incisional pain being treated with acetaminophen, ibuprofen, and abdominal binder. Patient's husabnd is in the room with her and infant, attentive/supportive and participating in cares.    Magnesium infusion and LR turned off after 24 hours postpartum as ordered. Hays removed at 22:00, DTV at 02:00; patient aware, PO fluids encouraged.     Patient completed and turned in PPMA (score 7) and birth certificate this evening.     /73 (Cuff Size: Adult Large)   Pulse 76   Temp 98.8  F (37.1  C) (Oral)   Resp 18   Wt 122.9 kg (270 lb 14.4 oz)   LMP 06/04/2024 (Exact Date)   SpO2 98%   Breastfeeding Unknown   BMI 44.06 kg/m      AMY GAR RN on 2/7/2025 at 10:55 PM    Problem: Adult Inpatient Plan of Care  Goal: Optimal Comfort and Wellbeing  Outcome: Progressing     Problem: Hypertension Acute  Goal: Blood Pressure Within Desired Range  Outcome: Progressing

## 2025-02-09 LAB
ALBUMIN SERPL BCG-MCNC: 3 G/DL (ref 3.5–5.2)
ALP SERPL-CCNC: 111 U/L (ref 40–150)
ALT SERPL W P-5'-P-CCNC: 45 U/L (ref 0–50)
ANION GAP SERPL CALCULATED.3IONS-SCNC: 9 MMOL/L (ref 7–15)
AST SERPL W P-5'-P-CCNC: 33 U/L (ref 0–45)
BILIRUB SERPL-MCNC: 0.2 MG/DL
BUN SERPL-MCNC: 8.5 MG/DL (ref 6–20)
CALCIUM SERPL-MCNC: 8.4 MG/DL (ref 8.8–10.4)
CHLORIDE SERPL-SCNC: 104 MMOL/L (ref 98–107)
CREAT SERPL-MCNC: 0.82 MG/DL (ref 0.51–0.95)
EGFRCR SERPLBLD CKD-EPI 2021: >90 ML/MIN/1.73M2
GLUCOSE SERPL-MCNC: 84 MG/DL (ref 70–99)
HCO3 SERPL-SCNC: 24 MMOL/L (ref 22–29)
HGB BLD-MCNC: 7.2 G/DL (ref 11.7–15.7)
PLATELET # BLD AUTO: 206 10E3/UL (ref 150–450)
POTASSIUM SERPL-SCNC: 3.4 MMOL/L (ref 3.4–5.3)
PROT SERPL-MCNC: 5.4 G/DL (ref 6.4–8.3)
SODIUM SERPL-SCNC: 137 MMOL/L (ref 135–145)

## 2025-02-09 PROCEDURE — 85018 HEMOGLOBIN: CPT | Performed by: OBSTETRICS & GYNECOLOGY

## 2025-02-09 PROCEDURE — 36415 COLL VENOUS BLD VENIPUNCTURE: CPT | Performed by: OBSTETRICS & GYNECOLOGY

## 2025-02-09 PROCEDURE — 250N000013 HC RX MED GY IP 250 OP 250 PS 637: Performed by: OBSTETRICS & GYNECOLOGY

## 2025-02-09 PROCEDURE — 82565 ASSAY OF CREATININE: CPT | Performed by: OBSTETRICS & GYNECOLOGY

## 2025-02-09 PROCEDURE — 120N000001 HC R&B MED SURG/OB

## 2025-02-09 PROCEDURE — 250N000011 HC RX IP 250 OP 636: Performed by: OBSTETRICS & GYNECOLOGY

## 2025-02-09 PROCEDURE — 85049 AUTOMATED PLATELET COUNT: CPT | Performed by: OBSTETRICS & GYNECOLOGY

## 2025-02-09 RX ORDER — NIFEDIPINE 30 MG
30 TABLET, EXTENDED RELEASE ORAL ONCE
Status: COMPLETED | OUTPATIENT
Start: 2025-02-09 | End: 2025-02-09

## 2025-02-09 RX ADMIN — NIFEDIPINE 60 MG: 30 TABLET, EXTENDED RELEASE ORAL at 09:04

## 2025-02-09 RX ADMIN — SIMETHICONE 80 MG: 80 TABLET, CHEWABLE ORAL at 21:29

## 2025-02-09 RX ADMIN — LEVOTHYROXINE SODIUM 25 MCG: 0.03 TABLET ORAL at 09:03

## 2025-02-09 RX ADMIN — IBUPROFEN 800 MG: 800 TABLET ORAL at 02:58

## 2025-02-09 RX ADMIN — NIFEDIPINE 20 MG: 10 CAPSULE ORAL at 22:05

## 2025-02-09 RX ADMIN — NIFEDIPINE 20 MG: 10 CAPSULE ORAL at 22:24

## 2025-02-09 RX ADMIN — SENNOSIDES AND DOCUSATE SODIUM 1 TABLET: 50; 8.6 TABLET ORAL at 21:25

## 2025-02-09 RX ADMIN — SENNOSIDES AND DOCUSATE SODIUM 2 TABLET: 50; 8.6 TABLET ORAL at 09:03

## 2025-02-09 RX ADMIN — NIFEDIPINE 30 MG: 30 TABLET, EXTENDED RELEASE ORAL at 21:50

## 2025-02-09 RX ADMIN — OXYCODONE HYDROCHLORIDE 5 MG: 5 TABLET ORAL at 15:18

## 2025-02-09 RX ADMIN — ACETAMINOPHEN 975 MG: 325 TABLET ORAL at 02:57

## 2025-02-09 RX ADMIN — ANTACID TABLETS 500 MG: 500 TABLET, CHEWABLE ORAL at 00:22

## 2025-02-09 RX ADMIN — BUPROPION HYDROCHLORIDE 300 MG: 300 TABLET, EXTENDED RELEASE ORAL at 09:03

## 2025-02-09 RX ADMIN — IBUPROFEN 800 MG: 800 TABLET ORAL at 09:03

## 2025-02-09 RX ADMIN — NIFEDIPINE 10 MG: 10 CAPSULE ORAL at 21:45

## 2025-02-09 RX ADMIN — IBUPROFEN 800 MG: 800 TABLET ORAL at 21:26

## 2025-02-09 RX ADMIN — ACETAMINOPHEN 975 MG: 325 TABLET ORAL at 21:25

## 2025-02-09 RX ADMIN — OXYCODONE HYDROCHLORIDE 5 MG: 5 TABLET ORAL at 09:07

## 2025-02-09 RX ADMIN — OXYCODONE HYDROCHLORIDE 5 MG: 5 TABLET ORAL at 00:22

## 2025-02-09 RX ADMIN — IBUPROFEN 800 MG: 800 TABLET ORAL at 15:19

## 2025-02-09 RX ADMIN — ACETAMINOPHEN 975 MG: 325 TABLET ORAL at 15:19

## 2025-02-09 RX ADMIN — ACETAMINOPHEN 975 MG: 325 TABLET ORAL at 09:04

## 2025-02-09 ASSESSMENT — ACTIVITIES OF DAILY LIVING (ADL)
ADLS_ACUITY_SCORE: 29

## 2025-02-09 NOTE — LACTATION NOTE
Follow up Lactation Visit    Current age : 2 day 11  hours old    Gestational age at delivery: 35w2d     Diaper count (last 24 hours) : 7 wet 5 soiled      Feedings (last 24 hours): 0 breast 8 Other formula types 22kcal  10-32ml mL    Weight Loss: 7.5% at 3rd weight check -5% at 34 hours and -4.3% at 24 hours     Breastfeeding goals: 6-12 months    Past breastfeeding experience:NA    Maternal health risk that may affect breastfeeding:  Home Pump: would like to rent symphony at discharge     Breast assessment:  Breast: asymmetrical Conical and Wide Spaced, Nipples: Everted and Short and small left nipple has small scab.    Infant oral assessment: Oral: Small mouth and thin cheek pads     Feeding assessment: Assisted with bottle feeding x2 today.  Encouraged FOB not to pull bottle out of infant mouth to pace infant instead tilt bottle down when infant needs external pacing.  FOB reported that bottle feeding went smoother with this tips and bottle feeding seemed more efficient.      Due to weight lose and infants GA, encouraged FOB to offer infant at least 30 ml, keeping her arms un swaddled and if needed unzip her feet, slowly pushing infant to 45ml over the next 4-5 feedings.          Visit Summary:   Assisted in pumping with mom,  do not think the sore on her left  nipple was caused by the flange being to small, mom may benefit from a smaller flange.     Small drops of colostrum on nipple surface.   Encouraged mom to feed those drops to infant.      Feeding plan:   Due to weight lose and infants GA, encouraged FOB to offer infant at least 30 ml, keeping her arms un swaddled and if needed unzip her feet, slowly pushing infant to 45ml over the next 4-5 feedings.        Pump for any supplement.      Education:   []  Feeding Patterns in the First Few Days/second Night     [] Maternal Risk Factors that Could Affect Milk Supply      [] Hand Expression   [x] Stages of Milk Production           [] Feeding  Cues           [] LPI Feeding Behaviors  [] LBW Feeding Behaviors    [] Rousing Techniques          [] Benefits of Skin to Skin               [] Breastfeeding Positions          [] Tips to Maintain a Deep Latch               [] Nutritive vs Non-Nutritive Sucking           [] Gentle Breast Compressions  [] [place] Weight Loss   [] How to Know When Baby is Getting Enough to Eat  [x] Supplementation Methods        [x] Type of Supplement   [] Nipple Shield  [x] Sore Nipples- small scab on left nipple        [] Pacifier Use  [] Tight Frenulum           [] Breastfeeding Twins  [x] Pumping Plan  [x] Breastpump Education   [] Engorgement/Reverse Pressure Softening           [x] Inpatient Lactation Support      [] Outpatient Lactation Resources/Follow up    Follow up: LC will continue to follow up during hospital stay.

## 2025-02-09 NOTE — PROVIDER NOTIFICATION
02/09/25 0910 02/09/25 0925 02/09/25 0940   Vital Signs   BP (!) 163/89 (!) 146/82 139/86   BP Location Right arm Left arm Left arm   Patient Position Semi-Lazaro's Semi-Lazaro's Semi-Lazaro's   Cuff Size Adult Regular Adult Regular Adult Regular      02/09/25 0955   Vital Signs   BP (!) 149/76   BP Location Left arm   Patient Position Semi-Lazaro's   Cuff Size Adult Regular     Dr. Parham updated regarding severe blood pressure reading. Scheduled Nifedipine.

## 2025-02-09 NOTE — PLAN OF CARE
Goal Outcome Evaluation:      Plan of Care Reviewed With: parent    Overall Patient Progress: improvingOverall Patient Progress: improving    Outcome Evaluation: Improving towards discharge    Managing pain with Tylenol, Ibuprofen and Oxycodone. Declines ice or heat. Using abdominal binder.     Up independently. Has been sitting in the chair and OOB much of the day. Declined Lovenox.     HTN protocol. Blood pressures have been elevated. Scheduled Nifedipine. Strict intake and output (has sheet in room to track)     Incision ABISAI. No drainage. Tolerating regular diet.     Spouse at the bedside is supportive and completing baby cares.      Pumping. Has not breastfeed today; lactation following.      Anticipated discharge home tomorrow pending blood pressures.

## 2025-02-09 NOTE — PROGRESS NOTES
PROGRESS NOTE    Called with continued elevation in blood pressure. Blood pressures have been consistently 140s/80s.  I will give another dose of Nifedipine 30mg XR now and increase the AM dose to 60mg.                   02/08/25 1641 98.1  F (36.7  C) 81 -- -- 139/89     -- --    02/08/25 1351 97.9  F (36.6  C) -- -- 81 bpm 140/82 Abnormal      -- --    02/08/25 1300 98.9  F (37.2  C) -- -- 79 bpm 141/84 Abnormal      -- --    02/08/25 1200 98.9  F (37.2  C) -- -- 74 bpm 131/81     -- --    02/08/25 1100 98.7  F (37.1  C) -- -- 76 bpm 148/84 Abnormal      -- --    02/08/25 1040 98.2  F (36.8  C) 72 -- -- 133/82     -- --    02/08/25 0934 -- -- -- -- 145/87 Abnormal      -- --    02/08/25 0929 -- -- -- 75 bpm 151/80 Abnormal               Cierra Mayfield, DO

## 2025-02-09 NOTE — PROGRESS NOTES
[facility]  Postpartum Day 3:     Subjective:  The patient feels well.  Pain is well controlled with current medications. The patient is ambulating Often.  She is voiding and is passing flatus.The amount and color of the lochia is appropriate for the duration of recovery, patient denies clots. The baby is doing well and is bottle feeding.     Pressures 169/89 at 0910.  Got nifedipine around 9am recheck was normal range  Blood pressure at 0300 was 148/80    Exam:   BP (!) (P) 146/82 (BP Location: Left arm, Patient Position: Semi-Lazaro's, Cuff Size: Adult Regular)   Pulse 103   Temp 98  F (36.7  C) (Oral)   Resp 18   Wt 121.1 kg (266 lb 14.4 oz)   LMP 2024 (Exact Date)   SpO2 99%   Breastfeeding Unknown   BMI 43.41 kg/m    General: NAD, alert and orientated   Abdomen: soft, NT   Fundus: firm, nontender  Incision: covered, C/D/I  Ext: no pain     Lab Results   Component Value Date    HGB 7.2 (L) 2025     A NEG    Impression:   Normal postpartum course, POD#3 LTCS      Plan:   Advised to stay another day because of her fluctuation of blood pressure  Currently on 60mg XR nifedipine this am, she got 30mg XR nifedipine last night  Acute blood loss anemia, s/p 1 unit blood transfusion, hg today 7.2, will need to go home on iron      Dia Parham MD 2025 9:35 AM

## 2025-02-09 NOTE — PLAN OF CARE
Goal Outcome Evaluation:      Plan of Care Reviewed With: patient, spouse    Overall Patient Progress: improvingOverall Patient Progress: improving    Outcome Evaluation: patient was excited to ambulate the halls this shift, she was nervous about having her first bowel movement since before the birth of her child. she walked the unit multiple times. she did end up having a bowel movement and reports that she is relieved and that it was not difficult. blood pressures remain elevated - she has scheduled nifedipine in the am (this will be a new dose of 60mg instead of 30). daily weight complete. Labs are scheduled for 0600 today.    Updated Dr Mayfield near start of shift after assessing patient - she was continuing to have elevated pressures that were starting to slowly increase and also she had accidentally pulled out her IV while taking a shower so she had loss if IV access. She gave orders for a one time PM dose of nifedipine and increased her AM dose as well. She was updated on her overall status, HGB results from am and how she is doing, her pain levels etc. She has spent a lot of time speaking with this RN about infant cares, bowel health, and infant feeding. She has held infant skin to skin while awake tonight and reported that she was feeling guilty about not being able to hold her in PACU and while she was on magnesium infusion therapy. We spoke about this and her strengths emphasized. She pumped tonight and was doing hand expression intermittently. She is excited that she is starting to see drops of colostrum from her right breast.

## 2025-02-10 LAB
ALBUMIN SERPL BCG-MCNC: 3.3 G/DL (ref 3.5–5.2)
ALP SERPL-CCNC: 110 U/L (ref 40–150)
ALT SERPL W P-5'-P-CCNC: 72 U/L (ref 0–50)
ANION GAP SERPL CALCULATED.3IONS-SCNC: 12 MMOL/L (ref 7–15)
AST SERPL W P-5'-P-CCNC: 52 U/L (ref 0–45)
BILIRUB SERPL-MCNC: 0.3 MG/DL
BUN SERPL-MCNC: 9.4 MG/DL (ref 6–20)
CALCIUM SERPL-MCNC: 8.4 MG/DL (ref 8.8–10.4)
CHLORIDE SERPL-SCNC: 102 MMOL/L (ref 98–107)
CREAT SERPL-MCNC: 0.76 MG/DL (ref 0.51–0.95)
EGFRCR SERPLBLD CKD-EPI 2021: >90 ML/MIN/1.73M2
GLUCOSE SERPL-MCNC: 92 MG/DL (ref 70–99)
HCO3 SERPL-SCNC: 23 MMOL/L (ref 22–29)
HGB BLD-MCNC: 7.3 G/DL (ref 11.7–15.7)
HOLD SPECIMEN: NORMAL
PATH REPORT.COMMENTS IMP SPEC: NORMAL
PATH REPORT.COMMENTS IMP SPEC: NORMAL
PATH REPORT.FINAL DX SPEC: NORMAL
PATH REPORT.GROSS SPEC: NORMAL
PATH REPORT.MICROSCOPIC SPEC OTHER STN: NORMAL
PATH REPORT.RELEVANT HX SPEC: NORMAL
PHOTO IMAGE: NORMAL
PLATELET # BLD AUTO: 239 10E3/UL (ref 150–450)
POTASSIUM SERPL-SCNC: 3 MMOL/L (ref 3.4–5.3)
POTASSIUM SERPL-SCNC: 3.7 MMOL/L (ref 3.4–5.3)
PROT SERPL-MCNC: 5.5 G/DL (ref 6.4–8.3)
SODIUM SERPL-SCNC: 137 MMOL/L (ref 135–145)

## 2025-02-10 PROCEDURE — 87340 HEPATITIS B SURFACE AG IA: CPT | Performed by: OBSTETRICS & GYNECOLOGY

## 2025-02-10 PROCEDURE — 250N000013 HC RX MED GY IP 250 OP 250 PS 637: Performed by: OBSTETRICS & GYNECOLOGY

## 2025-02-10 PROCEDURE — 88307 TISSUE EXAM BY PATHOLOGIST: CPT | Mod: 26 | Performed by: PATHOLOGY

## 2025-02-10 PROCEDURE — 86704 HEP B CORE ANTIBODY TOTAL: CPT | Performed by: OBSTETRICS & GYNECOLOGY

## 2025-02-10 PROCEDURE — 36415 COLL VENOUS BLD VENIPUNCTURE: CPT | Performed by: OBSTETRICS & GYNECOLOGY

## 2025-02-10 PROCEDURE — 120N000001 HC R&B MED SURG/OB

## 2025-02-10 PROCEDURE — 250N000011 HC RX IP 250 OP 636: Performed by: OBSTETRICS & GYNECOLOGY

## 2025-02-10 PROCEDURE — 82565 ASSAY OF CREATININE: CPT | Performed by: OBSTETRICS & GYNECOLOGY

## 2025-02-10 PROCEDURE — 85049 AUTOMATED PLATELET COUNT: CPT | Performed by: OBSTETRICS & GYNECOLOGY

## 2025-02-10 PROCEDURE — 85018 HEMOGLOBIN: CPT | Performed by: OBSTETRICS & GYNECOLOGY

## 2025-02-10 PROCEDURE — 84132 ASSAY OF SERUM POTASSIUM: CPT | Performed by: OBSTETRICS & GYNECOLOGY

## 2025-02-10 RX ORDER — NIFEDIPINE 30 MG
30 TABLET, EXTENDED RELEASE ORAL EVERY EVENING
Status: DISCONTINUED | OUTPATIENT
Start: 2025-02-10 | End: 2025-02-11 | Stop reason: HOSPADM

## 2025-02-10 RX ORDER — LABETALOL 200 MG/1
200 TABLET, FILM COATED ORAL EVERY 8 HOURS SCHEDULED
Status: DISCONTINUED | OUTPATIENT
Start: 2025-02-10 | End: 2025-02-11 | Stop reason: HOSPADM

## 2025-02-10 RX ORDER — POTASSIUM CHLORIDE 1500 MG/1
20 TABLET, EXTENDED RELEASE ORAL ONCE
Status: COMPLETED | OUTPATIENT
Start: 2025-02-10 | End: 2025-02-10

## 2025-02-10 RX ORDER — POTASSIUM CHLORIDE 1500 MG/1
40 TABLET, EXTENDED RELEASE ORAL ONCE
Status: COMPLETED | OUTPATIENT
Start: 2025-02-10 | End: 2025-02-10

## 2025-02-10 RX ADMIN — POTASSIUM CHLORIDE 40 MEQ: 1500 TABLET, EXTENDED RELEASE ORAL at 11:12

## 2025-02-10 RX ADMIN — ACETAMINOPHEN 975 MG: 325 TABLET ORAL at 03:10

## 2025-02-10 RX ADMIN — BUPROPION HYDROCHLORIDE 300 MG: 300 TABLET, EXTENDED RELEASE ORAL at 08:04

## 2025-02-10 RX ADMIN — IBUPROFEN 800 MG: 800 TABLET ORAL at 23:35

## 2025-02-10 RX ADMIN — POTASSIUM CHLORIDE 20 MEQ: 1500 TABLET, EXTENDED RELEASE ORAL at 13:15

## 2025-02-10 RX ADMIN — IBUPROFEN 800 MG: 800 TABLET ORAL at 03:10

## 2025-02-10 RX ADMIN — NIFEDIPINE 60 MG: 30 TABLET, EXTENDED RELEASE ORAL at 09:25

## 2025-02-10 RX ADMIN — OXYCODONE HYDROCHLORIDE 5 MG: 5 TABLET ORAL at 13:21

## 2025-02-10 RX ADMIN — IBUPROFEN 800 MG: 800 TABLET ORAL at 17:25

## 2025-02-10 RX ADMIN — ENOXAPARIN SODIUM 40 MG: 40 INJECTION SUBCUTANEOUS at 20:35

## 2025-02-10 RX ADMIN — SENNOSIDES AND DOCUSATE SODIUM 2 TABLET: 50; 8.6 TABLET ORAL at 09:25

## 2025-02-10 RX ADMIN — NIFEDIPINE 30 MG: 30 TABLET, EXTENDED RELEASE ORAL at 20:35

## 2025-02-10 RX ADMIN — LABETALOL HYDROCHLORIDE 200 MG: 200 TABLET ORAL at 22:20

## 2025-02-10 RX ADMIN — SENNOSIDES AND DOCUSATE SODIUM 2 TABLET: 50; 8.6 TABLET ORAL at 20:35

## 2025-02-10 RX ADMIN — IBUPROFEN 800 MG: 800 TABLET ORAL at 09:24

## 2025-02-10 RX ADMIN — LEVOTHYROXINE SODIUM 25 MCG: 0.03 TABLET ORAL at 08:04

## 2025-02-10 ASSESSMENT — ACTIVITIES OF DAILY LIVING (ADL)
ADLS_ACUITY_SCORE: 27
ADLS_ACUITY_SCORE: 24
ADLS_ACUITY_SCORE: 27
ADLS_ACUITY_SCORE: 24
ADLS_ACUITY_SCORE: 27
ADLS_ACUITY_SCORE: 24
ADLS_ACUITY_SCORE: 27
ADLS_ACUITY_SCORE: 24
ADLS_ACUITY_SCORE: 27
ADLS_ACUITY_SCORE: 24

## 2025-02-10 NOTE — PLAN OF CARE
Goal Outcome Evaluation:      Plan of Care Reviewed With: patient, spouse    Overall Patient Progress: improving  Outcome Evaluation: Patient vital signs are normal. Blood pressure at around noon today 143/78. Patient denies headache, visual disturbances, epigastric pain and edema is minimal to none.  Patient is ambulating independently. Potassium replaced today per standard protocol.   Patient appears to be in good mood today, observed laughing and smiling throughout the day. Patient attentive to infant and participating with infant cares.   Patient seen by lactation consultant today, has hospital grade breast pump rental for home.   Social work also visited with Sourav today, see note from social work for more details.     Patient desires to go home this evening.     Plan for potassium lab value to be drawn at 1715. Dr. Sparrow to review blood pressures later this evening and placed discharge orders if patient qualifies.     Keri Stephens RN on 2/10/2025 at 2:20 PM

## 2025-02-10 NOTE — LACTATION NOTE
This note was copied from a baby's chart.  Follow up Lactation Visit    Current age :  4 days old    Gestational age at delivery:  35w2d     Diaper count (last 24 hours):  6 wet 2 soiled Green/Brown     Feedings (last 24 hours):  0 breast and 11 Other formula types Neosure 22cal 10-25mL    Weight Loss:  7.11% 4th weight check up 10g    Home Pump:  Symphony, Used Spectra s1    Breast assessment:  Breast: Soft, asymmetrical and conincal/wide, Nipple:Everted and Intact    Infant oral assessment:  NA    Feeding assessment:  LC saw the end of a bottle feeding. Infant slightly upright and leaning back, bottle parallel with floor and using pacing. Infant was unswaddled with no tone after taking 20ml. Parents report she fatigues quickly with feedings and takes at least 30 minutes to take 10-25ml. One feeding she took 38ml. They wake or infant wakes at 2-2.5 hours. Plan to space out feedings, try side lying position and OT to come to next feeding.     Visit Summary:  Sourav report pumping is improved and comfortable. She pumped 5ml this last pump which is most she's pumped. Discussed transition to maintain mode by day 6.      Lactation rented parent a Hospital Grade breast pump from [facility].  Mother was instructed on the use and cleaning of the breast pump.  Mother verbalizes understanding of how to use the breast pump.  She was instructed to empty her breasts 8-12 times in 24 hours, either with the baby at the breast or the breast pump, to have optimal milk production for her .        Feeding plan:  Wake infant by 2hr 45min - 3 hours for 8 feedings in 24 hours to see if this will help increase energy with feedings. Aim for 45ml today and work towards 45-60ml/feeding in the next day. Pump with infant feedings. Take a 4-5 hours sleep stretch overnight. Skin to skin and put infant to breast for 5-10 minutes when she is taking bottles in 30 minutes. Keep entire feeding to 30 minutes.     Education:    [x] [place] Feeding Patterns in the First Few Days/second Night     [] Maternal Risk Factors that Could Affect Milk Supply      [] Hand Expression   [x] Stages of Milk Production           [] Feeding Cues           [x] LPI Feeding Behaviors  [] LBW Feeding Behaviors    [x] Rousing Techniques          [x] Benefits of Skin to Skin               [] Breastfeeding Positions          [] Tips to Maintain a Deep Latch               [] Nutritive vs Non-Nutritive Sucking           [] Gentle Breast Compressions  [] [place] Weight Loss   [] How to Know When Baby is Getting Enough to Eat  [x] Supplementation Methods        [x] Type of Supplement   [] Nipple Shield  [] Sore Nipples        [] Pacifier Use  [] Tight Frenulum           [] Breastfeeding Twins  [x] Pumping Plan  [x] Breastpump Education   [x] Engorgement/Reverse Pressure Softening           [x] Inpatient Lactation Support      [x] Outpatient Lactation Resources/Follow up           Follow up: LC will continue to follow up during hospital stay.  Encouraged OP lactation appointment for Friday or Early next week.

## 2025-02-10 NOTE — DISCHARGE INSTRUCTIONS
*You have a Home Care nurse visit planned for Wednesday, 2/12/25. The nurse will contact you after discharge to confirm the appointment time. If you do not hear from the nurse by Wednesday morning, please call [phone].   (Please do not schedule a clinic appointment on the same day as home nurse visit.)

## 2025-02-10 NOTE — PLAN OF CARE
"   Goal Outcome Evaluation:      Plan of Care Reviewed With: patient, spouse, other (see comments) (provider)    Overall Patient Progress: improvingOverall Patient Progress: improving    Outcome Evaluation: Sourav has received another one time dosing of PO nifedipine and 3 PRN PO Nifedipine doses via the rescue pathway for severe blood pressures this evening. She had an IV placed during severe pressures as the next medication in algorithym pathway was via IV. she has almost reached her max dosing for nifedipine for 24 hour span. provider Dione had noted over the phone that they will be reviewing AM nifedipine dosing but nursing will continue to monitor total MGs appropriately as time goes on. at this time she is attempting to sleep and stay calm. she is on hourly BP assessments.            Problem: Adult Inpatient Plan of Care    Flowsheets (Taken 2/10/2025 0201)  Individualized Care Needs:     during postpartum care Sourav has been expressing feelings of guilt ( and has stated many times she feels guilty) about her ability/ skills at motherhood. ( for example she brings up often how she \"cant\" feed her via breast or bottle as she isnt good enough at it, she is \"afraid to do something wrong\" even though when asked how she would compelte said tasks she verbally explains it correct or she is displaying the task correctly and she is nervous) This RN has been assigned this patient 2 different times in her postpartum period and this has been consistant. she has had her strenths emphasised consistantly through her stay here and education and support as well. she is very knowlegeable when explaining things and about taking care of her infant and is very caring and loving but she has second guessed herself many times ( when doing things perfectly) and from some nursing staffs' interactions it has been noted she is almost avoiding certain cares as she \"does not want to mess it up\" or \"he is better than me so he can do it\" " "etc.. nursing has had some conversations about this and encouraging her to do them and helping her along the way. Her  is very supportive and has mentioned the same that she is doing great and it will all be ok etc. She denies feelings of depression and reports she \"will be ok\". printouts have been given to her and resources made available as well. nursing will continue to support her and her infant.  Anxieties, Fears or Concerns: anxiety, guilt , reports that she is nervous to miss something or do something wrong as she is a first time mother.  Patient/Family-Specific Goals (Include Timeframe): support her, encourage her, emphasise thrength, education.    Addendum below:   Since 2 am she has been awake for \"her shift to watch and care for baby\" per her and her husbands plan so they can take turns sleeping. Infant has been sleeping almost the entire time in [facility] and when offered for her lights to be off so she can sleep while Doris sleeps she says no she needs to be awake to care for her. She says she's just so cute its nice to watch her too.     When asking when the next schedule feed was for her infant so Rn can help rouse infant and weigh her,  Sourav reports 4 am, and she will wake her  to feed her. I had asked her kindly, why they made shifts for her to be awake if she was not going to feed infant and she said that she \"just likes him to do it because he's been the one to feed her.\" I offered to be in the room and help her the entire time as we had planned earlier when she was learning to hold her baby to burp her and after some consideration she agreed to be the one to feed infant so her  can sleep and she can have nursing help if need be before she discharges to home. She appears happy and excited  to be able to feed this next session    Copied from her child's chart      02/10/25 3322   Feeding Information   Feeding Method   (pt mother reports the next scheduled feed is 4 if she " does not cue before then, she said she will wake up FOB to feed her, i encouraged her to try the bottle and she now agrees she will try to feed her with this RNs help)     Feeding update:   Sourav offered to start the feeding process by waking her baby by changing her diaper and tickling her feet ( this initially was to be RN's part of the process as she was preparing for the feed but She was excited to change Doris's diaper as it was the first diaper ever that she would be changing for her baby. She did not need any assistance as she has helped change other peoples children before. She also mentioned that she did not have any clothes at the hospital yet that fit her as she only brought  sizes with, Nursing borrowed an outfit from NICU for her to dress her up in and this was another  fun first time goal met between her and her daughter.      Sourav then prepared the formula and nipple perfectly and fed her infant formula via bottle (displayed understanding verbally and via demonstration) with minimal assistance and appropriate questions. She had some assistance with burping infant and is now holding and talking with her and beaming with excitement. She is excited to surprise her  with fun news.     Blood pressure update - she did have another severe range blood pressure that returned to her baseline within the 15 minutes before treatment - Q15 minute checks back in place. Provider has now modified her nifedipine to be BID

## 2025-02-10 NOTE — PROGRESS NOTES
Sepideh [place]    Blood pressure continues to be labile.   Will add labetalol 200 mg TID.    Also concern from RN that HBsAb (antibody) was positive. HBsAg (antigen) was not drawn. She is unsure if she has been vaccinated for Hepatitis B.  HBsAg and anti-Hbc added on for clarity.    She has had elevated LFTs intermittently since 7/2024.   RUQ US 7/2024 showed:  FINDINGS: Liver echotexture borderline hyperechoic with no focal mass  or intrahepatic duct dilation. 117.8 cm. Main portal vein color-flow  normal with normal caliber 12 mm.  Gallbladder normal.  Right kidney normal measuring 10.4 cm in length.  Gallbladder wall thickness is normal at under 3 mm and common bile  duct caliber was normal at 3.5 mm.  The included pancreas appearing unremarkable although the tail was not  included due to bowel gas blocking.  No ascites.  IMPRESSION: Borderline hepatic steatosis by ultrasound.    Will await hepatitis labs  Repeat CMP tomorrow  Potential discharge tomorrow pending blood pressures.    Zaina Sparrow MD 2/10/2025 5:15 PM

## 2025-02-10 NOTE — PROVIDER NOTIFICATION
Notified Dr. Parham of Sourav's 2 severe range blood pressures, she ordered one time dose of nifedipine XR 30 mg and to utilize po nifedipine 10mg rescue pathway.   Dr. Parham states she will modify her daily doses for AM.

## 2025-02-10 NOTE — PROGRESS NOTES
[facility] RN Care Coordinator Note    Sourav Tomcamilla  [id number]  1990    Chart reviewed, discharge plan discussed with bedside RN, needs assessed. If stable, discharge planned later this evening (vs tomorrow). Patient requests home care visit, nurse visit planned Wednesday, 2/12/25, as ordered for baby. [facility] Intake contacted by this writer; patient added to [facility] schedule. Follow-up post-delivery appointment to be scheduled by patient as instructed once discharge is confirmed.     RN Care Coordinator will continue to follow and assist as needed with discharge plan.

## 2025-02-10 NOTE — PROGRESS NOTES
Obstetrics Post-Op Progress Note         Assessment and Plan:    Assessment: Sourav Mascorro is a 34 year old  Post-operative day #4 s/p Low transverse primary  section doing well.     L&D complications: Breech  Acute blood loss anemia s/p 1 unit PRBCs  - Hgb stable   Pre-eclampsia with severe features   - S/p Mag Sulfate  - on Nifedipine 60 XL am and 30Xl pm   Cutaneous lupus  Obesity  Hepatic steatosis   Hypothyroidism  Asthma           Plan:   Ambulation encouraged  Pain control measures as needed  Discontinue tylenol with elevated LFTs   Potassium supplementation.   Will watch blood pressures throughout the day and possible discharge home later today vs tomorrow.            Interval History:   Planning to rent a hospital grade breast pump. Has a blood pressure cuff at home.  Has underlying hepatic disease.  LFTs slightly elevated again today.  Edema is much improved. Takes oxycodone in the day when she is more active.  She wants to decline the lovenox today as she is very active.      Severe range blood pressures about 2100 last night and treated.  Single elevated systolic at 0400 (162/88).          Physical Exam:   Temp: 99  F (37.2  C) Temp src: Oral BP: 136/85 (duplicate from machine) Pulse: 91   Resp: 21 SpO2: 99 % O2 Device: None (Room air)    Vitals:    25 0429 25 0409 02/10/25 0400   Weight: 122.2 kg (269 lb 4.8 oz) 121.1 kg (266 lb 14.4 oz) 119 kg (262 lb 6.4 oz)     Vital Signs with Ranges  Temp:  [98  F (36.7  C)-99  F (37.2  C)] 99  F (37.2  C)  Pulse:  [91] 91  Resp:  [16-21] 21  BP: (132-185)/(72-94) 136/85  SpO2:  [96 %-100 %] 99 %  I/O last 3 completed shifts:  In: 2867 [P.O.:2867]  Out: 4300 [Urine:4300]    Uterine fundus is firm, non-tender and at the level of the umbilicus  Incision C/D/I with steri-trips           Data:     Recent Results (from the past 24 hours)   Hemoglobin    Collection Time: 02/10/25  6:44 AM   Result Value Ref Range    Hemoglobin 7.3 (L)  11.7 - 15.7 g/dL   Platelet count    Collection Time: 02/10/25  6:44 AM   Result Value Ref Range    Platelet Count 239 150 - 450 10e3/uL   Comprehensive Metabolic Panel    Collection Time: 02/10/25  6:44 AM   Result Value Ref Range    Sodium 137 135 - 145 mmol/L    Potassium 3.0 (L) 3.4 - 5.3 mmol/L    Carbon Dioxide (CO2) 23 22 - 29 mmol/L    Anion Gap 12 7 - 15 mmol/L    Urea Nitrogen 9.4 6.0 - 20.0 mg/dL    Creatinine 0.76 0.51 - 0.95 mg/dL    GFR Estimate >90 >60 mL/min/1.73m2    Calcium 8.4 (L) 8.8 - 10.4 mg/dL    Chloride 102 98 - 107 mmol/L    Glucose 92 70 - 99 mg/dL    Alkaline Phosphatase 110 40 - 150 U/L    AST 52 (H) 0 - 45 U/L    ALT 72 (H) 0 - 50 U/L    Protein Total 5.5 (L) 6.4 - 8.3 g/dL    Albumin 3.3 (L) 3.5 - 5.2 g/dL    Bilirubin Total 0.3 <=1.2 mg/dL     Recent Labs   Lab Test 12/18/24  0958   AS Negative     Recent Labs   Lab Test 02/10/25  0644 02/09/25  0623   HGB 7.3* 7.2*     Recent Labs   Lab Test 07/15/24  1603   RUQIGG Positive       Zaina Sparrow MD

## 2025-02-10 NOTE — PLAN OF CARE
Problem: Hypertension Acute  Goal: Blood Pressure Within Desired Range  Outcome: Progressing   Goal Outcome Evaluation:    Problem: Hypertension Acute  Goal: Blood Pressure Within Desired Range  Outcome: Progressing       Olivia Peters, RN   Registered Nurse  Nursing     Provider Notification  Signed     Date of Service: 2/9/2025  9:40 PM  Creation Time: 2/9/2025 10:04 PM       Notified Dr. Parham of Sourav's 2 severe range blood pressures, she ordered one time dose of nifedipine XR 30 mg and to utilize po nifedipine 10mg rescue pathway.   Dr. Parham states she will modify her daily doses for AM.          Sourav is having severe range blood pressures, she is being treated by nifedipine acute HTN pathway per provider request - she denies upper quadrant pain and other HTN related assessments but she does have gas pain inb lower left and right quadrants

## 2025-02-10 NOTE — PROVIDER NOTIFICATION
Patient declined Lovenox this morning. Patient has been provided education on reason to take medication and her specific risk factors related to blood clots. Patient states she will continue to ambulate frequently. Dr. Sparrow has been updated and discussed medication reason and risk factors with patient as well.    Patients ALT and AST elevated this morning. Patient has underlying liver disease. Dr. Sparrow is aware of lab values. Order to discontinue Tylenol.     Plan is to continue to monitor patients blood pressures today. Possible discharge this evening per Dr. Sparrow.     Keri Stephens RN on 2/10/2025 at 8:29 AM

## 2025-02-10 NOTE — CONSULTS
INITIAL SOCIAL WORK MATERNITY ASSESSMENT     DATA:      Reason for Social Work Consult: elevated readmission risk score     Presenting Information: SW met with pt in her postpartum room. Pts  baby asleep in the bassinet. Pts , David, present.     Living Situation: Pt reports living with David. This is their first baby.      Social Support/Professional Community Support:  Pt reports having strong social support from family including her parents and sister. Pt also reports that they attended some classes at [employer] during pregnancy and that she is signed up for a new mom's class and David is signed up for a new dad's class in the coming weeks so they are hoping to get support and build connection in these classes as well.    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 David reports that he is not working currently. Pt reports plan for David to be a stay at home dad. Pt denies having county benefits or any financial concerns at baseline.     Baby Supplies: Pt reports having needed baby supplies.     Mental Health History: Pt confirms a long history of depression for which she takes medication. She reports no acute concerns currently and reports that she typically updates her provider if she notices she is feeling worse to discuss how to address it. She reports having utilized therapy in the past, but not currently.      History of Postpartum Mood Disorders: NA        INTERVENTION:      SW completed chart review and collaborated with the multidisciplinary team.   Psychosocial Assessment   Introduction to Maternal Child Health  role and scope of practice   Reviewed Hospital and Community Resources   Assessed Mental Health History and Current Symptoms   Identified stressors, barriers and family concerns   Provided support and active empathetic listening and validation.    Provided psychoeducation on  mood and anxiety disorders, assessed for any current symptoms or history    ASSESSMENT:    Pt receptive to meeting with SW today. She acknowledges feeling okay and being hopeful that she may be able to discharge later today. Pt reports that her entire delivery and pts early days have not been what she anticipated so she is dealing with the emotional side of adjusting to that loss. SW validated this and provided support. SW encouraged pt and David to utilize their support system as they adjust to parenthood and pt continues to recover once home.     Discussed postpartum mood concerns. SW provided education on when to seek help including when to seek emergency help. Pt had noted that in addition to updating her provider that David is also aware of her mental health and assists in monitoring. SW praised pts planning related to her mental health including close communication with her provider and having family assist in monitoring. Discussed the importance of both of these in the postpartum period. Pt reports understanding.       PLAN:    SW provided parent resources for pt and David to utilize as needed. No further SW needs identified at this time.    ISMA Araujo

## 2025-02-11 VITALS
HEART RATE: 78 BPM | TEMPERATURE: 98.5 F | SYSTOLIC BLOOD PRESSURE: 132 MMHG | OXYGEN SATURATION: 99 % | DIASTOLIC BLOOD PRESSURE: 74 MMHG | BODY MASS INDEX: 43.28 KG/M2 | RESPIRATION RATE: 18 BRPM | WEIGHT: 266.1 LBS

## 2025-02-11 PROBLEM — O14.10 PREECLAMPSIA, SEVERE: Status: ACTIVE | Noted: 2025-02-11

## 2025-02-11 LAB
ALBUMIN SERPL BCG-MCNC: 3.2 G/DL (ref 3.5–5.2)
ALP SERPL-CCNC: 108 U/L (ref 40–150)
ALT SERPL W P-5'-P-CCNC: 66 U/L (ref 0–50)
ANION GAP SERPL CALCULATED.3IONS-SCNC: 11 MMOL/L (ref 7–15)
AST SERPL W P-5'-P-CCNC: 38 U/L (ref 0–45)
BILIRUB SERPL-MCNC: 0.3 MG/DL
BUN SERPL-MCNC: 8.4 MG/DL (ref 6–20)
CALCIUM SERPL-MCNC: 8.8 MG/DL (ref 8.8–10.4)
CHLORIDE SERPL-SCNC: 102 MMOL/L (ref 98–107)
CREAT SERPL-MCNC: 0.81 MG/DL (ref 0.51–0.95)
EGFRCR SERPLBLD CKD-EPI 2021: >90 ML/MIN/1.73M2
GLUCOSE SERPL-MCNC: 90 MG/DL (ref 70–99)
HBV CORE AB SERPL QL IA: NONREACTIVE
HBV SURFACE AG SERPL QL IA: NONREACTIVE
HCO3 SERPL-SCNC: 23 MMOL/L (ref 22–29)
HGB BLD-MCNC: 7.2 G/DL (ref 11.7–15.7)
PLATELET # BLD AUTO: 258 10E3/UL (ref 150–450)
POTASSIUM SERPL-SCNC: 3.4 MMOL/L (ref 3.4–5.3)
PROT SERPL-MCNC: 5.6 G/DL (ref 6.4–8.3)
SODIUM SERPL-SCNC: 136 MMOL/L (ref 135–145)

## 2025-02-11 PROCEDURE — 250N000013 HC RX MED GY IP 250 OP 250 PS 637: Performed by: OBSTETRICS & GYNECOLOGY

## 2025-02-11 PROCEDURE — 85049 AUTOMATED PLATELET COUNT: CPT | Performed by: OBSTETRICS & GYNECOLOGY

## 2025-02-11 PROCEDURE — 250N000011 HC RX IP 250 OP 636: Performed by: OBSTETRICS & GYNECOLOGY

## 2025-02-11 PROCEDURE — 80053 COMPREHEN METABOLIC PANEL: CPT | Performed by: OBSTETRICS & GYNECOLOGY

## 2025-02-11 PROCEDURE — 36415 COLL VENOUS BLD VENIPUNCTURE: CPT | Performed by: OBSTETRICS & GYNECOLOGY

## 2025-02-11 PROCEDURE — 85018 HEMOGLOBIN: CPT | Performed by: OBSTETRICS & GYNECOLOGY

## 2025-02-11 RX ORDER — AMOXICILLIN 250 MG
1 CAPSULE ORAL 2 TIMES DAILY
COMMUNITY
Start: 2025-02-11

## 2025-02-11 RX ORDER — IBUPROFEN 800 MG/1
800 TABLET, FILM COATED ORAL EVERY 8 HOURS PRN
COMMUNITY
Start: 2025-02-11

## 2025-02-11 RX ORDER — NIFEDIPINE 30 MG
30 TABLET, EXTENDED RELEASE ORAL EVERY EVENING
Qty: 90 TABLET | Refills: 0 | Status: SHIPPED | OUTPATIENT
Start: 2025-02-11

## 2025-02-11 RX ORDER — SIMETHICONE 80 MG
80 TABLET,CHEWABLE ORAL 4 TIMES DAILY PRN
COMMUNITY
Start: 2025-02-11

## 2025-02-11 RX ORDER — OXYCODONE HYDROCHLORIDE 5 MG/1
5-10 TABLET ORAL EVERY 6 HOURS PRN
Qty: 12 TABLET | Refills: 0 | Status: SHIPPED | OUTPATIENT
Start: 2025-02-11

## 2025-02-11 RX ORDER — LABETALOL 200 MG/1
200 TABLET, FILM COATED ORAL EVERY 8 HOURS
Qty: 120 TABLET | Refills: 0 | Status: SHIPPED | OUTPATIENT
Start: 2025-02-11

## 2025-02-11 RX ADMIN — NIFEDIPINE 60 MG: 30 TABLET, EXTENDED RELEASE ORAL at 08:58

## 2025-02-11 RX ADMIN — ENOXAPARIN SODIUM 40 MG: 40 INJECTION SUBCUTANEOUS at 08:58

## 2025-02-11 RX ADMIN — LABETALOL HYDROCHLORIDE 200 MG: 200 TABLET ORAL at 06:33

## 2025-02-11 RX ADMIN — IBUPROFEN 800 MG: 800 TABLET ORAL at 05:34

## 2025-02-11 RX ADMIN — BUPROPION HYDROCHLORIDE 300 MG: 300 TABLET, EXTENDED RELEASE ORAL at 08:58

## 2025-02-11 RX ADMIN — LEVOTHYROXINE SODIUM 25 MCG: 0.03 TABLET ORAL at 07:47

## 2025-02-11 RX ADMIN — SENNOSIDES AND DOCUSATE SODIUM 2 TABLET: 50; 8.6 TABLET ORAL at 09:00

## 2025-02-11 ASSESSMENT — ACTIVITIES OF DAILY LIVING (ADL)
ADLS_ACUITY_SCORE: 24

## 2025-02-11 NOTE — PLAN OF CARE
Problem: Adult Inpatient Plan of Care  Goal: Optimal Comfort and Wellbeing  Intervention: Monitor Pain and Promote Comfort  Recent Flowsheet Documentation  Taken 2/11/2025 0323 by Alma Bo, RN  Pain Management Interventions:   repositioned   rest     Problem: Hypertension Acute  Goal: Blood Pressure Within Desired Range  Outcome: Progressing         Goal Outcome Evaluation:      Plan of Care Reviewed With: patient, spouse    Overall Patient Progress: improving    Outcome Evaluation: Patients VSS, voiding, ambulating, reporting miminal pain, scheduled motrin given, denies HA and vision changes, incision CDI      Alma oB, RN on 2/11/2025 at 3:33 AM

## 2025-02-11 NOTE — PLAN OF CARE
Goal Outcome Evaluation: Progressing       Plan of Care Reviewed With: patient, spouse    Overall Patient Progress: improvingOverall Patient Progress: improving    Outcome Evaluation: Sourav's HR, RR and Temp WDL. BP is labile and elevated.  There is a significant difference between regular width long BP cuff and large BP cuff.  Arm circumference indicates large BP cuff is her size, though it appears to be too wide.  She denies HA and vision changes, no epigastric pain; reflexes +1 or 0; no clonus.  MInimal edema. CMP labs to be repeated in the morning.  Sourav has denied pain all shift and has only taken scheduled Ibuprofen.  She's ambulating and voiding very adequate amounts. Sourav has been pumping and getting small amounts of colostrum; she has not put baby to breast this shift.  Dad is feeding 22 chalo formula with Dr. Pemberton's bottle with premie nipple per OT today.    Problem: Postpartum ( Delivery)  Goal: Successful Parent Role Transition  Outcome: Progressing  Intervention: Support Parent Role Transition  Recent Flowsheet Documentation  Taken 2/10/2025 1615 by Yovana Castro RN  Supportive Measures:   active listening utilized   counseling provided   decision-making supported   positive reinforcement provided   self-care encouraged  Parent-Child Attachment Promotion:   caring behavior modeled   cue recognition promoted   face-to-face positioning promoted   interaction encouraged   participation in care promoted   positive reinforcement provided   rooming-in promoted

## 2025-02-11 NOTE — DISCHARGE SUMMARY
Discharge Summary    Sourav Mascorro MRN# [id number]   Age: 34 year old YOB: 1990     Date of Admission:  2025  Date of Discharge::  2025  Admitting Physician:  Quentin Bernard MD  Discharge Physician:  Holley Haq MD     Home clinic: [facility]          Admission Diagnoses:   Pre-eclampsia with severe features  Breech presentation  Intrauterine pregnancy at 35w2d          Discharge Diagnosis:   Same   Acute blood loss anemia, asymptomatic  S/p  delivery          Procedures:     Procedure(s): Low transverse primery  delivery via Pfannenstiel incision  No additional procedures performed              Medications Prior to Admission:     Medications Prior to Admission   Medication Sig Dispense Refill Last Dose/Taking    albuterol (PROAIR HFA/PROVENTIL HFA/VENTOLIN HFA) 108 (90 Base) MCG/ACT inhaler Inhale 2 puffs into the lungs every 4 hours as needed for cough.   More than a month    aspirin (ASA) 81 MG chewable tablet Take 81 mg by mouth daily.   2025    buPROPion (WELLBUTRIN XL) 300 MG 24 hr tablet Take 1 tablet (300 mg) by mouth every morning. 90 tablet 3 2025    Ferrous Sulfate (IRON PO) Take by mouth.   2025    ketoconazole (NIZORAL) 2 % external shampoo Apply topically daily as needed for itching or irritation. 120 mL 3 2025    levothyroxine (SYNTHROID) 25 MCG tablet Take 1 tablet (25 mcg) by mouth daily. 90 tablet 3 2025    Prenatal Vit-Fe Fumarate-FA (PRENATAL MULTIVITAMIN  PLUS IRON) 27-1 MG TABS Take 1 tablet by mouth daily   2025             Discharge Medications:        Review of your medicines        START taking        Dose / Directions   ibuprofen 800 MG tablet  Commonly known as: ADVIL/MOTRIN  Used for: S/P  section      Dose: 800 mg  Take 1 tablet (800 mg) by mouth every 8 hours as needed for moderate pain.  Refills: 0     labetalol 200 MG tablet  Commonly known as: NORMODYNE  Used for: S/P   section, Pre-eclampsia in postpartum period      Dose: 200 mg  Take 1 tablet (200 mg) by mouth every 8 hours.  Quantity: 120 tablet  Refills: 0     * NIFEdipine ER 30 MG 24 hr tablet  Commonly known as: ADALAT CC  Used for: S/P  section, Pre-eclampsia in postpartum period      Dose: 30 mg  Take 1 tablet (30 mg) by mouth every evening.  Quantity: 90 tablet  Refills: 0     * NIFEdipine ER 60 MG 24 hr tablet  Commonly known as: ADALAT CC  Used for: S/P  section, Pre-eclampsia in postpartum period      Dose: 60 mg  Take 1 tablet (60 mg) by mouth every morning.  Quantity: 90 tablet  Refills: 0     oxyCODONE 5 MG tablet  Commonly known as: ROXICODONE  Used for: S/P  section      Dose: 5-10 mg  Take 1-2 tablets (5-10 mg) by mouth every 6 hours as needed for moderate pain.  Quantity: 12 tablet  Refills: 0     senna-docusate 8.6-50 MG tablet  Commonly known as: SENOKOT-S/PERICOLACE  Used for: S/P  section      Dose: 1 tablet  Take 1 tablet by mouth 2 times daily.  Refills: 0     simethicone 80 MG chewable tablet  Commonly known as: MYLICON  Used for: S/P  section      Dose: 80 mg  Take 1 tablet (80 mg) by mouth 4 times daily as needed for other (gas).  Refills: 0           * This list has 2 medication(s) that are the same as other medications prescribed for you. Read the directions carefully, and ask your doctor or other care provider to review them with you.                CONTINUE these medicines which have NOT CHANGED        Dose / Directions   albuterol 108 (90 Base) MCG/ACT inhaler  Commonly known as: PROAIR HFA/PROVENTIL HFA/VENTOLIN HFA      Dose: 2 puff  Inhale 2 puffs into the lungs every 4 hours as needed for cough.  Refills: 0     aspirin 81 MG chewable tablet  Commonly known as: ASA      Dose: 81 mg  Take 81 mg by mouth daily.  Refills: 0     buPROPion 300 MG 24 hr tablet  Commonly known as: WELLBUTRIN XL  Used for: Major depressive disorder with current active episode,  unspecified depression episode severity, unspecified whether recurrent      Dose: 300 mg  Take 1 tablet (300 mg) by mouth every morning.  Quantity: 90 tablet  Refills: 3     IRON PO      Take by mouth.  Refills: 0     ketoconazole 2 % external shampoo  Commonly known as: NIZORAL  Used for: Dermatitis, seborrheic      Apply topically daily as needed for itching or irritation.  Quantity: 120 mL  Refills: 3     levothyroxine 25 MCG tablet  Commonly known as: Synthroid  Used for: Subclinical hypothyroidism, Pregnancy, incidental      Dose: 25 mcg  Take 1 tablet (25 mcg) by mouth daily.  Quantity: 90 tablet  Refills: 3     prenatal multivitamin  plus iron 27-1 MG Tabs      Dose: 1 tablet  Take 1 tablet by mouth daily  Refills: 0               Where to get your medicines        These medications were sent to [facility] [address] [address]      Phone: [phone]   NIFEdipine ER 60 MG 24 hr tablet       Some of these will need a paper prescription and others can be bought over the counter. Ask your nurse if you have questions.    Bring a paper prescription for each of these medications  labetalol 200 MG tablet  NIFEdipine ER 30 MG 24 hr tablet  oxyCODONE 5 MG tablet  You don't need a prescription for these medications  ibuprofen 800 MG tablet  senna-docusate 8.6-50 MG tablet  simethicone 80 MG chewable tablet               Consultations:   No consultations were requested during this admission            Brief History of Labor:   Sourav Mascorro is a 34 year old  who presented from [facility] clinic due to elevated BP.  She was diagnosed with pre-eclampsia with severe features, baby breech and once delivery recommended ECV attempted but unsuccessful.  Patient underwent a primary csarean on .           Hospital Course:   Patient's post-operative course was complicated by anemia and BP control.  On POD#4 her BP's were  adequately controlled on labetalol 200 mg q 8 hours, procardia XR 60 mg qam and 30 mg q pm.      On day of discharge her Bp is well-controlled.  She is ambulating without difficulty.  Passing flatus and urinating without incident.  Feeling ready for discharge to home, already has a BP cuff at home and already has a home health visit scheduled for tomorrow.    Hep B labs returned consistent with immunity from prior vaccination.  AST/ALT improved today vs yesterday.    Post-partum hemoglobin:   Hemoglobin   Date Value Ref Range Status   02/11/2025 7.2 (L) 11.7 - 15.7 g/dL Final       Day of discharge assessment:   /74 (BP Location: Right arm)   Pulse 78   Temp 98.5  F (36.9  C) (Oral)   Resp 18   Wt 120.7 kg (266 lb 1.6 oz)   LMP 06/04/2024 (Exact Date)   SpO2 99%   Breastfeeding Unknown   BMI 43.28 kg/m    Abdomen: Soft, fundus firm, incision bandag intact.          Discharge Instructions and Follow-Up:     Discharge diet: Regular   Discharge activity: Your activity upon discharge: no lifting >15 lbs or strenuous exercise for 6 weeks, no driving for 2 weeks or until you can slam on the breaks w/o pain, nothing in the vagina for 6 weeks (no tampons, intercourse, douching).    Discharge follow-up: Within1 week for incision check and BP chec  6 weeks for postpartum visit.   Wound care: Remove bandage 7 days after surgery  Keep incision clean and dry.           Discharge Disposition:     Discharged to home      Attestation:  I have reviewed today's vital signs, notes, medications, labs and imaging.  Amount of time performed on this discharge summary: 20 minutes.  Total time: 20 minutes    Holley Haq MD

## 2025-02-11 NOTE — PROGRESS NOTES
D:  Patient desires discharge home.  Discharge orders received and entered by provider.  A:  Discharge instructions reviewed with the patient.  All questions and concerns addressed.  R:  Discharge criteria met.  4 Part ID bands double checked.   discharged in car seat with parents.  The nursing assistant escorted patient to car.

## 2025-02-12 ENCOUNTER — PATIENT OUTREACH (OUTPATIENT)
Dept: CARE COORDINATION | Facility: CLINIC | Age: 35
End: 2025-02-12
Payer: COMMERCIAL

## 2025-02-12 NOTE — PROGRESS NOTES
Clinic Care Coordination Contact  Care Coordination Clinician Chart Review    Situation: Patient chart reviewed by care coordinator.    Background: Clinic Care Coordination Referral received from inpatient care team for transition handoff communication following hospital admission.    Assessment: Upon chart review, patient is not a candidate for Primary Care Clinic Care Coordination enrollment due to reason stated below:  Per primary care coordination standard work document, TCM is not indicated for an OB/GYN admission.    Plan/Recommendations: Clinic Care Coordination Referral/order cancelled. RN/SW CC will perform no further monitoring/outreaches at this time and will remain available as needed. If new needs arise, a new Care Coordination Referral may be placed.    Ella Romero RN, BSN, PHN Care Coordinator  [place], [place], and [facility]   Phone: [phone]

## 2025-02-14 ENCOUNTER — MEDICAL CORRESPONDENCE (OUTPATIENT)
Dept: HEALTH INFORMATION MANAGEMENT | Facility: CLINIC | Age: 35
End: 2025-02-14
Payer: COMMERCIAL

## 2025-03-05 ENCOUNTER — TELEPHONE (OUTPATIENT)
Dept: PEDIATRICS | Facility: CLINIC | Age: 35
End: 2025-03-05
Payer: COMMERCIAL

## 2025-03-17 ENCOUNTER — APPOINTMENT (OUTPATIENT)
Dept: CT IMAGING | Facility: HOSPITAL | Age: 35
End: 2025-03-17
Attending: EMERGENCY MEDICINE
Payer: COMMERCIAL

## 2025-03-17 ENCOUNTER — HOSPITAL ENCOUNTER (EMERGENCY)
Facility: HOSPITAL | Age: 35
Discharge: HOME | End: 2025-03-17
Attending: EMERGENCY MEDICINE | Admitting: EMERGENCY MEDICINE
Payer: COMMERCIAL

## 2025-03-17 VITALS
HEIGHT: 64 IN | WEIGHT: 266.6 LBS | HEART RATE: 69 BPM | BODY MASS INDEX: 45.52 KG/M2 | DIASTOLIC BLOOD PRESSURE: 70 MMHG | OXYGEN SATURATION: 98 % | SYSTOLIC BLOOD PRESSURE: 124 MMHG | TEMPERATURE: 97.7 F | RESPIRATION RATE: 13 BRPM

## 2025-03-17 DIAGNOSIS — R07.89 ATYPICAL CHEST PAIN: ICD-10-CM

## 2025-03-17 DIAGNOSIS — E03.8 SUBCLINICAL HYPOTHYROIDISM: ICD-10-CM

## 2025-03-17 LAB
ALBUMIN MFR UR ELPH: 6.7 MG/DL
ALBUMIN SERPL BCG-MCNC: 4.1 G/DL (ref 3.5–5.2)
ALBUMIN UR-MCNC: NEGATIVE MG/DL
ALP SERPL-CCNC: 142 U/L (ref 40–150)
ALT SERPL W P-5'-P-CCNC: 39 U/L (ref 0–50)
ANION GAP SERPL CALCULATED.3IONS-SCNC: 12 MMOL/L (ref 7–15)
APPEARANCE UR: CLEAR
AST SERPL W P-5'-P-CCNC: 36 U/L (ref 0–45)
BACTERIA #/AREA URNS HPF: ABNORMAL /HPF
BASOPHILS # BLD AUTO: 0.1 10E3/UL (ref 0–0.2)
BASOPHILS NFR BLD AUTO: 1 %
BILIRUB DIRECT SERPL-MCNC: <0.08 MG/DL (ref 0–0.3)
BILIRUB SERPL-MCNC: <0.2 MG/DL
BILIRUB UR QL STRIP: NEGATIVE
BUN SERPL-MCNC: 16.2 MG/DL (ref 6–20)
CALCIUM SERPL-MCNC: 9.2 MG/DL (ref 8.8–10.4)
CHLORIDE SERPL-SCNC: 102 MMOL/L (ref 98–107)
COLOR UR AUTO: ABNORMAL
CREAT SERPL-MCNC: 1.06 MG/DL (ref 0.51–0.95)
CREAT UR-MCNC: 72.4 MG/DL
D DIMER PPP FEU-MCNC: 1.56 UG/ML FEU (ref 0–0.5)
EGFRCR SERPLBLD CKD-EPI 2021: 70 ML/MIN/1.73M2
EOSINOPHIL # BLD AUTO: 0.4 10E3/UL (ref 0–0.7)
EOSINOPHIL NFR BLD AUTO: 4 %
ERYTHROCYTE [DISTWIDTH] IN BLOOD BY AUTOMATED COUNT: 13.2 % (ref 10–15)
FIBRINOGEN PPP-MCNC: 382 MG/DL (ref 170–510)
GLUCOSE SERPL-MCNC: 98 MG/DL (ref 70–99)
GLUCOSE UR STRIP-MCNC: NEGATIVE MG/DL
HAPTOGLOB SERPL-MCNC: 222 MG/DL (ref 30–200)
HCO3 SERPL-SCNC: 23 MMOL/L (ref 22–29)
HCT VFR BLD AUTO: 33.4 % (ref 35–47)
HGB BLD-MCNC: 11 G/DL (ref 11.7–15.7)
HGB UR QL STRIP: NEGATIVE
HOLD SPECIMEN: NORMAL
IMM GRANULOCYTES # BLD: 0.1 10E3/UL
IMM GRANULOCYTES NFR BLD: 1 %
INR PPP: 0.88 (ref 0.85–1.15)
KETONES UR STRIP-MCNC: NEGATIVE MG/DL
LDH SERPL L TO P-CCNC: 249 U/L (ref 0–250)
LEUKOCYTE ESTERASE UR QL STRIP: NEGATIVE
LIPASE SERPL-CCNC: 39 U/L (ref 13–60)
LYMPHOCYTES # BLD AUTO: 2.5 10E3/UL (ref 0.8–5.3)
LYMPHOCYTES NFR BLD AUTO: 23 %
MAGNESIUM SERPL-MCNC: 2.1 MG/DL (ref 1.7–2.3)
MCH RBC QN AUTO: 29.6 PG (ref 26.5–33)
MCHC RBC AUTO-ENTMCNC: 32.9 G/DL (ref 31.5–36.5)
MCV RBC AUTO: 90 FL (ref 78–100)
MONOCYTES # BLD AUTO: 0.7 10E3/UL (ref 0–1.3)
MONOCYTES NFR BLD AUTO: 7 %
NEUTROPHILS # BLD AUTO: 7.1 10E3/UL (ref 1.6–8.3)
NEUTROPHILS NFR BLD AUTO: 66 %
NITRATE UR QL: NEGATIVE
NRBC # BLD AUTO: 0 10E3/UL
NRBC BLD AUTO-RTO: 0 /100
PH UR STRIP: 6 [PH] (ref 5–7)
PLATELET # BLD AUTO: 399 10E3/UL (ref 150–450)
POTASSIUM SERPL-SCNC: 3.4 MMOL/L (ref 3.4–5.3)
PROT SERPL-MCNC: 6.8 G/DL (ref 6.4–8.3)
PROT/CREAT 24H UR: 0.09 MG/MG CR (ref 0–0.2)
RBC # BLD AUTO: 3.71 10E6/UL (ref 3.8–5.2)
RBC URINE: 1 /HPF
SODIUM SERPL-SCNC: 137 MMOL/L (ref 135–145)
SP GR UR STRIP: 1.01 (ref 1–1.03)
SQUAMOUS EPITHELIAL: 1 /HPF
T4 FREE SERPL-MCNC: 1.17 NG/DL (ref 0.9–1.7)
TROPONIN T SERPL HS-MCNC: <6 NG/L
TSH SERPL DL<=0.005 MIU/L-ACNC: 8.07 UIU/ML (ref 0.3–4.2)
UROBILINOGEN UR STRIP-MCNC: <2 MG/DL
WBC # BLD AUTO: 10.8 10E3/UL (ref 4–11)
WBC URINE: 3 /HPF

## 2025-03-17 PROCEDURE — 80053 COMPREHEN METABOLIC PANEL: CPT | Performed by: EMERGENCY MEDICINE

## 2025-03-17 PROCEDURE — 36415 COLL VENOUS BLD VENIPUNCTURE: CPT | Performed by: STUDENT IN AN ORGANIZED HEALTH CARE EDUCATION/TRAINING PROGRAM

## 2025-03-17 PROCEDURE — 250N000011 HC RX IP 250 OP 636: Performed by: EMERGENCY MEDICINE

## 2025-03-17 PROCEDURE — 85384 FIBRINOGEN ACTIVITY: CPT | Performed by: EMERGENCY MEDICINE

## 2025-03-17 PROCEDURE — 84156 ASSAY OF PROTEIN URINE: CPT | Performed by: EMERGENCY MEDICINE

## 2025-03-17 PROCEDURE — 85610 PROTHROMBIN TIME: CPT | Performed by: EMERGENCY MEDICINE

## 2025-03-17 PROCEDURE — 85041 AUTOMATED RBC COUNT: CPT | Performed by: EMERGENCY MEDICINE

## 2025-03-17 PROCEDURE — 83735 ASSAY OF MAGNESIUM: CPT | Performed by: EMERGENCY MEDICINE

## 2025-03-17 PROCEDURE — 93005 ELECTROCARDIOGRAM TRACING: CPT | Performed by: STUDENT IN AN ORGANIZED HEALTH CARE EDUCATION/TRAINING PROGRAM

## 2025-03-17 PROCEDURE — 84439 ASSAY OF FREE THYROXINE: CPT | Performed by: EMERGENCY MEDICINE

## 2025-03-17 PROCEDURE — 84484 ASSAY OF TROPONIN QUANT: CPT | Performed by: EMERGENCY MEDICINE

## 2025-03-17 PROCEDURE — 82248 BILIRUBIN DIRECT: CPT | Performed by: EMERGENCY MEDICINE

## 2025-03-17 PROCEDURE — 83010 ASSAY OF HAPTOGLOBIN QUANT: CPT | Performed by: EMERGENCY MEDICINE

## 2025-03-17 PROCEDURE — 83615 LACTATE (LD) (LDH) ENZYME: CPT | Performed by: EMERGENCY MEDICINE

## 2025-03-17 PROCEDURE — 93005 ELECTROCARDIOGRAM TRACING: CPT | Performed by: EMERGENCY MEDICINE

## 2025-03-17 PROCEDURE — 71275 CT ANGIOGRAPHY CHEST: CPT

## 2025-03-17 PROCEDURE — 85379 FIBRIN DEGRADATION QUANT: CPT | Performed by: EMERGENCY MEDICINE

## 2025-03-17 PROCEDURE — 81003 URINALYSIS AUTO W/O SCOPE: CPT | Performed by: EMERGENCY MEDICINE

## 2025-03-17 PROCEDURE — 84443 ASSAY THYROID STIM HORMONE: CPT | Performed by: EMERGENCY MEDICINE

## 2025-03-17 PROCEDURE — 99285 EMERGENCY DEPT VISIT HI MDM: CPT | Mod: 25 | Performed by: EMERGENCY MEDICINE

## 2025-03-17 PROCEDURE — 83690 ASSAY OF LIPASE: CPT | Performed by: EMERGENCY MEDICINE

## 2025-03-17 PROCEDURE — 85004 AUTOMATED DIFF WBC COUNT: CPT | Performed by: EMERGENCY MEDICINE

## 2025-03-17 RX ORDER — IOPAMIDOL 755 MG/ML
90 INJECTION, SOLUTION INTRAVASCULAR ONCE
Status: COMPLETED | OUTPATIENT
Start: 2025-03-17 | End: 2025-03-17

## 2025-03-17 RX ADMIN — IOPAMIDOL 90 ML: 755 INJECTION, SOLUTION INTRAVENOUS at 07:38

## 2025-03-17 ASSESSMENT — COLUMBIA-SUICIDE SEVERITY RATING SCALE - C-SSRS
6. HAVE YOU EVER DONE ANYTHING, STARTED TO DO ANYTHING, OR PREPARED TO DO ANYTHING TO END YOUR LIFE?: NO
2. HAVE YOU ACTUALLY HAD ANY THOUGHTS OF KILLING YOURSELF IN THE PAST MONTH?: NO
1. IN THE PAST MONTH, HAVE YOU WISHED YOU WERE DEAD OR WISHED YOU COULD GO TO SLEEP AND NOT WAKE UP?: NO

## 2025-03-17 ASSESSMENT — ACTIVITIES OF DAILY LIVING (ADL)
ADLS_ACUITY_SCORE: 50

## 2025-03-17 NOTE — ED PROVIDER NOTES
"EMERGENCY DEPARTMENT ENCOUNTER      NAME: Sourav Mascorro  AGE: 34 year old female  YOB: 1990  MRN: [id number]  EVALUATION DATE & TIME: 3/17/2025  3:53 AM    PCP: Otilia Wilson    ED PROVIDER: Rose Soriano M.D.      Chief Complaint   Patient presents with    Chest Pain    Hypertension       FINAL IMPRESSION:  1. Atypical chest pain    2. Subclinical hypothyroidism        ED COURSE & MEDICAL DECISION MAKIN. Chest \"hollow sensation\", s/p c section due to preeclampsia 5 weeks ago.  EKG stable, no ischemic changes.  BP initially elevated but improved without intervention.  Preeclampsia order set used for lab test ordered, reassuring findings.      5:19 AM I met with the patient to gather history and to perform my initial exam. I discussed the plan for care while in the Emergency Department.  ED Course as of 25 0757   Mon Mar 17, 2025   0527 EKG reviewed by myself at 0402 and shows sinus rhythm rate 97, Qtc 485, compared to previous EKG 25. Qtc slightly prolonged. No VARGHESE or depression noted.  I have independently reviewed and interpreted today's EKG, pending Cardiologist read.   0528 Reviewed patient's blood work, troponin negative less than 6, EKG stable, sinus rhythm.  I added on a D-dimer, cannot PERC out secondary to recent  and pregnancy.    0649 Dimer elevated, CT PE ordered. Lipase normal. Hepatic function normal. Troponin <6. TSH elevated but T4 normal. Hb stable. WBC stable. No proteinuria.     Pertinent Labs & Imaging studies reviewed. (See chart for details).    Medical Decision Making  Obtained supplemental history:Supplemental history obtained?: Documented in chart  Reviewed external records: External records reviewed?: Other: Documented in chart, if applicable  Care impacted by chronic illness:Documented in Chart  Did you consider but not order tests?: Work up considered but not performed and documented in chart, if applicable  Did you interpret images " "independently?: Independent interpretation of ECG and images noted in documentation, when applicable.  Consultation discussion with other provider:Did you involve another provider (consultant, , pharmacy, etc.)?: I discussed the care with another health care provider, see documentation for details.  Admission considered. Patient was signed out to the oncoming physician, disposition pending.    MIPS (CTPE, Dental pain, [place], Sinusitis, Asthma/COPD, Head Trauma): CT Pulmonary Angiogram:The patient had an abnormal d-dimer.    SEPSIS: None          At the conclusion of the encounter I discussed the results of all of the tests and the disposition. The questions were answered. The patient or family acknowledged understanding and was agreeable with the care plan.      CRITICAL CARE:  N/A    HPI    Patient information was obtained from: The patient, significant other    Use of : N/A.       Sourav Mascorro is a 34 year old female who presents with     The patient is 5 weeks postpartum and presents to the ED with concerns of an elevated BP along with lower sternal chest wall that radiates through to the her back (at the exact same spot as where her chest pain is). Notes that she feels like the center of the chest feels \"hollow\" right now. Rates this chest pain a 5/10 right now. She is currently taking BP medications. States that when she was attempting to go to sleep last night, she could not find a comfortable position to sleep in at all after repositioning herself multiple times. She woke up with this chest pain, and the pain has been constant since onset. She describes the pain as uncomfortable and painful. She took ibuprofen for the pain, but no relief afterwards. Denies having any heart burn, vomiting or feeling nauseated. She did feel slightly nauseated earlier, but this symptom resolved. Denies any significant headache, vision changes, cough, sore throat, runny nose or any other URI symptoms. No " "unilateral leg swelling.     She was diagnosed with preeclampsia with severe features during her pregnancy and had to deliver earlier than expected. She delivered via . She has been cleaning the incision site every day and applying antifungal cream on it as well, per significant other. Denies a history of seizures of eclampsia. No history of blood clots.    She's been feeing her  child mostly using formula milk lately. She does pump a little, but not often. She has edema in his bilateral lower extremity and this has gotten worse since she delivered. She's been on Nifedipine since prior to delivery.    Her maternal grandfather passed away from a sudden heart attack while he was sleeping when he was 41 years old. Denies a FHx of cardiac malformation.    REVIEW OF SYSTEMS  All other systems negative unless noted in HPI.    PAST MEDICAL HISTORY:  Past Medical History:   Diagnosis Date    Cutaneous lupus erythematosus 2024    Had a skin rash on her right forearm for 7 years, biopsied and diagnosed with cutaneous lupus in 2022, does not have \"full-blown lupus\"     10/11/24: [facility] Consult:  -She has not been diagnosed with SLE and states she is scheduled to follow up with Dermatology on 10/22 but has not required any treatment for this.   -We reviewed that pregnancy outcomes for women with JOSE are generally very fa    Depressive disorder     Since age 12, on Wellbutrin since 2016.  No therapist at this time.  No mental health hospitalizations in her past.    Eating disorder     Reactive airway disease 2023    Exercise-induced, uses albuterol as needed.    Thyroid disease        PAST SURGICAL HISTORY:  Past Surgical History:   Procedure Laterality Date     SECTION N/A 2025    Procedure:  SECTION;  Surgeon: Quentin Bernard MD;  Location: [facility] REMOVAL OF TONSILS,<13 Y/O      Description: Tonsillectomy;  Recorded: 2012;         CURRENT " MEDICATIONS:    No current facility-administered medications for this encounter.     Current Outpatient Medications   Medication Sig Dispense Refill    albuterol (PROAIR HFA/PROVENTIL HFA/VENTOLIN HFA) 108 (90 Base) MCG/ACT inhaler Inhale 2 puffs into the lungs every 4 hours as needed for cough.      aspirin (ASA) 81 MG chewable tablet Take 81 mg by mouth daily.      buPROPion (WELLBUTRIN XL) 300 MG 24 hr tablet Take 1 tablet (300 mg) by mouth every morning. 90 tablet 3    Ferrous Sulfate (IRON PO) Take by mouth.      ibuprofen (ADVIL/MOTRIN) 800 MG tablet Take 1 tablet (800 mg) by mouth every 8 hours as needed for moderate pain.      ketoconazole (NIZORAL) 2 % external shampoo Apply topically daily as needed for itching or irritation. 120 mL 3    labetalol (NORMODYNE) 200 MG tablet Take 1 tablet (200 mg) by mouth every 8 hours. 120 tablet 0    levothyroxine (SYNTHROID) 25 MCG tablet Take 1 tablet (25 mcg) by mouth daily. 90 tablet 3    NIFEdipine ER (ADALAT CC) 30 MG 24 hr tablet Take 1 tablet (30 mg) by mouth every evening. 90 tablet 0    NIFEdipine ER (ADALAT CC) 60 MG 24 hr tablet Take 1 tablet (60 mg) by mouth every morning. 90 tablet 0    oxyCODONE (ROXICODONE) 5 MG tablet Take 1-2 tablets (5-10 mg) by mouth every 6 hours as needed for moderate pain. 12 tablet 0    Prenatal Vit-Fe Fumarate-FA (PRENATAL MULTIVITAMIN  PLUS IRON) 27-1 MG TABS Take 1 tablet by mouth daily      senna-docusate (SENOKOT-S/PERICOLACE) 8.6-50 MG tablet Take 1 tablet by mouth 2 times daily.      simethicone (MYLICON) 80 MG chewable tablet Take 1 tablet (80 mg) by mouth 4 times daily as needed for other (gas).           ALLERGIES:  Allergies   Allergen Reactions    Nkda [No Known Drug Allergy]        FAMILY HISTORY:  Family History   Problem Relation Age of Onset    Breast Cancer Mother 37    Alcoholism Mother     Diabetes Father     Obesity Father     Alcoholism Father     Breast Cancer Maternal Grandmother     Heart Disease Paternal  Grandmother         chf    Depression Brother     Alcoholism Brother     Obesity Sister     Asthma Sister     Cancer Other         cervival    Breast Cancer Other        SOCIAL HISTORY:  Social History     Socioeconomic History    Marital status: Single     Spouse name: David Pérez    Number of children: 0    Years of education: Masters degree   Tobacco Use    Smoking status: Never     Passive exposure: Never    Smokeless tobacco: Never   Vaping Use    Vaping status: Never Used   Substance and Sexual Activity    Alcohol use: No    Drug use: No    Sexual activity: Yes     Partners: Male     Birth control/protection: None   Other Topics Concern    Parent/sibling w/ CABG, MI or angioplasty before 65F 55M? No   Social History Narrative    MSW at [employer]     Social Drivers of Health     Financial Resource Strain: High Risk (2/11/2025)    Financial Resource Strain     Within the past 12 months, have you or your family members you live with been unable to get utilities (heat, electricity) when it was really needed?: Yes   Food Insecurity: Low Risk  (2/11/2025)    Food Insecurity     Within the past 12 months, did you worry that your food would run out before you got money to buy more?: No     Within the past 12 months, did the food you bought just not last and you didn t have money to get more?: No   Transportation Needs: Low Risk  (2/11/2025)    Transportation Needs     Within the past 12 months, has lack of transportation kept you from medical appointments, getting your medicines, non-medical meetings or appointments, work, or from getting things that you need?: No   Physical Activity: Insufficiently Active (7/10/2024)    Exercise Vital Sign     Days of Exercise per Week: 3 days     Minutes of Exercise per Session: 30 min   Stress: Stress Concern Present (7/10/2024)    [place] [employer] - Occupational Stress Questionnaire     Feeling of Stress : To some extent   Social Connections: Unknown  "(7/10/2024)    Social Connection and Isolation Panel [NHANES]     Frequency of Social Gatherings with Friends and Family: More than three times a week   Interpersonal Safety: Low Risk  (2/5/2025)    Interpersonal Safety     Do you feel physically and emotionally safe where you currently live?: Yes     Within the past 12 months, have you been hit, slapped, kicked or otherwise physically hurt by someone?: No     Within the past 12 months, have you been humiliated or emotionally abused in other ways by your partner or ex-partner?: No   Housing Stability: Low Risk  (2/11/2025)    Housing Stability     Do you have housing? : Yes     Are you worried about losing your housing?: No       VITALS:  Patient Vitals for the past 24 hrs:   BP Temp Temp src Pulse Resp SpO2 Height Weight   03/17/25 0635 -- -- -- 75 17 98 % -- --   03/17/25 0630 135/68 -- -- 74 -- 100 % -- --   03/17/25 0559 135/71 -- -- 82 11 100 % -- --   03/17/25 0530 126/68 -- -- 86 12 99 % -- --   03/17/25 0459 114/59 -- -- 90 18 97 % -- --   03/17/25 0450 128/71 -- -- 93 14 97 % -- --   03/17/25 0422 133/73 -- -- 88 13 99 % -- --   03/17/25 0403 (!) 131/93 -- -- 102 15 100 % -- --   03/17/25 0357 (!) 182/102 97.7  F (36.5  C) Oral 105 22 98 % 1.626 m (5' 4\") 120.9 kg (266 lb 9.6 oz)       PHYSICAL EXAM    VITAL SIGNS: /68   Pulse 75   Temp 97.7  F (36.5  C) (Oral)   Resp 17   Ht 1.626 m (5' 4\")   Wt 120.9 kg (266 lb 9.6 oz)   LMP 06/04/2024 (Exact Date)   SpO2 98%   Breastfeeding No   BMI 45.76 kg/m    Physical Exam  Vitals and nursing note reviewed.   Constitutional:       General: She is not in acute distress.     Appearance: She is not toxic-appearing.   Eyes:      General: No scleral icterus.        Right eye: No discharge.         Left eye: No discharge.   Cardiovascular:      Rate and Rhythm: Normal rate and regular rhythm.   Pulmonary:      Effort: Pulmonary effort is normal. No respiratory distress.      Breath sounds: Normal breath " sounds.   Abdominal:      General: There is no distension.      Palpations: Abdomen is soft.      Tenderness: There is no abdominal tenderness.   Musculoskeletal:         General: No swelling or deformity.      Cervical back: Neck supple. No rigidity.   Skin:     General: Skin is warm and dry.      Capillary Refill: Capillary refill takes less than 2 seconds.      Findings: No bruising or erythema.   Neurological:      General: No focal deficit present.      Mental Status: She is alert and oriented to person, place, and time. Mental status is at baseline.      Comments: No slurred speech, following commands spontaneously. No facial droop.   Psychiatric:         Mood and Affect: Mood normal.         Behavior: Behavior normal.         LABS  Labs Ordered and Resulted from Time of ED Arrival to Time of ED Departure   BASIC METABOLIC PANEL - Abnormal       Result Value    Sodium 137      Potassium 3.4      Chloride 102      Carbon Dioxide (CO2) 23      Anion Gap 12      Urea Nitrogen 16.2      Creatinine 1.06 (*)     GFR Estimate 70      Calcium 9.2      Glucose 98     TSH WITH FREE T4 REFLEX - Abnormal    TSH 8.07 (*)    CBC WITH PLATELETS AND DIFFERENTIAL - Abnormal    WBC Count 10.8      RBC Count 3.71 (*)     Hemoglobin 11.0 (*)     Hematocrit 33.4 (*)     MCV 90      MCH 29.6      MCHC 32.9      RDW 13.2      Platelet Count 399      % Neutrophils 66      % Lymphocytes 23      % Monocytes 7      % Eosinophils 4      % Basophils 1      % Immature Granulocytes 1      NRBCs per 100 WBC 0      Absolute Neutrophils 7.1      Absolute Lymphocytes 2.5      Absolute Monocytes 0.7      Absolute Eosinophils 0.4      Absolute Basophils 0.1      Absolute Immature Granulocytes 0.1      Absolute NRBCs 0.0     ROUTINE UA WITH MICROSCOPIC REFLEX TO CULTURE - Abnormal    Color Urine Light Yellow      Appearance Urine Clear      Glucose Urine Negative      Bilirubin Urine Negative      Ketones Urine Negative      Specific Gravity Urine  1.012      Blood Urine Negative      pH Urine 6.0      Protein Albumin Urine Negative      Urobilinogen Urine <2.0      Nitrite Urine Negative      Leukocyte Esterase Urine Negative      Bacteria Urine Few (*)     RBC Urine 1      WBC Urine 3      Squamous Epithelials Urine 1     D DIMER QUANTITATIVE - Abnormal    D-Dimer Quantitative 1.56 (*)    INR - Normal    INR 0.88     LIPASE - Normal    Lipase 39     HEPATIC FUNCTION PANEL - Normal    Protein Total 6.8      Albumin 4.1      Bilirubin Total <0.2      Alkaline Phosphatase 142      AST 36      ALT 39      Bilirubin Direct <0.08     TROPONIN T, HIGH SENSITIVITY - Normal    Troponin T, High Sensitivity <6     MAGNESIUM - Normal    Magnesium 2.1     FIBRINOGEN ACTIVITY - Normal    Fibrinogen Activity 382     LACTATE DEHYDROGENASE - Normal    Lactate Dehydrogenase 249     T4 FREE - Normal    Free T4 1.17     PROTEIN RANDOM URINE    Total Protein Urine mg/dL 6.7      Total Protein Urine mg/mg Creat 0.09      Creatinine Urine mg/dL 72.4     HAPTOGLOBIN         RADIOLOGY  CT Chest Pulmonary Embolism w Contrast    (Results Pending)      NA      EKG:    EKG obtained at 3/17/25 04:02:34 and shows sinus rhythm rate, nonspecific ST and T wave abnormality, prolonged , Qtc 485, compared to previous EKG on 2/6/2025 23:27, nonspecific T wave abnormality now evident in Anterolateral leads. I have independently reviewed and interpreted today's EKG, pending Cardiologist read.     PROCEDURES:  N/A      MEDICATIONS GIVEN IN THE EMERGENCY:  Medications   iopamidol (ISOVUE-370) solution 90 mL (90 mLs Intravenous $Given 3/17/25 0738)       NEW PRESCRIPTIONS STARTED AT TODAY'S ER VISIT  New Prescriptions    No medications on file        I, Daisy Murray, am serving as a scribe to document services personally performed by Rose Soriano MD, based on my observations and the provider's statements to me.  I, Rose Soriano MD, attest that Daisy Murray is acting in a scribe capacity, has  observed my performance of the services and has documented them in accordance with my direction.     Rose Soriano MD  Emergency Medicine  [facility] EMERGENCY DEPARTMENT  [facility] [facility] [id number] [phone]  Dept: [phone]            Rose Soriano MD  03/17/25 0752

## 2025-03-17 NOTE — DISCHARGE INSTRUCTIONS
Your blood work is negative for heart strain/heart attack.  Your preeclampsia labs are stable.  Your thyroid was tested and shows subclinical hypothyroidism (normal T4 but elevated TSH). Please discuss with PCP.  We performed a CT PE to rule out blood clot in your lungs.

## 2025-03-17 NOTE — ED TRIAGE NOTES
Pt presents 5w PP with elevated BP and chest/epigastric pain that radiates into her back. Pt had preeclampsia during pregnancy with a premature delivery. Pt has been on Nifedipine since prior to delivery. Pt has been monitoring BP BID. Pt is anxious and tearful.     Triage Assessment (Adult)       Row Name 03/17/25 0358          Triage Assessment    Airway WDL WDL        Respiratory WDL    Respiratory WDL WDL        Skin Circulation/Temperature WDL    Skin Circulation/Temperature WDL WDL        Cardiac WDL    Cardiac WDL X;chest pain  HTN        Chest Pain Assessment    Chest Pain Location epigastric     Chest Pain Radiation back     Character pressure     Precipitating Factors nothing     Associated Signs/Symptoms anxiety;hypertension     Chest Pain Intervention cardiac monitor placed;12-lead ECG obtained;activity minimized        Peripheral/Neurovascular WDL    Peripheral Neurovascular WDL WDL        Cognitive/Neuro/Behavioral WDL    Cognitive/Neuro/Behavioral WDL WDL

## 2025-03-17 NOTE — ED PROVIDER NOTES
8:13 AM.  CAT scan came back negative for PE or other chest pathology.  Results communicated to the patient.  Patient discharged to home per Dr. Soriano previous instructions.  Patient in agreement.     Landon Lockett MD  03/17/25 0814

## 2025-03-19 LAB
ATRIAL RATE - MUSE: 97 BPM
DIASTOLIC BLOOD PRESSURE - MUSE: NORMAL MMHG
INTERPRETATION ECG - MUSE: NORMAL
P AXIS - MUSE: 60 DEGREES
PR INTERVAL - MUSE: 182 MS
QRS DURATION - MUSE: 96 MS
QT - MUSE: 382 MS
QTC - MUSE: 485 MS
R AXIS - MUSE: 62 DEGREES
SYSTOLIC BLOOD PRESSURE - MUSE: NORMAL MMHG
T AXIS - MUSE: 66 DEGREES
VENTRICULAR RATE- MUSE: 97 BPM

## 2025-03-21 ENCOUNTER — LAB REQUISITION (OUTPATIENT)
Dept: LAB | Facility: CLINIC | Age: 35
End: 2025-03-21
Payer: COMMERCIAL

## 2025-03-21 DIAGNOSIS — Z12.4 ENCOUNTER FOR SCREENING FOR MALIGNANT NEOPLASM OF CERVIX: ICD-10-CM

## 2025-03-21 PROCEDURE — 87624 HPV HI-RISK TYP POOLED RSLT: CPT | Mod: ORL | Performed by: OBSTETRICS & GYNECOLOGY

## 2025-03-21 PROCEDURE — G0145 SCR C/V CYTO,THINLAYER,RESCR: HCPCS | Mod: ORL | Performed by: OBSTETRICS & GYNECOLOGY

## 2025-03-24 LAB
HPV HR 12 DNA CVX QL NAA+PROBE: NEGATIVE
HPV16 DNA CVX QL NAA+PROBE: NEGATIVE
HPV18 DNA CVX QL NAA+PROBE: NEGATIVE
HUMAN PAPILLOMA VIRUS FINAL DIAGNOSIS: NORMAL

## 2025-03-26 LAB
BKR AP ASSOCIATED HPV REPORT: NORMAL
BKR LAB AP GYN ADEQUACY: NORMAL
BKR LAB AP GYN INTERPRETATION: NORMAL
BKR LAB AP LMP: NORMAL
BKR LAB AP PREVIOUS ABNL DX: NORMAL
BKR LAB AP PREVIOUS ABNORMAL: NORMAL
PATH REPORT.COMMENTS IMP SPEC: NORMAL
PATH REPORT.COMMENTS IMP SPEC: NORMAL
PATH REPORT.RELEVANT HX SPEC: NORMAL

## 2025-04-02 NOTE — CONSULTS
"Maternal-Fetal Medicine Consultation    Sourav Mascorro  : 1990  MRN: [id number]    Requesting provider: Dr. Reagan    HPI:  Sourav Mascorro is a 34 year old  at 35w2d by LMP admitted for preeclampsia on , at which time she had elevated blood pressures with non-sustained severe range blood pressures. She was diagnosed with gestational hypertension at that time and has been monitored closely as outpatient. Yesterday she was admitted for preeclampsia evaluation with new onset RUQ pain. Upon admission she again had non-sustained severe range blood pressures and was treated with Nifedipine IR 10 mg, as well as started on Nifedipine XL 30 mg daily. Since that time her blood pressures have been in the normal range. She has no other signs or symptoms pf preeclampsia.    Obstetrics History:  OB History    Para Term  AB Living   1 0 0 0 0 0   SAB IAB Ectopic Multiple Live Births   0 0 0 0 0      # Outcome Date GA Lbr Zhou/2nd Weight Sex Type Anes PTL Lv   1 Current              Past Medical History:  Past Medical History:   Diagnosis Date    Cutaneous lupus erythematosus 2024    Had a skin rash on her right forearm for 7 years, biopsied and diagnosed with cutaneous lupus in 2022, does not have \"full-blown lupus\"     10/11/24: M Consult:  -She has not been diagnosed with SLE and states she is scheduled to follow up with Dermatology on 10/22 but has not required any treatment for this.   -We reviewed that pregnancy outcomes for women with JOSE are generally very fa    Depressive disorder     Since age 12, on Wellbutrin since 2016.  No therapist at this time.  No mental health hospitalizations in her past.    Eating disorder     Reactive airway disease 2023    Exercise-induced, uses albuterol as needed.    Thyroid disease      Past Surgical History:  Past Surgical History:   Procedure Laterality Date    HC REMOVAL OF TONSILS,<11 Y/O      Description: Tonsillectomy;  " NOTIFICATION OF INPATIENT ADMISSION   AUTHORIZATION REQUEST   SERVICING FACILITY:   [facility]  Address: [address] [place] [address]  Tax ID: [id number]  NPI: [id number] ATTENDING PROVIDER:  Attending Name and NPI#: Yaima Crocker Md [[id number]]  Address: [address] [place] [address]  Phone: [phone]   ADMISSION INFORMATION:  Place of Service: Inpatient [facility]  Place of Service Code: 21  Inpatient Admission Date/Time: 3/31/25  1:02 PM  Discharge Date/Time: No discharge date for patient encounter.  Admitting Diagnosis Code/Description:  Acute ischemic stroke (HCC) [I63.9]  Stroke-like symptoms [R29.90]  National Institutes of Health (NIH) Stroke Scale level of consciousness score 0, alert; keenly responsive [Z78.9]     UTILIZATION REVIEW CONTACT:  Matthias Mejias Utilization   Network Utilization Review Department  Phone: [phone]  Fax: [phone]  Email: [email or web address]  Contact for approvals/pending authorizations, clinical reviews, and discharge.     PHYSICIAN ADVISORY SERVICES:  Medical Necessity Denial & Hbkl-[id number] Review  Phone: [phone]  Fax: [phone]  Email: [email or web address]     DISCHARGE SUPPORT TEAM:  For Patients Discharge Needs & Updates  Phone: [phone] opt. 2 Fax: [phone]  Email: [email or web address]       Recorded: 01/13/2012;     Current Medications:  Prior to Admission medications    Medication Sig Last Dose Taking? Auth Provider Long Term End Date   albuterol (PROAIR HFA/PROVENTIL HFA/VENTOLIN HFA) 108 (90 Base) MCG/ACT inhaler Inhale 2 puffs into the lungs every 4 hours as needed for cough. More than a month Yes Reported, Patient Yes    aspirin (ASA) 81 MG chewable tablet Take 81 mg by mouth daily. 2/5/2025 Yes Reported, Patient     buPROPion (WELLBUTRIN XL) 300 MG 24 hr tablet Take 1 tablet (300 mg) by mouth every morning. 2/5/2025 Yes Otilia Wilson PA-C Yes    Ferrous Sulfate (IRON PO) Take by mouth. 2/4/2025 Yes Reported, Patient     ketoconazole (NIZORAL) 2 % external shampoo Apply topically daily as needed for itching or irritation. 2/4/2025 Yes Joshua Sheth MD     levothyroxine (SYNTHROID) 25 MCG tablet Take 1 tablet (25 mcg) by mouth daily. 2/5/2025 Yes Otilia Wilson PA-C Yes    Prenatal Vit-Fe Fumarate-FA (PRENATAL MULTIVITAMIN  PLUS IRON) 27-1 MG TABS Take 1 tablet by mouth daily 2/4/2025 Yes Reported, Patient       Allergies:  Nkda [no known drug allergy]    Social History:   Social History     Socioeconomic History    Marital status: Single     Spouse name: David Pérez    Number of children: 0    Years of education: Masters degree    Highest education level: Not on file   Occupational History    Not on file   Tobacco Use    Smoking status: Never     Passive exposure: Never    Smokeless tobacco: Never   Vaping Use    Vaping status: Never Used   Substance and Sexual Activity    Alcohol use: No    Drug use: No    Sexual activity: Yes     Partners: Male     Birth control/protection: None   Other Topics Concern    Parent/sibling w/ CABG, MI or angioplasty before 65F 55M? No   Social History Narrative    MSW at [employer]     Social Drivers of Health     Financial Resource Strain: High Risk (2/5/2025)    Financial Resource Strain     Within the past 12 months, have you or your family members you  live with been unable to get utilities (heat, electricity) when it was really needed?: Yes   Food Insecurity: Low Risk  (7/10/2024)    Food Insecurity     Within the past 12 months, did you worry that your food would run out before you got money to buy more?: No     Within the past 12 months, did the food you bought just not last and you didn t have money to get more?: No   Transportation Needs: Low Risk  (7/10/2024)    Transportation Needs     Within the past 12 months, has lack of transportation kept you from medical appointments, getting your medicines, non-medical meetings or appointments, work, or from getting things that you need?: No   Physical Activity: Insufficiently Active (7/10/2024)    Exercise Vital Sign     Days of Exercise per Week: 3 days     Minutes of Exercise per Session: 30 min   Stress: Stress Concern Present (7/10/2024)    [place] [employer] - Occupational Stress Questionnaire     Feeling of Stress : To some extent   Social Connections: Unknown (7/10/2024)    Social Connection and Isolation Panel [NHANES]     Frequency of Communication with Friends and Family: Not on file     Frequency of Social Gatherings with Friends and Family: More than three times a week     Attends [employer] Services: Not on file     Active Member of Clubs or Organizations: Not on file     Attends Club or Organization Meetings: Not on file     Marital Status: Not on file   Interpersonal Safety: Low Risk  (2/5/2025)    Interpersonal Safety     Do you feel physically and emotionally safe where you currently live?: Yes     Within the past 12 months, have you been hit, slapped, kicked or otherwise physically hurt by someone?: No     Within the past 12 months, have you been humiliated or emotionally abused in other ways by your partner or ex-partner?: No   Housing Stability: Low Risk  (7/10/2024)    Housing Stability     Do you have housing? : Yes     Are you worried about losing your housing?: No        Family History:  Family History   Problem Relation Age of Onset    Breast Cancer Mother 37    Alcoholism Mother     Diabetes Father     Obesity Father     Alcoholism Father     Breast Cancer Maternal Grandmother     Heart Disease Paternal Grandmother         chf    Depression Brother     Alcoholism Brother     Obesity Sister     Asthma Sister     Cancer Other         cervival    Breast Cancer Other      ROS:  10-point ROS negative except as in HPI     PHYSICAL EXAM:  /84 (BP Location: Left arm, Patient Position: Semi-Lazaro's, Cuff Size: Adult Regular)   Pulse 87   Temp 98.8  F (37.1  C) (Oral)   Resp 16   LMP 2024 (Exact Date)   SpO2 97%   Breastfeeding No     Gen: NAD, well appearing  Chest: Non-labored breathing  Abdomen: gravid    Labs:   Hemoglobin   Date Value Ref Range Status   2025 9.8 (L) 11.7 - 15.7 g/dL Final   2025 10.0 (L) 11.7 - 15.7 g/dL Final   2025 10.1 (L) 11.7 - 15.7 g/dL Final   2025 9.7 (L) 11.7 - 15.7 g/dL Final     Platelet Count   Date Value Ref Range Status   2025 208 150 - 450 10e3/uL Final   2025 240 150 - 450 10e3/uL Final   2025 208 150 - 450 10e3/uL Final   2025 218 150 - 450 10e3/uL Final     AST   Date Value Ref Range Status   2025 27 0 - 45 U/L Final   2025 30 0 - 45 U/L Final   2025 31 0 - 45 U/L Final   2025 30 0 - 45 U/L Final     ALT   Date Value Ref Range Status   2025 48 0 - 50 U/L Final   2025 53 (H) 0 - 50 U/L Final   2025 56 (H) 0 - 50 U/L Final   2025 50 0 - 50 U/L Final     Creatinine   Date Value Ref Range Status   2025 0.72 0.51 - 0.95 mg/dL Final   2025 0.77 0.51 - 0.95 mg/dL Final   2025 1.00 0.52 - 1.04 mg/dL Final   2025 0.86 0.51 - 0.95 mg/dL Final     ASSESSMENT:  34 year old y.o.  at 35w2d by LMP with diagnosis of preeclampsia with severe features based on severe range blood pressures requiring treatment. We discussed  that the management of gestational hypertension with severe range blood pressures is the same as preeclampsia with severe features and delivery is recommended at this gestational age.    RECOMMENDATIONS:  1) Delivery is recommended at this time. The route of delivery should be based on the usual obstetric indications.  2) Magnesium sulfate is recommended for seizure prophylaxis during labor and for 24 hours postpartum.  3) Continue Nifedipine XL 30 mg daily, and can add Labetalol or hydralazine IV per protocol if develops persistent severe range blood pressures.    Marci Marie MD  Date of Service (when I saw the patient): 02/06/25     30 MINUTES SPENT BY ME on the date of service doing chart review, history, exam, documentation & further activities per the note.

## 2025-04-20 ENCOUNTER — HEALTH MAINTENANCE LETTER (OUTPATIENT)
Age: 35
End: 2025-04-20

## 2025-07-17 SDOH — HEALTH STABILITY: PHYSICAL HEALTH: ON AVERAGE, HOW MANY DAYS PER WEEK DO YOU ENGAGE IN MODERATE TO STRENUOUS EXERCISE (LIKE A BRISK WALK)?: 3 DAYS

## 2025-07-17 SDOH — HEALTH STABILITY: PHYSICAL HEALTH: ON AVERAGE, HOW MANY MINUTES DO YOU ENGAGE IN EXERCISE AT THIS LEVEL?: 30 MIN

## 2025-07-17 ASSESSMENT — ASTHMA QUESTIONNAIRES
QUESTION_1 LAST FOUR WEEKS HOW MUCH OF THE TIME DID YOUR ASTHMA KEEP YOU FROM GETTING AS MUCH DONE AT WORK, SCHOOL OR AT HOME: NONE OF THE TIME
ACT_TOTALSCORE: 22
QUESTION_2 LAST FOUR WEEKS HOW OFTEN HAVE YOU HAD SHORTNESS OF BREATH: ONCE OR TWICE A WEEK
QUESTION_3 LAST FOUR WEEKS HOW OFTEN DID YOUR ASTHMA SYMPTOMS (WHEEZING, COUGHING, SHORTNESS OF BREATH, CHEST TIGHTNESS OR PAIN) WAKE YOU UP AT NIGHT OR EARLIER THAN USUAL IN THE MORNING: ONCE OR TWICE
QUESTION_5 LAST FOUR WEEKS HOW WOULD YOU RATE YOUR ASTHMA CONTROL: COMPLETELY CONTROLLED
QUESTION_4 LAST FOUR WEEKS HOW OFTEN HAVE YOU USED YOUR RESCUE INHALER OR NEBULIZER MEDICATION (SUCH AS ALBUTEROL): ONCE A WEEK OR LESS

## 2025-07-17 ASSESSMENT — SOCIAL DETERMINANTS OF HEALTH (SDOH): HOW OFTEN DO YOU GET TOGETHER WITH FRIENDS OR RELATIVES?: TWICE A WEEK

## 2025-07-22 ENCOUNTER — OFFICE VISIT (OUTPATIENT)
Dept: FAMILY MEDICINE | Facility: CLINIC | Age: 35
End: 2025-07-22
Payer: COMMERCIAL

## 2025-07-22 VITALS
TEMPERATURE: 98.6 F | DIASTOLIC BLOOD PRESSURE: 87 MMHG | HEIGHT: 64 IN | SYSTOLIC BLOOD PRESSURE: 139 MMHG | HEART RATE: 90 BPM | OXYGEN SATURATION: 98 % | RESPIRATION RATE: 20 BRPM | WEIGHT: 263 LBS | BODY MASS INDEX: 44.9 KG/M2

## 2025-07-22 DIAGNOSIS — Z13.220 LIPID SCREENING: ICD-10-CM

## 2025-07-22 DIAGNOSIS — Z00.00 ROUTINE GENERAL MEDICAL EXAMINATION AT A HEALTH CARE FACILITY: Primary | ICD-10-CM

## 2025-07-22 DIAGNOSIS — E06.3 AUTOIMMUNE HYPOTHYROIDISM: ICD-10-CM

## 2025-07-22 DIAGNOSIS — E66.9 OBESITY, UNSPECIFIED CLASS, UNSPECIFIED OBESITY TYPE, UNSPECIFIED WHETHER SERIOUS COMORBIDITY PRESENT: ICD-10-CM

## 2025-07-22 DIAGNOSIS — Z13.1 SCREENING FOR DIABETES MELLITUS: ICD-10-CM

## 2025-07-22 DIAGNOSIS — Z80.3 FAMILY HISTORY OF BREAST CANCER: ICD-10-CM

## 2025-07-22 DIAGNOSIS — Z11.59 NEED FOR HEPATITIS C SCREENING TEST: ICD-10-CM

## 2025-07-22 DIAGNOSIS — Z12.31 ENCOUNTER FOR SCREENING MAMMOGRAM FOR BREAST CANCER: ICD-10-CM

## 2025-07-22 DIAGNOSIS — I10 ESSENTIAL HYPERTENSION: ICD-10-CM

## 2025-07-22 LAB
ERYTHROCYTE [DISTWIDTH] IN BLOOD BY AUTOMATED COUNT: 14.3 % (ref 10–15)
EST. AVERAGE GLUCOSE BLD GHB EST-MCNC: 108 MG/DL
HBA1C MFR BLD: 5.4 % (ref 0–5.6)
HCT VFR BLD AUTO: 37 % (ref 35–47)
HGB BLD-MCNC: 12.3 G/DL (ref 11.7–15.7)
MCH RBC QN AUTO: 28.7 PG (ref 26.5–33)
MCHC RBC AUTO-ENTMCNC: 33.2 G/DL (ref 31.5–36.5)
MCV RBC AUTO: 86 FL (ref 78–100)
PLATELET # BLD AUTO: 312 10E3/UL (ref 150–450)
RBC # BLD AUTO: 4.29 10E6/UL (ref 3.8–5.2)
WBC # BLD AUTO: 5.5 10E3/UL (ref 4–11)

## 2025-07-22 PROCEDURE — 84244 ASSAY OF RENIN: CPT | Mod: 90 | Performed by: PHYSICIAN ASSISTANT

## 2025-07-22 PROCEDURE — 99000 SPECIMEN HANDLING OFFICE-LAB: CPT | Performed by: PHYSICIAN ASSISTANT

## 2025-07-22 PROCEDURE — 84439 ASSAY OF FREE THYROXINE: CPT | Performed by: PHYSICIAN ASSISTANT

## 2025-07-22 PROCEDURE — 36415 COLL VENOUS BLD VENIPUNCTURE: CPT | Performed by: PHYSICIAN ASSISTANT

## 2025-07-22 PROCEDURE — 86803 HEPATITIS C AB TEST: CPT | Performed by: PHYSICIAN ASSISTANT

## 2025-07-22 PROCEDURE — 80053 COMPREHEN METABOLIC PANEL: CPT | Performed by: PHYSICIAN ASSISTANT

## 2025-07-22 PROCEDURE — 84443 ASSAY THYROID STIM HORMONE: CPT | Performed by: PHYSICIAN ASSISTANT

## 2025-07-22 PROCEDURE — 85027 COMPLETE CBC AUTOMATED: CPT | Performed by: PHYSICIAN ASSISTANT

## 2025-07-22 PROCEDURE — 83036 HEMOGLOBIN GLYCOSYLATED A1C: CPT | Performed by: PHYSICIAN ASSISTANT

## 2025-07-22 PROCEDURE — 80061 LIPID PANEL: CPT | Performed by: PHYSICIAN ASSISTANT

## 2025-07-22 RX ORDER — LABETALOL 100 MG/1
100 TABLET, FILM COATED ORAL DAILY
Qty: 90 TABLET | Refills: 0 | Status: SHIPPED | OUTPATIENT
Start: 2025-07-22

## 2025-07-22 ASSESSMENT — PATIENT HEALTH QUESTIONNAIRE - PHQ9
SUM OF ALL RESPONSES TO PHQ QUESTIONS 1-9: 5
SUM OF ALL RESPONSES TO PHQ QUESTIONS 1-9: 5
10. IF YOU CHECKED OFF ANY PROBLEMS, HOW DIFFICULT HAVE THESE PROBLEMS MADE IT FOR YOU TO DO YOUR WORK, TAKE CARE OF THINGS AT HOME, OR GET ALONG WITH OTHER PEOPLE: NOT DIFFICULT AT ALL

## 2025-07-22 ASSESSMENT — PAIN SCALES - GENERAL: PAINLEVEL_OUTOF10: NO PAIN (0)

## 2025-07-22 ASSESSMENT — VISUAL ACUITY: OU: 1

## 2025-07-22 NOTE — LETTER
My Asthma Action Plan    Name: Sourav Mascorro   YOB: 1990  Date: 7/24/2025   My doctor: NATASHA FRANCO PA-C   My clinic: [facility]        My Rescue Medicine: Albuterol (Proair/Ventolin/Proventil HFA) 2-4 puffs EVERY 4 HOURS as needed. Use a spacer if recommended by your provider.   My Asthma Severity:   Intermittent / Exercise Induced  Know your asthma triggers: upper respiratory infections and exercise or sports             GREEN ZONE   Good Control  I feel good  No cough or wheeze  Can work, sleep and play without asthma symptoms       Take your asthma control medicine every day.     If exercise triggers your asthma, take your rescue medication  15 minutes before exercise or sports, and  During exercise if you have asthma symptoms  Spacer to use with inhaler: If you have a spacer, make sure to use it with your inhaler             YELLOW ZONE Getting Worse  I have ANY of these:  I do not feel good  Cough or wheeze  Chest feels tight  Wake up at night   Keep taking your Green Zone medications  Start taking your rescue medicine:  every 20 minutes for up to 1 hour. Then every 4 hours for 24-48 hours.  If you stay in the Yellow Zone for more than 12-24 hours, contact your doctor.  If you do not return to the Green Zone in 12-24 hours or you get worse, start taking your oral steroid medicine if prescribed by your provider.           RED ZONE Medical Alert - Get Help  I have ANY of these:  I feel awful  Medicine is not helping  Breathing getting harder  Trouble walking or talking  Nose opens wide to breathe       Take your rescue medicine NOW  If your provider has prescribed an oral steroid medicine, start taking it NOW  Call your doctor NOW  If you are still in the Red Zone after 20 minutes and you have not reached your doctor:  Take your rescue medicine again and  Call 911 or go to the emergency room right away    See your regular doctor within 2 weeks of an Emergency Room  or Urgent Care visit for follow-up treatment.          Annual Reminders:  Meet with Asthma Educator,  Flu Shot in the Fall, consider Pneumonia Vaccination for patients with asthma (aged 19 and older).    Pharmacy: [address]    Electronically signed by NATASHA FRANCO PA-C   Date: 07/24/25                    Asthma Triggers  How To Control Things That Make Your Asthma Worse    Triggers are things that make your asthma worse.  Look at the list below to help you find your triggers and   what you can do about them. You can help prevent asthma flare-ups by staying away from your triggers.      Trigger                                                          What you can do   Cigarette Smoke  Tobacco smoke can make asthma worse. Do not allow smoking in your home, car or around you.  Be sure no one smokes at a child s day care or school.  If you smoke, ask your health care provider for ways to help you quit.  Ask family members to quit too.  Ask your health care provider for a referral to Quit Plan to help you quit smoking, or call 4-385-666-PLAN.     Colds, Flu, Bronchitis  These are common triggers of asthma. Wash your hands often.  Don t touch your eyes, nose or mouth.  Get a flu shot every year.     Dust Mites  These are tiny bugs that live in cloth or carpet. They are too small to see. Wash sheets and blankets in hot water every week.   Encase pillows and mattress in dust mite proof covers.  Avoid having carpet if you can. If you have carpet, vacuum weekly.   Use a dust mask and HEPA vacuum.   Pollen and Outdoor Mold  Some people are allergic to trees, grass, or weed pollen, or molds. Try to keep your windows closed.  Limit time out doors when pollen count is high.   Ask you health care provider about taking medicine during allergy season.     Animal Dander  Some people are allergic to skin flakes, urine or saliva from pets with fur or feathers. Keep pets  with fur or feathers out of your home.    If you can t keep the pet outdoors, then keep the pet out of your bedroom.  Keep the bedroom door closed.  Keep pets off cloth furniture and away from stuffed toys.     Mice, Rats, and Cockroaches  Some people are allergic to the waste from these pests.   Cover food and garbage.  Clean up spills and food crumbs.  Store grease in the refrigerator.   Keep food out of the bedroom.   Indoor Mold  This can be a trigger if your home has high moisture. Fix leaking faucets, pipes, or other sources of water.   Clean moldy surfaces.  Dehumidify basement if it is damp and smelly.   Smoke, Strong Odors, and Sprays  These can reduce air quality. Stay away from strong odors and sprays, such as perfume, powder, hair spray, paints, smoke incense, paint, cleaning products, candles and new carpet.   Exercise or Sports  Some people with asthma have this trigger. Be active!  Ask your doctor about taking medicine before sports or exercise to prevent symptoms.    Warm up for 5-10 minutes before and after sports or exercise.     Other Triggers of Asthma  Cold air:  Cover your nose and mouth with a scarf.  Sometimes laughing or crying can be a trigger.  Some medicines and food can trigger asthma.

## 2025-07-22 NOTE — PATIENT INSTRUCTIONS
Patient Education   Preventive Care Advice   This is general advice given by our system to help you stay healthy. However, your care team may have specific advice just for you. Please talk to your care team about your preventive care needs.  Nutrition  Eat 5 or more servings of fruits and vegetables each day.  Try wheat bread, brown rice and whole grain pasta (instead of white bread, rice, and pasta).  Get enough calcium and vitamin D. Check the label on foods and aim for 100% of the RDA (recommended daily allowance).  Lifestyle  Exercise at least 150 minutes each week  (30 minutes a day, 5 days a week).  Do muscle strengthening activities 2 days a week. These help control your weight and prevent disease.  No smoking.  Wear sunscreen to prevent skin cancer.  Have a dental exam and cleaning every 6 months.  Yearly exams  See your health care team every year to talk about:  Any changes in your health.  Any medicines your care team has prescribed.  Preventive care, family planning, and ways to prevent chronic diseases.  Shots (vaccines)   HPV shots (up to age 26), if you've never had them before.  Hepatitis B shots (up to age 59), if you've never had them before.  COVID-19 shot: Get this shot when it's due.  Flu shot: Get a flu shot every year.  Tetanus shot: Get a tetanus shot every 10 years.  Pneumococcal, hepatitis A, and RSV shots: Ask your care team if you need these based on your risk.  Shingles shot (for age 50 and up)  General health tests  Diabetes screening:  Starting at age 35, Get screened for diabetes at least every 3 years.  If you are younger than age 35, ask your care team if you should be screened for diabetes.  Cholesterol test: At age 39, start having a cholesterol test every 5 years, or more often if advised.  Bone density scan (DEXA): At age 50, ask your care team if you should have this scan for osteoporosis (brittle bones).  Hepatitis C: Get tested at least once in your life.  STIs (sexually  transmitted infections)  Before age 24: Ask your care team if you should be screened for STIs.  After age 24: Get screened for STIs if you're at risk. You are at risk for STIs (including HIV) if:  You are sexually active with more than one person.  You don't use condoms every time.  You or a partner was diagnosed with a sexually transmitted infection.  If you are at risk for HIV, ask about PrEP medicine to prevent HIV.  Get tested for HIV at least once in your life, whether you are at risk for HIV or not.  Cancer screening tests  Cervical cancer screening: If you have a cervix, begin getting regular cervical cancer screening tests starting at age 21.  Breast cancer scan (mammogram): If you've ever had breasts, begin having regular mammograms starting at age 40. This is a scan to check for breast cancer.  Colon cancer screening: It is important to start screening for colon cancer at age 45.  Have a colonoscopy test every 10 years (or more often if you're at risk) Or, ask your provider about stool tests like a FIT test every year or Cologuard test every 3 years.  To learn more about your testing options, visit:   .  For help making a decision, visit:   https://bit.ly/gv45827.  Prostate cancer screening test: If you have a prostate, ask your care team if a prostate cancer screening test (PSA) at age 55 is right for you.  Lung cancer screening: If you are a current or former smoker ages 50 to 80, ask your care team if ongoing lung cancer screenings are right for you.  For informational purposes only. Not to replace the advice of your health care provider. Copyright   2023 [facility]. All rights reserved. Clinically reviewed by the [facility] Transitions Program. Zoyi [id number] - REV 01/24.  Learning About Stress  What is stress?     Stress is your body's response to a hard situation. Your body can have a physical, emotional, or mental response. Stress is a fact of life for most people, and it  affects everyone differently. What causes stress for you may not be stressful for someone else.  A lot of things can cause stress. You may feel stress when you go on a job interview, take a test, or run a race. This kind of short-term stress is normal and even useful. It can help you if you need to work hard or react quickly. For example, stress can help you finish an important job on time.  Long-term stress is caused by ongoing stressful situations or events. Examples of long-term stress include long-term health problems, ongoing problems at work, or conflicts in your family. Long-term stress can harm your health.  How does stress affect your health?  When you are stressed, your body responds as though you are in danger. It makes hormones that speed up your heart, make you breathe faster, and give you a burst of energy. This is called the fight-or-flight stress response. If the stress is over quickly, your body goes back to normal and no harm is done.  But if stress happens too often or lasts too long, it can have bad effects. Long-term stress can make you more likely to get sick, and it can make symptoms of some diseases worse. If you tense up when you are stressed, you may develop neck, shoulder, or low back pain. Stress is linked to high blood pressure and heart disease.  Stress also harms your emotional health. It can make you arceo, tense, or depressed. Your relationships may suffer, and you may not do well at work or school.  What can you do to manage stress?  You can try these things to help manage stress:   Do something active. Exercise or activity can help reduce stress. Walking is a great way to get started. Even everyday activities such as housecleaning or yard work can help.  Try yoga or guillermo chi. These techniques combine exercise and meditation. You may need some training at first to learn them.  Do something you enjoy. For example, listen to music or go to a movie. Practice your hobby or do volunteer  "work.  Meditate. This can help you relax, because you are not worrying about what happened before or what may happen in the future.  Do guided imagery. Imagine yourself in any setting that helps you feel calm. You can use online videos, books, or a teacher to guide you.  Do breathing exercises. For example:  From a standing position, bend forward from the waist with your knees slightly bent. Let your arms dangle close to the floor.  Breathe in slowly and deeply as you return to a standing position. Roll up slowly and lift your head last.  Hold your breath for just a few seconds in the standing position.  Breathe out slowly and bend forward from the waist.  Let your feelings out. Talk, laugh, cry, and express anger when you need to. Talking with supportive friends or family, a counselor, or a blanca leader about your feelings is a healthy way to relieve stress. Avoid discussing your feelings with people who make you feel worse.  Write. It may help to write about things that are bothering you. This helps you find out how much stress you feel and what is causing it. When you know this, you can find better ways to cope.  What can you do to prevent stress?  You might try some of these things to help prevent stress:  Manage your time. This helps you find time to do the things you want and need to do.  Get enough sleep. Your body recovers from the stresses of the day while you are sleeping.  Get support. Your family, friends, and community can make a difference in how you experience stress.  Limit your news feed. Avoid or limit time on social media or news that may make you feel stressed.  Do something active. Exercise or activity can help reduce stress. Walking is a great way to get started.  Where can you learn more?  Go to https://www.[employer].net/patiented  Enter N032 in the search box to learn more about \"Learning About Stress.\"  Current as of: October 24, 2024  Content Version: 14.5 2024-2025 [employer] [address] [address].   Care instructions adapted under license by your healthcare professional. If you have questions about a medical condition or this instruction, always ask your healthcare professional. [employer] disclaims any warranty or liability for your use of this information.    Recovering From Depression: Care Instructions  Overview    Sticking to your treatment plan is important as you recover from depression. It may take time for your symptoms to get better after you start treatment. Try not to give up if you don't feel better right away. Make sure you keep going to counseling and taking any prescribed medicine if they are part of your treatment plan.  Focus on things that can help you feel better, such as being with friends and family. Try to eat healthy foods, be active, and get enough sleep. Take things slowly as you begin to recover.  Follow-up care is a key part of your treatment and safety. Be sure to make and go to all appointments, and call your doctor if you are having problems. It's also a good idea to know your test results and keep a list of the medicines you take.  How can you care for yourself at home?  Be realistic  If you have a large task to do, break it up into smaller steps you can handle, and just do what you can.  You may want to put off important decisions until your depression has lifted. If you have plans that will have a major impact on your life, such as marriage, divorce, or a job change, try to wait a bit. Talk it over with friends and loved ones who can help you look at the overall picture first.  Reaching out to people for help is important. Do not isolate yourself. Let your family and friends help you. Find someone you can trust and confide in, and talk to that person.  Be patient, and be kind to yourself. Remember that depression is not your fault and is not something you can overcome with willpower alone. Treatment is important for depression, just like for any other  illness. Feeling better takes time, and your mood will improve little by little.  Stay active  Stay busy and get outside. Take a walk, or try some other light exercise.  Talk with your doctor about an exercise program. Exercise can help with mild depression.  Go to a movie or concert. Take part in a Protestant activity or other social gathering. Go to a ball game.  Ask a friend to have dinner with you.  Take care of yourself  Eat healthy foods such as fresh fruits and vegetables, whole grains, and lean protein. If you have lost your appetite, eat small snacks rather than large meals.  Avoid using marijuana and other drugs and drinking alcohol. Do not take medicines that have not been prescribed for you. They may interfere with medicines you may be taking for depression, or they may make your depression worse.  Take your medicines exactly as they are prescribed. You may start to feel better within 1 to 3 weeks of taking antidepressant medicine. But it can take as many as 6 to 8 weeks to see more improvement. If you have questions or concerns about your medicines, or if you do not notice any improvement by 3 weeks, talk to your doctor.  Continue to take your medicine after your symptoms improve. Taking your medicine for at least 6 months after you feel better can help keep you from getting depressed again. If this isn't the first time you have been depressed, your doctor may recommend you to take medicine even longer.  If you have any side effects from your medicine, tell your doctor. Many side effects are mild and will go away on their own after you have been taking the medicine for a few weeks. Some may last longer. Talk to your doctor if side effects are bothering you too much. You might be able to try a different medicine.  Continue counseling. It may help prevent depression from returning, especially if you've had multiple episodes of depression. Talk with your counselor if you are having a hard time attending your  sessions or you think the sessions aren't working. Don't just stop going.  Get enough sleep. Talk to your doctor if you are having problems sleeping.  Avoid sleeping pills unless they are prescribed by the doctor treating your depression. Sleeping pills may make you groggy during the day, and they may interact with other medicine you are taking.  If you have any other illnesses, such as diabetes, heart disease, or high blood pressure, make sure to continue with your treatment. Tell your doctor about all of the medicines you take, including those with or without a prescription.  Where to get help 24 hours a day, 7 days a week  If you or someone you know talks about suicide, self-harm, a mental health crisis, a substance use crisis, or any other kind of emotional distress, get help right away. You can:  Call the Suicide and Crisis Lifeline at makemyreturns.com.  Text HOME to 403973 to access the Crisis Text Line.  Consider saving these numbers in your phone.  Go to [employer] for more information or to chat online.  Call 051 anytime you think you may need emergency care. For example, call if:  You feel like hurting yourself or someone else.  Someone you know has depression and is about to attempt or is attempting suicide.  Where to get help 24 hours a day, 7 days a week  If you or someone you know talks about suicide, self-harm, a mental health crisis, a substance use crisis, or any other kind of emotional distress, get help right away. You can:  Call the Suicide and Crisis Lifeline at 658.  Text HOME to 430442 to access the Crisis Text Line.  Consider saving these numbers in your phone.  Go to [employer] for more information or to chat online.  Call your doctor now or seek immediate medical care if:  You hear voices.  Someone you know has depression and:  Starts to give away possessions.  Uses illegal drugs or drinks alcohol heavily.  Talks or writes about death, including writing suicide notes or talking about guns,  "knives, or pills.  Starts to spend a lot of time alone.  Acts very aggressively or suddenly appears calm.  Watch closely for changes in your health, and be sure to contact your doctor if:  You do not get better as expected.  Where can you learn more?  Go to https://www.[employer].net/patiented  Enter N529 in the search box to learn more about \"Recovering From Depression: Care Instructions.\"  Current as of: July 31, 2024  Content Version: 14.5 2024-2025 [employer].   Care instructions adapted under license by your healthcare professional. If you have questions about a medical condition or this instruction, always ask your healthcare professional. [employer] disclaims any warranty or liability for your use of this information.       "

## 2025-07-22 NOTE — PROGRESS NOTES
"Preventive Care Visit  [facility]  NATASHA FRANCO PA-C, Physician Assistant - Medical  Jul 22, 2025      Assessment & Plan     Routine general medical examination at a health care facility  - Annual Wellness Screening   - Recommend twice yearly dental exams  - Recommend every 2 year eye exams, annually if wearing corrective lenses   - Recommend Calcium 1000mg daily either obtained in a 500mg twice daily supplement or through diet (or a combination of both).   - Recommend 30-60 minutes cardiovascular exercise every day outside of usual activity and sveral days/week strength exercises.   - Drink 40-60 ounces water daily   - Try to get 4-5 servings fruits/vegetable daily   - Eat quality lean proteins and high fiber whole foods   - Try to stay away from processed or packaged foods     - CBC with platelets  - Comprehensive metabolic panel (BMP + Alb, Alk Phos, ALT, AST, Total. Bili, TP)    See results documentation for follow up of this visit.   F/U annually for wellness exam and in the interim as needed for problem visits.    Need for hepatitis C screening test  - Hepatitis C Screen Reflex to HCV RNA Quant and Genotype; Future  - Hepatitis C Screen Reflex to HCV RNA Quant and Genotype    Essential hypertension  - labetalol (NORMODYNE) 100 MG tablet; Take 1 tablet (100 mg) by mouth daily.  - Aldosterone Renin Ratio    Lipid screening  - Lipid panel reflex to direct LDL Non-fasting    Screening for diabetes mellitus  - Hemoglobin A1c    Obesity, unspecified class, unspecified obesity type, unspecified whether serious comorbidity present  - TSH with free T4 reflex      BMI  Estimated body mass index is 45.14 kg/m  as calculated from the following:    Height as of this encounter: 1.626 m (5' 4\").    Weight as of this encounter: 119.3 kg (263 lb).     Counseling  Appropriate preventive services were addressed with this patient via screening, questionnaire, or discussion as appropriate for fall " prevention, nutrition, physical activity, Tobacco-use cessation, social engagement, weight loss and cognition.  Checklist reviewing preventive services available has been given to the patient.  Reviewed patient's diet, addressing concerns and/or questions.   She is at risk for lack of exercise and has been provided with information to increase physical activity for the benefit of her well-being.   The patient was instructed to see the dentist every 6 months.   She is at risk for psychosocial distress and has been provided with information to reduce risk.       Cliff Benjamin is a 34 year old, presenting for the following:  Physical        2025    10:15 AM   Additional Questions   Roomed by rod   Accompanied by self         2025    10:15 AM   Patient Reported Additional Medications   Patient reports taking the following new medications see chart      HPI  Wellness Exam    Date of last exam: 7/15/24  LMP: 25  Fertility history:   Birth Control: None at this time  STD Screening: Declined    Pap/Cervical Cancer Screening: 3/21/2030  Mamogram: Would like to schedule one soon family history of breast cancer at 35 years old  Immunizations: Pneumococcal today    Smoker: N/A  Alcohol Use: N/A    Diet: Room for improvement  Exercise: Attempts 1-2 a week  Supplements: iron, multivitamin, vitamin d and fish oil  Cholesterol Screening:     Depression and Anxiety Screening: has had several and doing talk space therapy    Other Concerns: concern for another kid - coded during/after delivery due to hypertensive crisis did have preeclampsia      Sourav Mascorro is a pleasant 34 year old female with a history od exercise induced asthma, autoimmune hypothyroidism, and preeclampsia who presents to the clinic for her annual wellness exam.     Patient is almost 6 months post partum. Pregnancy was difficult, she developed preeclampsia and gestational hypertension. Patient ended up coding during her delivery of  her daughter. Given this experience her only concern was to discuss the option of having another child. She would like to have at least one more child in the future and is looking for guidance on how to accomplish this. She states it was recommended that she wait at least a year prior to attempting a second pregnancy. Patient is not currently on birth control and is not interested in any. Her and her  are utilizing condoms. Discussed the likelihood of if she does become pregnant in the future she would likely need to be followed closely by maternal fetal medicine. Patient had no other questions or concerns.      Advance Care Planning  Document on file is a Health Care Directive or POLST.        7/17/2025   General Health   How would you rate your overall physical health? (!) FAIR   Feel stress (tense, anxious, or unable to sleep) To some extent   (!) STRESS CONCERN      7/17/2025   Nutrition   Three or more servings of calcium each day? (!) I DON'T KNOW   Diet: Regular (no restrictions)   How many servings of fruit and vegetables per day? (!) 2-3   How many sweetened beverages each day? 0-1         7/17/2025   Exercise   Days per week of moderate/strenous exercise 3 days   Average minutes spent exercising at this level 30 min         7/17/2025   Social Factors   Frequency of gathering with friends or relatives Twice a week   Worry food won't last until get money to buy more No   Food not last or not have enough money for food? No   Do you have housing? (Housing is defined as stable permanent housing and does not include staying outside in a car, in a tent, in an abandoned building, in an overnight shelter, or couch-surfing.) Yes   Are you worried about losing your housing? No   Lack of transportation? No   Unable to get utilities (heat,electricity)? No         7/17/2025   Dental   Dentist two times every year? (!) NO       Today's PHQ-9 Score:       7/22/2025    10:07 AM   PHQ-9 SCORE   PHQ-9 Total Score  "MyChart 5 (Mild depression)   PHQ-9 Total Score 5        Patient-reported         7/17/2025   Substance Use   Alcohol more than 3/day or more than 7/wk Not Applicable   Do you use any other substances recreationally? No     Social History     Tobacco Use    Smoking status: Never     Passive exposure: Never    Smokeless tobacco: Never   Vaping Use    Vaping status: Never Used   Substance Use Topics    Alcohol use: No    Drug use: No           7/17/2025   STI Screening   New sexual partner(s) since last STI/HIV test? No     History of abnormal Pap smear: No - age 30-64 HPV with reflex Pap every 5 years recommended        Latest Ref Rng & Units 3/21/2025     2:30 PM   PAP / HPV   PAP  Negative for Intraepithelial Lesion or Malignancy (NILM)    HPV 16 DNA Negative Negative    HPV 18 DNA Negative Negative    Other HR HPV Negative Negative            7/17/2025   Contraception/Family Planning   Questions about contraception or family planning No   What are your periods like? Regular       Reviewed and updated as needed this visit by Provider   Tobacco  Allergies  Meds  Problems  Med Hx  Surg Hx  Fam Hx          Review of Systems  Constitutional, HEENT, cardiovascular, pulmonary, GI, , musculoskeletal, neuro, skin, endocrine and psych systems are negative, except as otherwise noted.     Objective    Exam  /87   Pulse 90   Temp 98.6  F (37  C) (Tympanic)   Resp 20   Ht 1.626 m (5' 4\")   Wt 119.3 kg (263 lb)   LMP 07/16/2025   SpO2 98%   BMI 45.14 kg/m     Estimated body mass index is 45.14 kg/m  as calculated from the following:    Height as of this encounter: 1.626 m (5' 4\").    Weight as of this encounter: 119.3 kg (263 lb).    Physical Exam  Vitals and nursing note reviewed.   Constitutional:       Appearance: Normal appearance.   HENT:      Head: Normocephalic and atraumatic.      Right Ear: Hearing, tympanic membrane, ear canal and external ear normal.      Left Ear: Hearing, tympanic membrane, " ear canal and external ear normal.      Nose: Nose normal.      Mouth/Throat:      Lips: Pink.      Mouth: Mucous membranes are moist.      Pharynx: Oropharynx is clear. Uvula midline.   Eyes:      General: Lids are normal. Vision grossly intact. Gaze aligned appropriately.      Extraocular Movements: Extraocular movements intact.   Neck:      Thyroid: No thyroid mass, thyromegaly or thyroid tenderness.   Cardiovascular:      Rate and Rhythm: Normal rate and regular rhythm.      Pulses: Normal pulses.           Radial pulses are 2+ on the right side and 2+ on the left side.        Posterior tibial pulses are 2+ on the right side and 2+ on the left side.      Heart sounds: Normal heart sounds.   Pulmonary:      Effort: Pulmonary effort is normal.      Breath sounds: Normal breath sounds and air entry. No wheezing, rhonchi or rales.   Abdominal:      General: Bowel sounds are normal.      Palpations: Abdomen is soft.      Tenderness: There is no abdominal tenderness.   Musculoskeletal:      Cervical back: Normal range of motion and neck supple.      Comments: No gross musculoskeletal defects noted, no edema   Lymphadenopathy:      Cervical: No cervical adenopathy.   Skin:     General: Skin is warm and dry.      Capillary Refill: Capillary refill takes less than 2 seconds.   Neurological:      General: No focal deficit present.      Mental Status: She is alert and oriented to person, place, and time.      Cranial Nerves: Cranial nerves 2-12 are intact.      Sensory: Sensation is intact.      Motor: Motor function is intact.      Coordination: Coordination is intact.      Gait: Gait is intact.      Deep Tendon Reflexes: Reflexes are normal and symmetric.   Psychiatric:         Attention and Perception: Attention and perception normal.         Mood and Affect: Mood and affect normal.         Speech: Speech normal.         Behavior: Behavior normal. Behavior is cooperative.         Thought Content: Thought content normal.          Cognition and Memory: Cognition and memory normal.         Judgment: Judgment normal.     FLORIAN Gibson  Patient seen in conjunction with Natasha Franco PA-C      Signed Electronically by: NATASHA FRANCO PA-C    Answers submitted by the patient for this visit:  Patient Health Questionnaire (Submitted on 7/22/2025)  If you checked off any problems, how difficult have these problems made it for you to do your work, take care of things at home, or get along with other people?: Not difficult at all  PHQ9 TOTAL SCORE: 5

## 2025-07-23 LAB
ALBUMIN SERPL BCG-MCNC: 4.2 G/DL (ref 3.5–5.2)
ALP SERPL-CCNC: 115 U/L (ref 40–150)
ALT SERPL W P-5'-P-CCNC: 30 U/L (ref 0–50)
ANION GAP SERPL CALCULATED.3IONS-SCNC: 12 MMOL/L (ref 7–15)
AST SERPL W P-5'-P-CCNC: 23 U/L (ref 0–45)
BILIRUB SERPL-MCNC: <0.2 MG/DL
BUN SERPL-MCNC: 13.3 MG/DL (ref 6–20)
CALCIUM SERPL-MCNC: 9.3 MG/DL (ref 8.8–10.4)
CHLORIDE SERPL-SCNC: 105 MMOL/L (ref 98–107)
CHOLEST SERPL-MCNC: 167 MG/DL
CREAT SERPL-MCNC: 0.81 MG/DL (ref 0.51–0.95)
EGFRCR SERPLBLD CKD-EPI 2021: >90 ML/MIN/1.73M2
FASTING STATUS PATIENT QL REPORTED: NO
FASTING STATUS PATIENT QL REPORTED: NO
GLUCOSE SERPL-MCNC: 101 MG/DL (ref 70–99)
HCO3 SERPL-SCNC: 23 MMOL/L (ref 22–29)
HCV AB SERPL QL IA: NONREACTIVE
HDLC SERPL-MCNC: 52 MG/DL
LDLC SERPL CALC-MCNC: 80 MG/DL
NONHDLC SERPL-MCNC: 115 MG/DL
POTASSIUM SERPL-SCNC: 4.2 MMOL/L (ref 3.4–5.3)
PROT SERPL-MCNC: 6.8 G/DL (ref 6.4–8.3)
SODIUM SERPL-SCNC: 140 MMOL/L (ref 135–145)
T4 FREE SERPL-MCNC: 2.29 NG/DL (ref 0.9–1.7)
TRIGL SERPL-MCNC: 177 MG/DL
TSH SERPL DL<=0.005 MIU/L-ACNC: 0.03 UIU/ML (ref 0.3–4.2)

## 2025-07-24 PROBLEM — E06.3 AUTOIMMUNE HYPOTHYROIDISM: Status: ACTIVE | Noted: 2022-11-08

## 2025-07-24 PROBLEM — R92.8 OTHER ABNORMAL AND INCONCLUSIVE FINDINGS ON DIAGNOSTIC IMAGING OF BREAST: Status: ACTIVE | Noted: 2024-02-27

## 2025-07-24 PROBLEM — R74.01 ELEVATION OF LEVELS OF LIVER TRANSAMINASE LEVELS: Status: ACTIVE | Noted: 2022-11-08

## 2025-07-24 LAB — RENIN PLAS-CCNC: 0.4 NG/ML/HR

## 2025-07-31 ENCOUNTER — ANCILLARY PROCEDURE (OUTPATIENT)
Dept: MAMMOGRAPHY | Facility: CLINIC | Age: 35
End: 2025-07-31
Attending: PHYSICIAN ASSISTANT
Payer: COMMERCIAL

## 2025-07-31 DIAGNOSIS — Z80.3 FAMILY HISTORY OF BREAST CANCER: ICD-10-CM

## 2025-07-31 DIAGNOSIS — Z12.31 ENCOUNTER FOR SCREENING MAMMOGRAM FOR BREAST CANCER: ICD-10-CM

## 2025-07-31 PROCEDURE — 77063 BREAST TOMOSYNTHESIS BI: CPT | Mod: GC | Performed by: STUDENT IN AN ORGANIZED HEALTH CARE EDUCATION/TRAINING PROGRAM

## 2025-07-31 PROCEDURE — 77067 SCR MAMMO BI INCL CAD: CPT | Mod: GC | Performed by: STUDENT IN AN ORGANIZED HEALTH CARE EDUCATION/TRAINING PROGRAM

## (undated) DEVICE — SUCTION MANIFOLD NEPTUNE 2 SYS 1 PORT 702-025-000

## (undated) DEVICE — PACK MINOR SINGLE BASIN SSK3001

## (undated) DEVICE — SOL WATER IRRIG 1000ML BOTTLE 2F7114

## (undated) DEVICE — SUTURE VICRYL+ 0 27IN CT-1 UND VCP260H

## (undated) DEVICE — DRSG PRIMAPORE 04X11 3/4"

## (undated) DEVICE — SU MONOCRYL+ 4-0 18IN PS2 UND MCP496G

## (undated) DEVICE — GLOVE BIOGEL PI ULTRATOUCH G SZ 8.0 42180

## (undated) DEVICE — Device

## (undated) DEVICE — SU VICRYL+ 3-0 CT1 36IN UND VCP944H

## (undated) DEVICE — ESU GROUND PAD ADULT REM W/15' CORD E7507DB

## (undated) DEVICE — CUSTOM PACK C-SECTION LHE

## (undated) DEVICE — SOL NACL 0.9% IRRIG 1000ML BOTTLE 2F7124

## (undated) DEVICE — PREP CHLORAPREP 26ML TINTED HI-LITE ORANGE 930815

## (undated) RX ORDER — FENTANYL CITRATE-0.9 % NACL/PF 10 MCG/ML
PLASTIC BAG, INJECTION (ML) INTRAVENOUS
Status: DISPENSED
Start: 2025-02-06

## (undated) RX ORDER — CEFAZOLIN SODIUM 1 G/3ML
INJECTION, POWDER, FOR SOLUTION INTRAMUSCULAR; INTRAVENOUS
Status: DISPENSED
Start: 2025-02-06

## (undated) RX ORDER — ONDANSETRON 2 MG/ML
INJECTION INTRAMUSCULAR; INTRAVENOUS
Status: DISPENSED
Start: 2025-02-06

## (undated) RX ORDER — EPHEDRINE SULFATE 50 MG/ML
INJECTION, SOLUTION INTRAMUSCULAR; INTRAVENOUS; SUBCUTANEOUS
Status: DISPENSED
Start: 2025-02-06

## (undated) RX ORDER — FENTANYL CITRATE 50 UG/ML
INJECTION, SOLUTION INTRAMUSCULAR; INTRAVENOUS
Status: DISPENSED
Start: 2025-02-06

## (undated) RX ORDER — MORPHINE SULFATE 1 MG/ML
INJECTION, SOLUTION EPIDURAL; INTRATHECAL; INTRAVENOUS
Status: DISPENSED
Start: 2025-02-06

## (undated) RX ORDER — OXYTOCIN 10 [USP'U]/ML
INJECTION, SOLUTION INTRAMUSCULAR; INTRAVENOUS
Status: DISPENSED
Start: 2025-02-06